# Patient Record
Sex: FEMALE | Race: WHITE | NOT HISPANIC OR LATINO | Employment: OTHER | URBAN - METROPOLITAN AREA
[De-identification: names, ages, dates, MRNs, and addresses within clinical notes are randomized per-mention and may not be internally consistent; named-entity substitution may affect disease eponyms.]

---

## 2018-03-23 ENCOUNTER — HOSPITAL ENCOUNTER (INPATIENT)
Facility: CLINIC | Age: 53
LOS: 4 days | Discharge: HOME | DRG: 897 | End: 2018-03-27
Attending: EMERGENCY MEDICINE | Admitting: PSYCHIATRY & NEUROLOGY
Payer: COMMERCIAL

## 2018-03-23 DIAGNOSIS — F12.10 CANNABIS ABUSE: ICD-10-CM

## 2018-03-23 DIAGNOSIS — F10.20 UNCOMPLICATED ALCOHOL DEPENDENCE (H): ICD-10-CM

## 2018-03-23 LAB
ALCOHOL BREATH TEST: 0.03 (ref 0–0.01)
AMPHETAMINES UR QL SCN: NEGATIVE
BARBITURATES UR QL: NEGATIVE
BENZODIAZ UR QL: NEGATIVE
CANNABINOIDS UR QL SCN: POSITIVE
COCAINE UR QL: NEGATIVE
ETHANOL UR QL SCN: NEGATIVE
OPIATES UR QL SCN: NEGATIVE

## 2018-03-23 PROCEDURE — 80320 DRUG SCREEN QUANTALCOHOLS: CPT | Performed by: EMERGENCY MEDICINE

## 2018-03-23 PROCEDURE — 25000132 ZZH RX MED GY IP 250 OP 250 PS 637: Performed by: PSYCHIATRY & NEUROLOGY

## 2018-03-23 PROCEDURE — 82075 ASSAY OF BREATH ETHANOL: CPT | Performed by: EMERGENCY MEDICINE

## 2018-03-23 PROCEDURE — 99284 EMERGENCY DEPT VISIT MOD MDM: CPT | Mod: Z6 | Performed by: EMERGENCY MEDICINE

## 2018-03-23 PROCEDURE — 99207 ZZC DOWN CODE DUE TO SUBSEQUENT EXAM: CPT | Performed by: PSYCHIATRY & NEUROLOGY

## 2018-03-23 PROCEDURE — HZ2ZZZZ DETOXIFICATION SERVICES FOR SUBSTANCE ABUSE TREATMENT: ICD-10-PCS | Performed by: PSYCHIATRY & NEUROLOGY

## 2018-03-23 PROCEDURE — 99221 1ST HOSP IP/OBS SF/LOW 40: CPT | Mod: AI | Performed by: PSYCHIATRY & NEUROLOGY

## 2018-03-23 PROCEDURE — 80307 DRUG TEST PRSMV CHEM ANLYZR: CPT | Performed by: EMERGENCY MEDICINE

## 2018-03-23 PROCEDURE — 12800012 ZZH R&B CD MH INTERMEDIATE ADULT

## 2018-03-23 PROCEDURE — 99285 EMERGENCY DEPT VISIT HI MDM: CPT | Performed by: EMERGENCY MEDICINE

## 2018-03-23 RX ORDER — MULTIPLE VITAMINS W/ MINERALS TAB 9MG-400MCG
1 TAB ORAL DAILY
Status: DISCONTINUED | OUTPATIENT
Start: 2018-03-23 | End: 2018-03-27 | Stop reason: HOSPADM

## 2018-03-23 RX ORDER — ATENOLOL 50 MG/1
50 TABLET ORAL DAILY PRN
Status: DISCONTINUED | OUTPATIENT
Start: 2018-03-23 | End: 2018-03-27 | Stop reason: HOSPADM

## 2018-03-23 RX ORDER — FOLIC ACID 1 MG/1
1 TABLET ORAL DAILY
Status: DISCONTINUED | OUTPATIENT
Start: 2018-03-23 | End: 2018-03-27 | Stop reason: HOSPADM

## 2018-03-23 RX ORDER — HYDROXYZINE HYDROCHLORIDE 25 MG/1
25 TABLET, FILM COATED ORAL EVERY 4 HOURS PRN
Status: DISCONTINUED | OUTPATIENT
Start: 2018-03-23 | End: 2018-03-27 | Stop reason: HOSPADM

## 2018-03-23 RX ORDER — LANOLIN ALCOHOL/MO/W.PET/CERES
100 CREAM (GRAM) TOPICAL DAILY
Status: COMPLETED | OUTPATIENT
Start: 2018-03-23 | End: 2018-03-25

## 2018-03-23 RX ORDER — DIAZEPAM 5 MG
5-20 TABLET ORAL EVERY 30 MIN PRN
Status: DISCONTINUED | OUTPATIENT
Start: 2018-03-23 | End: 2018-03-27 | Stop reason: HOSPADM

## 2018-03-23 RX ORDER — PAROXETINE 20 MG/1
20 TABLET, FILM COATED ORAL DAILY
Status: DISCONTINUED | OUTPATIENT
Start: 2018-03-23 | End: 2018-03-26

## 2018-03-23 RX ADMIN — FOLIC ACID 1 MG: 1 TABLET ORAL at 14:51

## 2018-03-23 RX ADMIN — NICOTINE POLACRILEX 8 MG: 4 GUM, CHEWING ORAL at 20:13

## 2018-03-23 RX ADMIN — Medication 100 MG: at 14:51

## 2018-03-23 RX ADMIN — PAROXETINE 20 MG: 20 TABLET, FILM COATED ORAL at 14:51

## 2018-03-23 RX ADMIN — NICOTINE POLACRILEX 4 MG: 4 GUM, CHEWING ORAL at 16:18

## 2018-03-23 RX ADMIN — HYDROXYZINE HYDROCHLORIDE 25 MG: 25 TABLET ORAL at 20:13

## 2018-03-23 RX ADMIN — MULTIPLE VITAMINS W/ MINERALS TAB 1 TABLET: TAB at 14:51

## 2018-03-23 RX ADMIN — DIAZEPAM 10 MG: 5 TABLET ORAL at 20:13

## 2018-03-23 RX ADMIN — DIAZEPAM 10 MG: 5 TABLET ORAL at 16:17

## 2018-03-23 ASSESSMENT — ENCOUNTER SYMPTOMS
POLYDIPSIA: 0
ADENOPATHY: 0
FEVER: 0
NECK PAIN: 0
VOMITING: 0
BACK PAIN: 0
COLOR CHANGE: 0
LIGHT-HEADEDNESS: 0
NECK STIFFNESS: 0
ABDOMINAL PAIN: 0
NAUSEA: 0
SHORTNESS OF BREATH: 0
CHILLS: 0
DIFFICULTY URINATING: 0

## 2018-03-23 ASSESSMENT — ACTIVITIES OF DAILY LIVING (ADL)
GROOMING: INDEPENDENT
GROOMING: INDEPENDENT
ORAL_HYGIENE: INDEPENDENT
ORAL_HYGIENE: INDEPENDENT
DRESS: INDEPENDENT
LAUNDRY: WITH SUPERVISION

## 2018-03-23 NOTE — H&P
Admitted:     03/23/2018      INITIAL ADMISSION       IDENTIFYING INFORMATION:  The patient is a 52-year-old  female.  She is on disability.  She is single, does not have any children.  She sees Dr. MENG      CHIEF COMPLAINT:  Alcohol.      HISTORY OF PRESENT ILLNESS:  The patient came here wanting detox from alcohol.  She has been drinking for almost a decade.  She is not able to elaborate about what prompted her to get help.  Alcohol is her drug of choice, started in high school.  By 20s, it was a problem.  Last treatment was over a decade ago.  She has complicated withdrawal, including history of DTs, and that is what prompted her to get help.  She has been drinking every day.  She is not able to tell me how much she has been drinking.  She says she is drinking up to a liter.  She has tolerance, withdrawal, history of DTs, progressive use with loss of control, tried to quit unsuccessfully, used despite negative consequences, money, relationships.  She uses marijuana daily.  She smokes 1 pack a day.  Denies using any other drugs.  She does have depression.  When she gets depressed, energy and motivation suffer.  She feels hopeless, helpless, worthless, poor energy, poor concentration, poor motivation, not able to enjoy things.  She does not have any suicidal or homicidal ideation, denies auditory or visual hallucinations.      PAST PSYCHIATRIC HISTORY:  Psychiatrically hospitalized 2 or 3 times for depression, last time was over a year ago.  She was also diagnosed with social phobia and she is an anxious person.  She was in 7 chemical dependency treatments.  No stay of commitments or court ordered.       PAST MEDICAL HISTORY:  The patient's 10-point review of systems was reviewed and is negative.  The patient's vitals are as below.        VITAL SIGNS:  Temperature of 98, heart rate of 90, respiratory rate of 16, blood pressure of 144/88.      FAMILY HISTORY:  Depression in aunts, uncles.  Great-uncle  committed suicide.  Cousin attempted suicide.      SOCIAL HISTORY:  Grew up in [place], third of 5 children.  Grew up with both parents.  She is presently unemployed.      MENTAL STATUS EXAMINATION:  The patient is a 52-year-old  female who is alert and oriented.  She is cooperative.  Mood is tired.  Affect is congruent.  Speech is spontaneous, normal rate.  Does not have any suicidal or homicidal ideation, denies any auditory or visual hallucinations.  Alert and oriented x3.  Recent and remote memory, language, fund of knowledge are all adequate.  No loose associations.      DIAGNOSES:  Axis I:     1.  Alcohol use disorder, severe.   2.  Major depressive disorder, recurrent, without psychotic features.      PLAN:  The patient will be detoxed off alcohol using MSSA protocol on Valium.  The patient will be restarted back on her medications once they are known.  The patient at this time is ambivalent what she wants to do in terms of treatment.         TAMAR SINHA MD             D: 2018   T: 2018   MT: TIM      Name:     DEVAUGHN ALLAN   MRN:      [id number]        Account:      [id number]   :      1965        Admitted:     2018                   Document: [id number]

## 2018-03-23 NOTE — PROGRESS NOTES
Pt's 52 year old female, came into ED after etOH use worsened. For past month she has been drinking about 1 liter vodka daily, last drink at 0800 today. She has a history of anxiety and depression, but stopped taking her medications about a month ago because she was afraid of how her increased drinking would affect the medication. She decided she wanted to become sober at the urging of a good friend and because she is worried about health and how drinking has taken away things she used to enjoy.  She is currently on disability for her depression.      She was unsure of how many previous inpatient programs she has attended, last one was possibly ten years ago at [facility] in [place]. She states she wants to restart her medication and start Antabuse. In the past she has found having a CD  and sponsor to be helpful. Her main support is a friend and her parents.     She has a history of passive SI, denies currently, contracts for safety.     She denies seizure history, but has a history of DTs.      Spoke with pt's OP pharmacy, pt's medications were:   Wellbutrin XL 450mg daily   Paroxetine 40mg Once daily   Abilify 15mg once a day   Propanolol  10-20mg TID prn    Seroquel 25-50 mg TID PRN

## 2018-03-23 NOTE — ED NOTES
LAST DRINK: Friday, 3/318 @ about 915 am  Daily Intake: 750 ml VODKA     No history of seizures    Patient is a smoker - 1 pack daily  And prefers a patch

## 2018-03-23 NOTE — IP AVS SNAPSHOT
MRN:[id number]                      After Visit Summary   3/23/2018    Steph Drew    MRN: [id number]           Thank you!     Thank you for choosing [facility] for your care. Our goal is always to provide you with excellent care.        Patient Information     Date Of Birth          1965        Designated Caregiver       Most Recent Value    Caregiver    Will someone help with your care after discharge? no      About your hospital stay     You were admitted on:  March 23, 2018 You last received care in the:  [facility]    You were discharged on:  March 27, 2018       Who to Call     For medical emergencies, please call 158.  For non-urgent questions about your medical care, please call your primary care provider or clinic, [phone]          Attending Provider     Provider Specialty    Steve Garcia MD Emergency Medicine    Tiff Mccauley MD Psychiatry       Primary Care Provider Office Phone # Fax #    Ariella Juarez [facility] [phone]      Follow-up Appointments     Follow Up ([facility])       [facility], within 7-14 days regarding new diagnosis and to follow up on results.  The following labs/tests are recommended: CBC, CMP, thyroid function tests.      Appointments on [facility] and/or [facility] (with [facility] or [facility] provider or service). Call [phone] if you haven't heard regarding these appointments within 7 days of discharge.                  Further instructions from your care team       Behavioral Discharge Planning and Instructions  THANK YOU FOR CHOOSING THE [address]    Summary: You were admitted to [address] [place] [address] on 3/23/18for detoxification from alcohol. You are being discharged today 3-.   You completed a chemical health assessment and you have been referred to OP treatment at [facility].      [facility] [place] [address]  Intake Department: [phone]    Recommendation: The treatment team recommends you enter an [facility]D outpatient program.      Main Diagnoses: DIAGNOSES per Dr. Mccauley psychiatrist:    Axis I:     1.  Alcohol use disorder, severe.   2.  Major depressive disorder, recurrent, without psychotic features.       Major Treatments, Procedures and Findings: Your withdrawal was treated with valium.  You were followed by Psychiatry and Medical.  You were given a copy of your lab results which were reviewed with you. Please bring your lab results with you to your primary follow up appointment. Make your recovery a priority, Steph.    Ultrasound and additional labs were ordered  You are to follow up with your primary MD for continued medical management     Symptoms to Report: If  you experience feeling more anxiety, confusion, losing more sleep, mood declining or thoughts of suicide, notify your treatment team or notify your primary physician. IF ANY OF THE SYMPTOMS YOU ARE EXPERIENCING ARE A MEDICAL EMERGENCY CALL 911 IMMEDIATELY.    Lifestyle Adjustment:   Health Action Plan:  1.Create a daily schedule  2. Eat Healthy  3. Plan Enjoyable Sober Activities  4. Use Problem Solving Skills and Deal with Issues as they Arise.   5. Be Physically Active  6. Take your medications as prescribed  7. Get enough restful sleep  8. Practice Relaxation  9. Spend time with Supportive People  10. No use of alcohol, illegal drugs or addictive medications other than what is currently prescribed.   11.AA, NA Sponsor are excellent resources for support    Keeping hands clean is one of the most important steps we can take to avoid getting sick and spreading germs to others.  Please wash your hands frequently.     Medical Provider Follow Up:   [facility], within 7-14 days regarding new diagnosis and to follow up on results.  The following labs/tests are recommended: CBC,  CMP, thyroid function tests. You report an appointment on Friday March 30th at 10:40 with Shahbaz Cresponavi .    Psychiatry Follow Up:  On Monday April 23rd at 11:00 am with Dr. Casarez at [facility].    Support Group:  AA/NA and Sponsor contact/support    Resources:  Crisis Intervention: 214.800.3455 or 013-078-0001 (TTY: 370.970.2152).  Call anytime for help.  Suicide Awareness Voices of Education (SAVE) (www.save.org): 419-190-UCYS (1245)  National Suicide Prevention Line (www.mentalhealthmn.org): 137-611-UAND (5128)  National [place] on Mental Illness (www.mn.alexander.org): 245.996.6604 or 130-336-9274.  Alcoholics Anonymous (www.alcoholics-anonymous.org): Or local information can be found 24 hours a day at:   AA Intergroup service office in [place] (http://www.aastpaul.org/) 495.791.1530  AA Intergroup service office in [facility]: 560.209.2294. (http://www.a[place].org/)  Narcotics Anonymous (www.nam[place].org)1-546.909.1111   [facility] Behavioral Intake 950-112-0963     Www.smartrecovery.org  SMART Recovery's 4-Point Program  helps people recover from all types of addictive behaviors, including: alcoholism, drug abuse, substance abuse, drug addiction, alcohol abuse, gambling addiction, cocaine addiction, and addiction to other substances and activities.  SMART Recovery (Self-Management And Recovery Training) is not a 12-step group, like Alcoholics Anonymous (AA) or Narcotics Anonymous (NA).  Teaches self-empowerment and self-reliance.  * Encourages individuals to recover and live satisfying lives.  * Teaches tools and techniques for self-directed change.  * Meetings are educational and include open discussions.  * Advocates the appropriate use of prescribed medications and psychological treatments.  * Evolves as scientific knowledge of addiction recovery evolves.  Many of the modules are available online.    [employer] is a suicide prevention resource for  "residents in [place].  We can help you with relationship issues, general mental health, and suicide.  We are free, confidential, and here for you 24/7/365.  1. Text \"Life\" to 19518  2. Wait for a trained crisis counselor to respond to your text  3. Respond and start the conversation about what you are concerned about   If you believe a person s life is in imminent danger, call 911.        [facility] ([facility])  [facility] connects people seeking recovery to resources that help foster and sustain long-term recovery.  Whether you are seeking resources for treatment, transportation, housing, job training, education, health care or other pathways to recovery, [facility] is a great place to start.  [phone]. Www.[facility].org    General Medication Instructions:   See your medication sheet(s) for instructions. Take all medicines as directed.  Make no changes unless your doctor directs them. Go to all your doctor visits. Be sure to have all your required lab tests. This way, your medicines can be refilled in timely fashion. Do not use any drugs not prescribed by your provider. AA/NA and Sponsors are excellent resources for support. Avoid alcohol.    Please Note: If you have any questions at anytime after you are discharged please call the [facility] [place] detox unit 3AW unit at [phone].  [facility], Behavioral Intake [phone]  Please take this discharge folder with you to all your follow up appointments, it contains your lab results, diagnosis, medication list and discharge recommendations.    THANK YOU FOR CHOOSING THE [facility]                               Pending Results     Date and Time Order Name Status Description    3/26/2018 1343 Hepatitis B Surface Antibody In process     3/26/2018 1343 Hepatitis B surface antigen In process     3/26/2018 1343 Hepatitis C antibody In process             Statement of Approval     Ordered " "         18 1100  I have reviewed and agree with all the recommendations and orders detailed in this document.  EFFECTIVE NOW     Approved and electronically signed by:  Tiff Mccauley MD             Admission Information     Date & Time Provider Department Dept. Phone    3/23/2018 Tiff Mccauley MD [facility] [phone]      Your Vitals Were     Blood Pressure Pulse Temperature Respirations Weight Pulse Oximetry    152/104 79 99  F (37.2  C) (Oral) 16 94 kg (207 lb 4 oz) 98%      MyChart Information     [employer] lets you send messages to your doctor, view your test results, renew your prescriptions, schedule appointments and more. To sign up, go to [email or web address] . Click on \"Log in\" on the left side of the screen, which will take you to the Welcome page. Then click on \"Sign up Now\" on the right side of the page.     You will be asked to enter the access code listed below, as well as some personal information. Please follow the directions to create your username and password.     Your access code is: [email or web address]  Expires: 2018 11:02 AM     Your access code will  in 90 days. If you need help or a new code, please call your [place] clinic or [phone].        Care EveryWhere ID     This is your Care EveryWhere ID. This could be used by other organizations to access your [place] medical records  [id number]        Equal Access to Services     PALAK ROMERO AH: Hadii raghav amayao Somichelle, waaxda luqadaha, qaybta kaalmada pardeepegyada, courtney barone . So [facility] [phone].    ATENCIÓN: Si habla español, tiene a borrero disposición servicios gratuitos de asistencia lingüística. Llame al [phone].    We comply with applicable federal civil rights laws and [place] laws. We do not discriminate on the basis of race, color, national origin, age, disability, sex, sexual orientation, or gender identity.               Review of your " medicines      START taking        Dose / Directions    multivitamin, therapeutic with minerals Tabs tablet   Used for:  Uncomplicated alcohol dependence (H)        Dose:  1 tablet   Start taking on:  3/28/2018   Take 1 tablet by mouth daily   Quantity:  30 each   Refills:  0       nicotine polacrilex 4 MG gum   Commonly known as:  NICORETTE   Used for:  Uncomplicated alcohol dependence (H)        Dose:  4-8 mg   Place 1-2 each (4-8 mg) inside cheek every hour as needed for other (nicotine withdrawal symptoms)   Quantity:  270 tablet   Refills:  0       PARoxetine 30 MG tablet   Commonly known as:  PAXIL   Used for:  Uncomplicated alcohol dependence (H)        Dose:  30 mg   Start taking on:  3/28/2018   Take 1 tablet (30 mg) by mouth daily   Quantity:  30 tablet   Refills:  0         STOP taking     QUEtiapine 25 MG tablet   Commonly known as:  SEROquel           WELLBUTRIN  MG 24 hr tablet   Generic drug:  buPROPion                Where to get your medicines      These medications were sent to [facility] [address]  [address] [address]     Phone:  [phone]     multivitamin, therapeutic with minerals Tabs tablet    nicotine polacrilex 4 MG gum    PARoxetine 30 MG tablet                Protect others around you: Learn how to safely use, store and throw away your medicines at www.disposemymeds.org.             Medication List: This is a list of all your medications and when to take them. Check marks below indicate your daily home schedule. Keep this list as a reference.      Medications           Morning Afternoon Evening Bedtime As Needed    multivitamin, therapeutic with minerals Tabs tablet   Take 1 tablet by mouth daily   Start taking on:  3/28/2018   Last time this was given:  1 tablet on 3/27/2018  8:58 AM                                   nicotine polacrilex 4 MG gum   Commonly known as:  NICORETTE   Place 1-2 each (4-8 mg) inside  cheek every hour as needed for other (nicotine withdrawal symptoms)   Last time this was given:  8 mg on 3/27/2018  9:30 AM                                   PARoxetine 30 MG tablet   Commonly known as:  PAXIL   Take 1 tablet (30 mg) by mouth daily   Start taking on:  3/28/2018   Last time this was given:  30 mg on 3/27/2018  8:58 AM                                             More Information        Depression  Depression is one of the most common mental health problems today. It is not just a state of unhappiness or sadness. It is a true disease. The cause seems to be related to a decrease in chemicals that transmit signals in the brain. Having a family history of depression, alcoholism, or suicide increases the risk. Chronic illness, chronic pain, migraine headaches and high emotional stress also increase the risk.  Depression is something we tend to recognize in others, but may have a hard time seeing in ourselves. It can show in many physical and emotional ways:    Loss of appetite    Over-eating    Not being able to sleep    Sleeping too much    Tiredness not related to physical exertion    Restlessness or irritability    Slowness of movement or speech    Feeling depressed or withdrawn    Loss of interest in things you once enjoyed    Trouble concentrating, poor memory, trouble making decisions    Thoughts of harming or killing oneself, or thoughts that life is not worth living    Low self-esteem  The treatment for depression may include both medicine and psychotherapy. Antidepressants can reduce suffering and can improve the ability to function during the depressed period. Therapy can offer emotional support and help you understand emotional factors that may be causing the depression.  Home care    On-going care and support helps people manage this disease.  Find a healthcare provider and therapist who meet your needs. Seek help when you feel like you may be getting ill.    Be kind to yourself. Make it a  point to do things that you enjoy (gardening, walking in nature, going to a movie, etc.). Reward yourself for small successes.    Take care of your physical body. Eat a balanced diet (low in saturated fat and high in fruits and vegetables). Exercise at least 3 times a week for 30 minutes. Even mild-moderate exercise (like brisk walking) can make you feel better.    Avoid alcohol, which can make depression worse.    Take medicine as prescribed.    Tell each of your healthcare providers about all of the prescription drugs, over-the-counter medicines, vitamins, and supplements you take. Certain supplements interact with medicines and can result in dangerous side effects. Ask your pharmacist when you have questions about drug interactions.    Talk with your family and trusted friends about your feelings and thoughts. Ask them to help you recognize behavior changes early so you can get help and, if needed, medicine can be adjusted.  Follow-up care  Follow up with your healthcare provider, or as advised.  Call 911  Call 911 if you:    Have suicidal thoughts, a suicide plan, and the means to carry out the plan    Have trouble breathing    Are very confused    Feel very drowsy or have trouble awakening    Faint or lose consciousness    Have new chest pain that becomes more severe, lasts longer, or spreads into your shoulder, arm, neck, jaw or back  When to seek medical advice  Call your healthcare provider right away if any of these occur:    Feeling extreme depression, fear, anxiety, or anger toward yourself or others    Feeling out of control    Feeling that you may try to harm yourself or another    Hearing voices that others do not hear    Seeing things that others do not see    Can t sleep or eat for 3 days in a row    Friends or family express concern over your behavior and ask you to seek help  Date Last Reviewed: 9/29/2015 2000-2017 [employer]. [address] [place] [address], [place] [address]. All rights  reserved. This information is not intended as a substitute for professional medical care. Always follow your healthcare professional's instructions.

## 2018-03-23 NOTE — PROGRESS NOTES
18 1319   Patient Belongings   Did you bring any home meds/supplements to the hospital?  No   Patient Belongings other (see comments)   Disposition of Belongings Other (see comment)   Belongings Search Yes   Clothing Search Yes   Second Staff khushbu holt   storage bin: jacket, belt, gloves, purse, sunglasses, blue cloth bag, pack of wipes, jacket, sweatshirt with string, pouch with toiletries including sharps, Ziploc with cigs, gum, lighter, set of keys    Locked drawer: phone, wallet    Security env # [id number]: [place], [place]bt card, 2 visa cards, $2.00 bill, 4 healthpartner's card, 2 [place] 's lic (one ), [place], [place] health [place]    A               Admission:  I am responsible for any personal items that are not sent to the safe or pharmacy.  [facility] is not responsible for loss, theft or damage of any property in my possession.    Signature:  _________________________________ Date: _______  Time: _____                                              Staff Signature:  ____________________________ Date: ________  Time: _____      2nd Staff person, if patient is unable/unwilling to sign:    Signature: ________________________________ Date: ________  Time: _____     Discharge:  [facility] has returned all of my personal belongings:    Signature: _________________________________ Date: ________  Time: _____                                          Staff Signature:  ____________________________ Date: ________  Time: _____

## 2018-03-23 NOTE — IP AVS SNAPSHOT
[facility]    [address] [facility] [address]    Phone:  [phone]                                       After Visit Summary   3/23/2018    Steph Drew    MRN: [id number]           After Visit Summary Signature Page     I have received my discharge instructions, and my questions have been answered. I have discussed any challenges I see with this plan with the nurse or doctor.    ..........................................................................................................................................  Patient/Patient Representative Signature      ..........................................................................................................................................  Patient Representative Print Name and Relationship to Patient    ..................................................               ................................................  Date                                            Time    ..........................................................................................................................................  Reviewed by Signature/Title    ...................................................              ..............................................  Date                                                            Time

## 2018-03-23 NOTE — ED PROVIDER NOTES
History     Chief Complaint   Patient presents with     Alcohol Intoxication     Detox from alcohol 1 liter vodka per day.  Last drink at 0800 today.  THC daily a lot last used today.     HPI  Steph Drew is a 52 year old female with a history of alcohol abuse and substance abuse who presents to the Emergency Department for evaluation of alcohol intoxication. Patient reports that she drinks about 1 L of vodka daily and her last drink was at 8:00AM today. She also smokes marijuana daily, but denies any other drug use. She denies suicidal or homicidal ideation.  No pain or fevers.    Past Medical History:   Diagnosis Date     Drug dependence (H)     2004, treatment at [facility]     History of other genital system and obstetric disorders     G2, P0-0-2-0     History of other infectious or parasitic disease     Childhood     Nondependent alcohol abuse, in remission     No Comments Provided     Other follow-up examination     2007,Inpatient, at [facility]       Past Surgical History:   Procedure Laterality Date     APPENDECTOMY OPEN      Age 17     TONSILLECTOMY, ADENOIDECTOMY, COMBINED      Age 8       Family History   Problem Relation Age of Onset     Family History Negative Mother      Good Health     Family History Negative Father      Good Health     Family History Negative Sister      Good Health     Family History Negative Brother      Good Health     Family History Negative Brother      Good Health     Family History Negative Brother      Good Health       Social History   Substance Use Topics     Smoking status: Current Every Day Smoker     Packs/day: 1.00     Smokeless tobacco: Never Used     Alcohol use Yes       Current Facility-Administered Medications   Medication     diazepam (VALIUM) tablet 5-20 mg     atenolol (TENORMIN) tablet 50 mg     thiamine tablet 100 mg     folic acid (FOLVITE) tablet 1 mg     multivitamin, therapeutic with minerals (THERA-VIT-M)  tablet 1 tablet     hydrOXYzine (ATARAX) tablet 25 mg     nicotine polacrilex (NICORETTE) gum 4-8 mg     PARoxetine (PAXIL) tablet 20 mg      No Known Allergies      I have reviewed the Medications, Allergies, Past Medical and Surgical History, and Social History in the Epic system.    Review of Systems   Constitutional: Negative for chills and fever.   HENT: Negative for congestion.    Eyes: Negative for visual disturbance.   Respiratory: Negative for shortness of breath.    Cardiovascular: Negative for chest pain.   Gastrointestinal: Negative for abdominal pain, nausea and vomiting.   Endocrine: Negative for polydipsia and polyuria.   Genitourinary: Negative for difficulty urinating.   Musculoskeletal: Negative for back pain, neck pain and neck stiffness.   Skin: Negative for color change.   Neurological: Negative for light-headedness.   Hematological: Negative for adenopathy.   Psychiatric/Behavioral: Negative for behavioral problems and suicidal ideas.       Physical Exam   BP: (!) 139/100  Pulse: 97  Heart Rate: 90  Temp: 98.4  F (36.9  C)  Resp: 16  Weight: 94 kg (207 lb 4 oz)  SpO2: 98 %      Physical Exam   Constitutional: She is oriented to person, place, and time. She appears well-developed and well-nourished. No distress.   HENT:   Head: Normocephalic and atraumatic.   Mouth/Throat: Oropharynx is clear and moist. No oropharyngeal exudate.   Eyes: Pupils are equal, round, and reactive to light. No scleral icterus.   Neck: Normal range of motion.   Cardiovascular: Normal rate, normal heart sounds and intact distal pulses.    Pulmonary/Chest: Effort normal and breath sounds normal. No respiratory distress. She has no wheezes.   Abdominal: Soft. Bowel sounds are normal. There is no tenderness.   Musculoskeletal: Normal range of motion. She exhibits no edema or tenderness.   Neurological: She is alert and oriented to person, place, and time. No cranial nerve deficit. She exhibits normal muscle tone.  Coordination normal.   Skin: Skin is warm. No rash noted. She is not diaphoretic.   Psychiatric: She has a normal mood and affect. Her behavior is normal. Judgment and thought content normal.   Nursing note and vitals reviewed.      ED Course     ED Course     Procedures             Critical Care time:  none             Labs Ordered and Resulted from Time of ED Arrival Up to the Time of Departure from the ED   DRUG ABUSE SCREEN 6 CHEM DEP URINE ([facility]) - Abnormal; Notable for the following:        Result Value    Cannabinoids Qual Urine Positive (*)     All other components within normal limits   ALCOHOL BREATH TEST POCT - Abnormal; Notable for the following:     Alcohol Breath Test 0.026 (*)     All other components within normal limits            Assessments & Plan (with Medical Decision Making)   Patient is a 52 year old female who drinks about 1 L of vodka per day and smokes marijuana daily. She is presenting to the ED seeking detox. Patient's breathalyzed alcohol level is 0.026. Patient has a bed on hold and is medically cleared.      I have reviewed the nursing notes.    I have reviewed the findings, diagnosis, plan and need for follow up with the patient.    Current Discharge Medication List          Final diagnoses:   Uncomplicated alcohol dependence (H)   Cannabis abuse       3/23/2018   [facility], [place], EMERGENCY DEPARTMENT     Steve Garcia MD  03/23/18 [id number]

## 2018-03-23 NOTE — ED NOTES
.  ED to Behavioral Floor Handoff    SITUATION  Steph Drew is a 52 year old female who speaks English and lives in a home alone The patient arrived in the ED by private car from home with a complaint of Alcohol Intoxication (Detox from alcohol 1 liter vodka per day.  Last drink at 0800 today.  THC daily a lot last used today.)  .The patient's current symptoms started/worsened many years ago and and during this time her alcohol intake has increased.  She is seeking detox and would like to try antabuse.  In the ED, pt was diagnosed with   Final diagnoses:   Uncomplicated alcohol dependence (H)   Cannabis abuse        Initial vitals were: BP: (!) 139/100  Pulse: 97  Temp: 98.4  F (36.9  C)  Resp: 16  Weight: 94 kg (207 lb 4 oz)  SpO2: 98 %   --------  Is the patient diabetic? No   If yes, last blood glucose? --     If yes, was this treated in the ED? --  --------  Is the patient inebriated (ETOH) Yes or Impaired on other substances? No  MSSA done? YES  Last MSSA score: 04    Were withdrawal symptoms treated? No   Does the patient have a seizure history? No. If yes, date of most recent seizure--  --------  Is the patient patient experiencing suicidal ideation? denies current or recent suicidal ideation     Homicidal ideation? denies current or recent homicidal ideation or behaviors.    Self-injurious behavior/urges? denies current or recent self injurious behavior or ideation.  ------  Was pt aggressive in the ED No  Was a code called No  Is the pt now cooperative? Yes  -------  Meds given in ED: Medications - No data to display   Family present during ED course? No  Family currently present? No    BACKGROUND  Does the patient have a cognitive impairment or developmental disability? No  Allergies: No Known Allergies.   Social demographics are   Social History     Social History     Marital status:      Spouse name: N/A     Number of children: N/A     Years of education: N/A     Social History Main Topics      Smoking status: Current Every Day Smoker     Packs/day: 1.00     Smokeless tobacco: Never Used     Alcohol use Yes     Drug use: Yes     Special: Marijuana     Sexual activity: Not Asked     Other Topics Concern     None     Social History Narrative    Currently not working.  Inpatient detoxification in fall of 2007.  Subsequent stay at [facility].  Current adult foster care situation.       Cigarettes - One pack per day, formerly three packs per day.        ASSESSMENT  Labs results   Labs Ordered and Resulted from Time of ED Arrival Up to the Time of Departure from the ED   ALCOHOL BREATH TEST POCT - Abnormal; Notable for the following:        Result Value    Alcohol Breath Test 0.026 (*)     All other components within normal limits   DRUG ABUSE SCREEN 6 CHEM DEP URINE ([facility])      Imaging Studies: No results found for this or any previous visit (from the past 24 hour(s)).   Most recent vital signs BP (!) 139/100  Pulse 97  Temp 98.4  F (36.9  C) (Oral)  Resp 16  Wt 94 kg (207 lb 4 oz)  SpO2 98%  Breastfeeding? No   Abnormal labs/tests/findings requiring intervention:---   Pain control: pt had none  Nausea control: pt had none    RECOMMENDATION  Are any infection precautions needed (MRSA, VRE, etc.)? No If yes, what infection? --  ---  Does the patient have mobility issues? independently. If yes, what device does the pt use? ---  ---  Is patient on 72 hour hold or commitment? No If on 72 hour hold, have hold and rights been given to patient? N/A  Are admitting orders written if after 10 p.m. ?N/A  Tasks needing to be completed:---     Masha Treviño   [facility]-- [id number] [phone] [place] [facility]   [phone] [facility] ED

## 2018-03-24 LAB
ALBUMIN SERPL-MCNC: 3.8 G/DL (ref 3.4–5)
ALP SERPL-CCNC: 126 U/L (ref 40–150)
ALT SERPL W P-5'-P-CCNC: 106 U/L (ref 0–50)
ANION GAP SERPL CALCULATED.3IONS-SCNC: 8 MMOL/L (ref 3–14)
AST SERPL W P-5'-P-CCNC: 128 U/L (ref 0–45)
BASOPHILS # BLD AUTO: 0 10E9/L (ref 0–0.2)
BASOPHILS NFR BLD AUTO: 0.6 %
BILIRUB SERPL-MCNC: 1.8 MG/DL (ref 0.2–1.3)
BUN SERPL-MCNC: 4 MG/DL (ref 7–30)
CALCIUM SERPL-MCNC: 8.7 MG/DL (ref 8.5–10.1)
CHLORIDE SERPL-SCNC: 102 MMOL/L (ref 94–109)
CHOLEST SERPL-MCNC: 170 MG/DL
CO2 SERPL-SCNC: 31 MMOL/L (ref 20–32)
CREAT SERPL-MCNC: 0.62 MG/DL (ref 0.52–1.04)
DIFFERENTIAL METHOD BLD: ABNORMAL
EOSINOPHIL # BLD AUTO: 0.2 10E9/L (ref 0–0.7)
EOSINOPHIL NFR BLD AUTO: 3.8 %
ERYTHROCYTE [DISTWIDTH] IN BLOOD BY AUTOMATED COUNT: 14 % (ref 10–15)
GFR SERPL CREATININE-BSD FRML MDRD: >90 ML/MIN/1.7M2
GLUCOSE SERPL-MCNC: 107 MG/DL (ref 70–99)
HCT VFR BLD AUTO: 39 % (ref 35–47)
HDLC SERPL-MCNC: 30 MG/DL
HGB BLD-MCNC: 13.3 G/DL (ref 11.7–15.7)
IMM GRANULOCYTES # BLD: 0 10E9/L (ref 0–0.4)
IMM GRANULOCYTES NFR BLD: 0.2 %
LDLC SERPL CALC-MCNC: 91 MG/DL
LYMPHOCYTES # BLD AUTO: 1.8 10E9/L (ref 0.8–5.3)
LYMPHOCYTES NFR BLD AUTO: 33.6 %
MCH RBC QN AUTO: 35.9 PG (ref 26.5–33)
MCHC RBC AUTO-ENTMCNC: 34.1 G/DL (ref 31.5–36.5)
MCV RBC AUTO: 105 FL (ref 78–100)
MONOCYTES # BLD AUTO: 0.4 10E9/L (ref 0–1.3)
MONOCYTES NFR BLD AUTO: 6.8 %
NEUTROPHILS # BLD AUTO: 2.9 10E9/L (ref 1.6–8.3)
NEUTROPHILS NFR BLD AUTO: 55 %
NONHDLC SERPL-MCNC: 140 MG/DL
NRBC # BLD AUTO: 0 10*3/UL
NRBC BLD AUTO-RTO: 0 /100
PLATELET # BLD AUTO: 184 10E9/L (ref 150–450)
POTASSIUM SERPL-SCNC: 3 MMOL/L (ref 3.4–5.3)
PROT SERPL-MCNC: 7.2 G/DL (ref 6.8–8.8)
RBC # BLD AUTO: 3.7 10E12/L (ref 3.8–5.2)
SODIUM SERPL-SCNC: 141 MMOL/L (ref 133–144)
T4 FREE SERPL-MCNC: 0.74 NG/DL (ref 0.76–1.46)
TRIGL SERPL-MCNC: 247 MG/DL
TSH SERPL DL<=0.005 MIU/L-ACNC: 4.87 MU/L (ref 0.4–4)
WBC # BLD AUTO: 5.3 10E9/L (ref 4–11)

## 2018-03-24 PROCEDURE — 80061 LIPID PANEL: CPT | Performed by: PSYCHIATRY & NEUROLOGY

## 2018-03-24 PROCEDURE — 12800012 ZZH R&B CD MH INTERMEDIATE ADULT

## 2018-03-24 PROCEDURE — 84443 ASSAY THYROID STIM HORMONE: CPT | Performed by: PSYCHIATRY & NEUROLOGY

## 2018-03-24 PROCEDURE — 99221 1ST HOSP IP/OBS SF/LOW 40: CPT | Performed by: PHYSICIAN ASSISTANT

## 2018-03-24 PROCEDURE — 25000132 ZZH RX MED GY IP 250 OP 250 PS 637: Performed by: PSYCHIATRY & NEUROLOGY

## 2018-03-24 PROCEDURE — 85025 COMPLETE CBC W/AUTO DIFF WBC: CPT | Performed by: PSYCHIATRY & NEUROLOGY

## 2018-03-24 PROCEDURE — 99207 ZZC CONSULT E&M CHANGED TO INITIAL LEVEL: CPT | Performed by: PHYSICIAN ASSISTANT

## 2018-03-24 PROCEDURE — 36415 COLL VENOUS BLD VENIPUNCTURE: CPT | Performed by: PSYCHIATRY & NEUROLOGY

## 2018-03-24 PROCEDURE — 84439 ASSAY OF FREE THYROXINE: CPT | Performed by: PSYCHIATRY & NEUROLOGY

## 2018-03-24 PROCEDURE — 80053 COMPREHEN METABOLIC PANEL: CPT | Performed by: PSYCHIATRY & NEUROLOGY

## 2018-03-24 PROCEDURE — 25000132 ZZH RX MED GY IP 250 OP 250 PS 637: Performed by: PHYSICIAN ASSISTANT

## 2018-03-24 RX ORDER — CLONIDINE HYDROCHLORIDE 0.1 MG/1
0.1 TABLET ORAL 2 TIMES DAILY PRN
Status: DISCONTINUED | OUTPATIENT
Start: 2018-03-24 | End: 2018-03-27 | Stop reason: HOSPADM

## 2018-03-24 RX ORDER — POTASSIUM CHLORIDE 1500 MG/1
60 TABLET, EXTENDED RELEASE ORAL ONCE
Status: DISCONTINUED | OUTPATIENT
Start: 2018-03-24 | End: 2018-03-24

## 2018-03-24 RX ORDER — POTASSIUM CHLORIDE 1500 MG/1
40 TABLET, EXTENDED RELEASE ORAL ONCE
Status: COMPLETED | OUTPATIENT
Start: 2018-03-24 | End: 2018-03-24

## 2018-03-24 RX ORDER — POTASSIUM CHLORIDE 1500 MG/1
20 TABLET, EXTENDED RELEASE ORAL ONCE
Status: COMPLETED | OUTPATIENT
Start: 2018-03-24 | End: 2018-03-24

## 2018-03-24 RX ADMIN — DIAZEPAM 10 MG: 5 TABLET ORAL at 00:31

## 2018-03-24 RX ADMIN — PAROXETINE 20 MG: 20 TABLET, FILM COATED ORAL at 08:28

## 2018-03-24 RX ADMIN — HYDROXYZINE HYDROCHLORIDE 25 MG: 25 TABLET ORAL at 20:22

## 2018-03-24 RX ADMIN — Medication 100 MG: at 08:28

## 2018-03-24 RX ADMIN — DIAZEPAM 10 MG: 5 TABLET ORAL at 08:28

## 2018-03-24 RX ADMIN — DIAZEPAM 10 MG: 5 TABLET ORAL at 04:02

## 2018-03-24 RX ADMIN — DIAZEPAM 10 MG: 5 TABLET ORAL at 16:27

## 2018-03-24 RX ADMIN — CLONIDINE HYDROCHLORIDE 0.1 MG: 0.1 TABLET ORAL at 12:30

## 2018-03-24 RX ADMIN — DIAZEPAM 10 MG: 5 TABLET ORAL at 12:30

## 2018-03-24 RX ADMIN — DIAZEPAM 10 MG: 5 TABLET ORAL at 20:22

## 2018-03-24 RX ADMIN — NICOTINE POLACRILEX 8 MG: 4 GUM, CHEWING ORAL at 17:52

## 2018-03-24 RX ADMIN — MULTIPLE VITAMINS W/ MINERALS TAB 1 TABLET: TAB at 08:28

## 2018-03-24 RX ADMIN — POTASSIUM CHLORIDE 40 MEQ: 20 TABLET, EXTENDED RELEASE ORAL at 12:54

## 2018-03-24 RX ADMIN — FOLIC ACID 1 MG: 1 TABLET ORAL at 08:28

## 2018-03-24 RX ADMIN — POTASSIUM CHLORIDE 20 MEQ: 20 TABLET, EXTENDED RELEASE ORAL at 16:27

## 2018-03-24 ASSESSMENT — ACTIVITIES OF DAILY LIVING (ADL)
DRESS: STREET CLOTHES
GROOMING: INDEPENDENT
GROOMING: INDEPENDENT
LAUNDRY: WITH SUPERVISION
ORAL_HYGIENE: INDEPENDENT
ORAL_HYGIENE: INDEPENDENT

## 2018-03-24 NOTE — CONSULTS
"  Internal Medicine Initial Visit    Steph Drew MRN# [id number]   Age: 52 year old YOB: 1965   Date of Admission: 3/23/2018    ADMIT DATE: 3/23/2018  DATE OF CONSULT: 3/24/2018    PCP: Ariella Bell    REQUESTING SERVICE: chem dep  REASON FOR CONSULT: alcohol abuse    CHIEF COMPLAINT: alcohol abuse    HPI: Steph Drew is a 52 year old female with a past medical history of alcohol abuse who is admitted to station 3A for detoxification from alcohol.     Per ED note, \"Steph Drew is a 52 year old female with a history of alcohol abuse and substance abuse who presents to the Emergency Department for evaluation of alcohol intoxication. Patient reports that she drinks about 1 L of vodka daily and her last drink was at 8:00AM today. She also smokes marijuana daily, but denies any other drug use. She denies suicidal or homicidal ideation.  No pain or fevers\".     The patient notes she has been using 1 L of vodka/day for many years, last use yesterday AM. Has h/o DTs, no seizures. Has been to detox 3 x. Notes a h/o high cholesterol, isn't on medications, is following with PCP. Notes anxiety, palpitations, diarrhea. She notes she has been having diarrhea, especially when she is anxious for many years. She denies any change in this. She denies fevers/chills, dyspnea, cough, abdominal pain, nausea/vomiting, dysuria, LE edema.     ROS:   10 point ROS asked and otherwise negative, with exceptions as noted above in HPI.     PMH:  Past Medical History:   Diagnosis Date     Drug dependence (H)     2004, treatment at [facility]     History of other genital system and obstetric disorders     G2, P0-0-2-0     History of other infectious or parasitic disease     Childhood     Nondependent alcohol abuse, in remission     No Comments Provided     Other follow-up examination     2007,Inpatient, [facility]       PSH:  Past Surgical History:   Procedure " Laterality Date     APPENDECTOMY OPEN      Age 17     TONSILLECTOMY, ADENOIDECTOMY, COMBINED      Age 8       MEDICATIONS    No current facility-administered medications on file prior to encounter.   Current Outpatient Prescriptions on File Prior to Encounter:  QUETIAPINE FUMARATE 25 MG PO TABS 1 TABLET 3 TIMES DAILY   WELLBUTRIN XL# 300 MG OR TB24 1 TABLET DAILY       ALLERGIES:   No Known Allergies    FAMILY HISTORY:  Family History   Problem Relation Age of Onset     Family History Negative Mother      Good Health     Family History Negative Father      Good Health     Family History Negative Sister      Good Health     Family History Negative Brother      Good Health     Family History Negative Brother      Good Health     Family History Negative Brother      Good Health       SOCIAL HISTORY:  Social History     Social History     Marital status:      Spouse name: N/A     Number of children: N/A     Years of education: N/A     Social History Main Topics     Smoking status: Current Every Day Smoker     Packs/day: 1.00     Smokeless tobacco: Never Used     Alcohol use Yes     Drug use: Yes     Special: Marijuana     Sexual activity: Not Asked     Other Topics Concern     None     Social History Narrative    Currently not working.  Inpatient detoxification in fall of 2007.  Subsequent stay at [facility].  Current adult foster care situation.       Cigarettes - One pack per day, formerly three packs per day.       PHYSICAL EXAM:  Blood pressure (!) 172/105, pulse 82, temperature 97.1  F (36.2  C), resp. rate 16, weight 94 kg (207 lb 4 oz), SpO2 98 %, not currently breastfeeding.    GENERAL: Alert and orientated x 3. Appears in no acute distress.   HEENT: Anicteric sclera. Mucous membranes moist and without lesions.   CV: RRR, S1S2, no murmurs appreciated.  RESPIRATORY: Respirations regular, even, and unlabored. Lungs CTAB with no wheezing, rales, rhonchi.   GI: Soft and non distended with  normoactive bowel sounds present in all quadrants. No tenderness, rebound, guarding.   EXTREMITIES: No peripheral edema.  NEUROLOGIC: CN II-XII grossly intact. No focal deficits.   MUSCULOSKELETAL: No joint swelling or tenderness.   SKIN: No jaundice. No rashes or lesions.     LABS:  CMP    Recent Labs  Lab 03/24/18  0757      POTASSIUM 3.0*   CHLORIDE 102   CO2 31   ANIONGAP 8   *   BUN 4*   CR 0.62   GFRESTIMATED >90   GFRESTBLACK >90   CARLA 8.7   PROTTOTAL 7.2   ALBUMIN 3.8   BILITOTAL 1.8*   ALKPHOS 126   *   *     CBC  Recent Labs  Lab 03/24/18  0757   WBC 5.3   RBC 3.70*   HGB 13.3   HCT 39.0   *   MCH 35.9*   MCHC 34.1   RDW 14.0        INRNo lab results found in last 7 days.    IMAGING: None to review from this admission    ASSESSMENT & RECOMMENDATIONS:  #Alcohol abuse, detoxification: In ED, Utox positive for ethanol, breath test 0.026. Consumes 1 L of vodka per day. Last use 03/23. h/o DTs, no h/o WD seizures. VS w/ elevated BP (184/114 this AM), normal HR, afebrile.   -Agree with MSSA w/ valium  -Agree with MVi, folate, thiamine  -Clonidine 0.1 mg PO BID PRN for BP >180/110  #Abnormal LFTs: Tbili of 1.8, ,  w/ normal alk phos, albumin. Likely due to longstanding alcohol abuse.   -Repeat 03/26   -Continue to abstain from alcohol  #Hyperlipidemia: LDL 91, TG of 247, Tchol 170. Following w/ PCP as OP, considering medications.   -Continue OP f/u with PCP  #Hypothyroidism: Mildly abnormal. TSH 4.87, free T4 of 0.74. No baseline. Unclear significance in setting of acute alcohol detoxification.   -F/U with PCP in 4 weeks for recheck & consideration of medication initiation  #Hypokalemia: K of 3. Likely due to alcohol abuse, nutritional deficiency in setting of this.   -60 meq of K now  -Repeat in AM  #Macrocytosis: w/ normal Hgb, MCV of 105.   -F/U with PCP as OP  -Encourage diet    I appreciate the opportunity to participate in the care of this patient.  Medicine will continue to follow K on 03/25, hepatic panel 03/26. Medicine will sign off, please notify on call LEE if any intercurrent medical issues arise.     Roselia Villanueva PA-C

## 2018-03-24 NOTE — PROGRESS NOTES
Mssa's 13 and 12 and given valium 10 mg x 2. Appetite not good. BP elevated. Affect blunted. States she just wants to stay in bed.

## 2018-03-24 NOTE — PROGRESS NOTES
"Writer met with pt to discuss aftercare plans. Pt reports she would like to start Antabuse. Reports she is not interested in inpatient nor outpatient treatment as \"I'm a mess without my animals, I need them and they need me.\" Pt reports she has been to treatment in the past, most recently was 10 years prior at [facility].  Pt states she has a psychiatrist at [facility] in [place], Dr. Casarez. States she is planning to get prescribed Antabuse from Dr. Casarez after d/c. Reports she does not have any follow-up appointments scheduled at this time. States she will do this on Monday 3/26 when office is open. Pt states phone number is located in her purse, writer encouraged pt to speak with psych associates who can assist with locating her belongings and getting the phone number.  CM to follow up on 3/26.  "

## 2018-03-25 LAB — POTASSIUM SERPL-SCNC: 3.6 MMOL/L (ref 3.4–5.3)

## 2018-03-25 PROCEDURE — 12800012 ZZH R&B CD MH INTERMEDIATE ADULT

## 2018-03-25 PROCEDURE — 84132 ASSAY OF SERUM POTASSIUM: CPT | Performed by: PHYSICIAN ASSISTANT

## 2018-03-25 PROCEDURE — 36415 COLL VENOUS BLD VENIPUNCTURE: CPT | Performed by: PHYSICIAN ASSISTANT

## 2018-03-25 PROCEDURE — 25000132 ZZH RX MED GY IP 250 OP 250 PS 637: Performed by: PHYSICIAN ASSISTANT

## 2018-03-25 PROCEDURE — 25000132 ZZH RX MED GY IP 250 OP 250 PS 637: Performed by: PSYCHIATRY & NEUROLOGY

## 2018-03-25 RX ADMIN — DIAZEPAM 10 MG: 5 TABLET ORAL at 20:15

## 2018-03-25 RX ADMIN — DIAZEPAM 10 MG: 5 TABLET ORAL at 16:25

## 2018-03-25 RX ADMIN — FOLIC ACID 1 MG: 1 TABLET ORAL at 08:48

## 2018-03-25 RX ADMIN — HYDROXYZINE HYDROCHLORIDE 25 MG: 25 TABLET ORAL at 20:15

## 2018-03-25 RX ADMIN — CLONIDINE HYDROCHLORIDE 0.1 MG: 0.1 TABLET ORAL at 16:25

## 2018-03-25 RX ADMIN — Medication 100 MG: at 08:48

## 2018-03-25 RX ADMIN — NICOTINE POLACRILEX 8 MG: 4 GUM, CHEWING ORAL at 08:49

## 2018-03-25 RX ADMIN — NICOTINE POLACRILEX 8 MG: 4 GUM, CHEWING ORAL at 18:05

## 2018-03-25 RX ADMIN — NICOTINE POLACRILEX 8 MG: 4 GUM, CHEWING ORAL at 12:24

## 2018-03-25 RX ADMIN — MULTIPLE VITAMINS W/ MINERALS TAB 1 TABLET: TAB at 08:48

## 2018-03-25 RX ADMIN — PAROXETINE 20 MG: 20 TABLET, FILM COATED ORAL at 08:48

## 2018-03-25 ASSESSMENT — ACTIVITIES OF DAILY LIVING (ADL)
GROOMING: INDEPENDENT
ORAL_HYGIENE: INDEPENDENT
GROOMING: INDEPENDENT
LAUNDRY: WITH SUPERVISION
ORAL_HYGIENE: INDEPENDENT
DRESS: INDEPENDENT

## 2018-03-25 NOTE — PROGRESS NOTES
Mssa 9 and 12 and given valium 10 mg x 2. Eating only small amounts but taking fluids well. In bed resting much of pm ,but did go to AA. BP borderline and affect slightly flat.

## 2018-03-25 NOTE — PLAN OF CARE
Problem: Substance Withdrawal  Goal: Substance Withdrawal  Signs and symptoms of listed problems will be absent or manageable.   Outcome: Improving  Pt denies SI/SIB/HI.  MSSA scores of 6 and 4.  Pt denies any issues.  Pt active in the milieu.  Pt has full range affect.  No other issues.  Will continue to monitor.

## 2018-03-26 LAB
ALBUMIN SERPL-MCNC: 3.5 G/DL (ref 3.4–5)
ALP SERPL-CCNC: 112 U/L (ref 40–150)
ALT SERPL W P-5'-P-CCNC: 162 U/L (ref 0–50)
AST SERPL W P-5'-P-CCNC: 216 U/L (ref 0–45)
BILIRUB DIRECT SERPL-MCNC: 0.4 MG/DL (ref 0–0.2)
BILIRUB SERPL-MCNC: 1.3 MG/DL (ref 0.2–1.3)
PROT SERPL-MCNC: 6.8 G/DL (ref 6.8–8.8)

## 2018-03-26 PROCEDURE — 87340 HEPATITIS B SURFACE AG IA: CPT | Performed by: PHYSICIAN ASSISTANT

## 2018-03-26 PROCEDURE — 12800012 ZZH R&B CD MH INTERMEDIATE ADULT

## 2018-03-26 PROCEDURE — 99232 SBSQ HOSP IP/OBS MODERATE 35: CPT | Performed by: PSYCHIATRY & NEUROLOGY

## 2018-03-26 PROCEDURE — 86706 HEP B SURFACE ANTIBODY: CPT | Performed by: PHYSICIAN ASSISTANT

## 2018-03-26 PROCEDURE — 80076 HEPATIC FUNCTION PANEL: CPT | Performed by: PHYSICIAN ASSISTANT

## 2018-03-26 PROCEDURE — 36415 COLL VENOUS BLD VENIPUNCTURE: CPT | Performed by: PHYSICIAN ASSISTANT

## 2018-03-26 PROCEDURE — 99231 SBSQ HOSP IP/OBS SF/LOW 25: CPT | Performed by: PHYSICIAN ASSISTANT

## 2018-03-26 PROCEDURE — 25000132 ZZH RX MED GY IP 250 OP 250 PS 637: Performed by: PSYCHIATRY & NEUROLOGY

## 2018-03-26 PROCEDURE — 86803 HEPATITIS C AB TEST: CPT | Performed by: PHYSICIAN ASSISTANT

## 2018-03-26 PROCEDURE — H0001 ALCOHOL AND/OR DRUG ASSESS: HCPCS

## 2018-03-26 RX ORDER — PAROXETINE 30 MG/1
30 TABLET, FILM COATED ORAL DAILY
Status: DISCONTINUED | OUTPATIENT
Start: 2018-03-26 | End: 2018-03-27 | Stop reason: HOSPADM

## 2018-03-26 RX ADMIN — MULTIPLE VITAMINS W/ MINERALS TAB 1 TABLET: TAB at 08:14

## 2018-03-26 RX ADMIN — NICOTINE POLACRILEX 8 MG: 4 GUM, CHEWING ORAL at 20:17

## 2018-03-26 RX ADMIN — PAROXETINE HYDROCHLORIDE 30 MG: 30 TABLET, FILM COATED ORAL at 08:14

## 2018-03-26 RX ADMIN — FOLIC ACID 1 MG: 1 TABLET ORAL at 08:14

## 2018-03-26 RX ADMIN — NICOTINE POLACRILEX 8 MG: 4 GUM, CHEWING ORAL at 08:14

## 2018-03-26 RX ADMIN — NICOTINE POLACRILEX 8 MG: 4 GUM, CHEWING ORAL at 14:53

## 2018-03-26 ASSESSMENT — ACTIVITIES OF DAILY LIVING (ADL)
ORAL_HYGIENE: INDEPENDENT
DRESS: INDEPENDENT
GROOMING: INDEPENDENT

## 2018-03-26 NOTE — PROGRESS NOTES
Mssa's 10 and 9 and BP elevated at 1600 and was given Catapres0.1 mg but also high after AA meeting. Patient states she is socially ill at ease in meetings. Says she had a nice visit with friend who is caring for her dog and cat. Eating improved and looks some better this pm. Recommended patient not to eat salty food or add to food as her body still looks somewhat puffy.

## 2018-03-26 NOTE — PROGRESS NOTES
"[facility], [place]   Psychiatric Progress Note    Interim history   This is a 52 year old female with 1.  Alcohol use disorder, severe.   2.  Major depressive disorder, recurrent, without psychotic features. .Pt seen in rounds. Patient's mood is low  Low self esteem issues -'dont fit in,everything is wrong with me\"  Energy Level:MODERATE  Sleep:No concerns, sleeps well through night  Appetite:poor motivation interest low   deneid any Suicidal/homicidal ideation/plan intent.  deneid any psychosis  No prior suicde attempts  No access to gun  Pt is in detox  Pt mssa/cows score are monitered  Tolerating meds and has no side effects.              Medications:     Current Facility-Administered Medications   Medication     cloNIDine (CATAPRES) tablet 0.1 mg     diazepam (VALIUM) tablet 5-20 mg     atenolol (TENORMIN) tablet 50 mg     folic acid (FOLVITE) tablet 1 mg     multivitamin, therapeutic with minerals (THERA-VIT-M) tablet 1 tablet     hydrOXYzine (ATARAX) tablet 25 mg     nicotine polacrilex (NICORETTE) gum 4-8 mg     PARoxetine (PAXIL) tablet 20 mg             Allergies:   No Known Allergies         Psychiatric Examination:   Blood pressure (!) 161/104, pulse 69, temperature 98.3  F (36.8  C), temperature source Oral, resp. rate 16, weight 94 kg (207 lb 4 oz), SpO2 98 %, not currently breastfeeding.  Weight is 207 lbs 4 oz  There is no height or weight on file to calculate BMI.    Appearance:  awake, alert and adequately groomed  Attitude:  cooperative  Eye Contact:  good  Mood:  sad   Affect:  appropriate and in normal range and mood congruent  Speech:  clear, coherent rate /rhythm are good  Psychomotor Behavior:  no evidence of tardive dyskinesia, dystonia, or tics and intact station, gait and muscle tone  Throught Process:  logical  Associations:  no loose associations  Thought Content:  no evidence of suicidal ideation or homicidal ideation, no evidence of psychotic thought, no " "auditory hallucinations present and no visual hallucinations present  Insight:  good  Judgement:  intact  Oriented to:  time, person, and place  Attention Span and Concentration:  intact  Recent and Remote Memory:  intact  Language fund of knowledge are adequate         Labs:     No results found for: NTBNPI, NTBNP  Lab Results   Component Value Date    WBC 5.3 03/24/2018    HGB 13.3 03/24/2018    HCT 39.0 03/24/2018     (H) 03/24/2018     03/24/2018     Lab Results   Component Value Date    TSH 4.87 (H) 03/24/2018       dx   1.  Alcohol use disorder, severe.   2.  Major depressive disorder, recurrent, without psychotic features.   Plan   will increase paxil to 30 mg po qd  Will continue to detox off alocholonmssa on valium     Laboratory/Imaging: reviewed with patient   Consults: internal medicine consult reviewed  Patient will be treated in therapeutic milieu with appropriate individual and group therapies as described.      Medical diagnoses to be addressed this admission:    Plan:  \"ASSESSMENT & RECOMMENDATIONS:  #Alcohol abuse, detoxification: In ED, Utox positive for ethanol, breath test 0.026. Consumes 1 L of vodka per day. Last use 03/23. h/o DTs, no h/o WD seizures. VS w/ elevated BP (184/114 this AM), normal HR, afebrile.   -Agree with MSSA w/ valium  -Agree with MVi, folate, thiamine  -Clonidine 0.1 mg PO BID PRN for BP >180/110  #Abnormal LFTs: Tbili of 1.8, ,  w/ normal alk phos, albumin. Likely due to longstanding alcohol abuse.   -Repeat 03/26   -Continue to abstain from alcohol  #Hyperlipidemia: LDL 91, TG of 247, Tchol 170. Following w/ PCP as OP, considering medications.   -Continue OP f/u with PCP  #Hypothyroidism: Mildly abnormal. TSH 4.87, free T4 of 0.74. No baseline. Unclear significance in setting of acute alcohol detoxification.   -F/U with PCP in 4 weeks for recheck & consideration of medication initiation  #Hypokalemia: K of 3. Likely due to alcohol abuse, " "nutritional deficiency in setting of this.   -60 meq of K now  -Repeat in AM  #Macrocytosis: w/ normal Hgb, MCV of 105.   -F/U with PCP as OP  -Encourage diet\"      Legal Status: voluntary    Safety Assessment:   Checks:  15 min  Precautions: withdrawal precautions  Pt has not required locked seclusion or restraints in the past 24 hours to maintain safety, please refer to RN documentation for further details.  Discussed with patient many issues of addiction,triggers, relapse, and establishing a solid recovery program.  Able to give informed consent:  YES   Discussed Risks/Benefits/Side Effects/Alternatives: YES    After discussion of the indications, risks, benefits, alternatives and consequences of no treatment, the patient elects to complete detox and be on antabuse   "

## 2018-03-26 NOTE — DISCHARGE INSTRUCTIONS
Behavioral Discharge Planning and Instructions  THANK YOU FOR CHOOSING THE [address]    Summary: You were admitted to [address] [place] [address] on 3/23/18for detoxification from alcohol. You are being discharged today 3-.   You completed a chemical health assessment and you have been referred to OP treatment at [facility].      [address] [address]  Intake Department: [phone]    Recommendation: The treatment team recommends you enter an [facility] outpatient program.      Main Diagnoses: DIAGNOSES per Dr. Mccauley psychiatrist:    Axis I:     1.  Alcohol use disorder, severe.   2.  Major depressive disorder, recurrent, without psychotic features.       Major Treatments, Procedures and Findings: Your withdrawal was treated with valium.  You were followed by Psychiatry and Medical.  You were given a copy of your lab results which were reviewed with you. Please bring your lab results with you to your primary follow up appointment. Make your recovery a priority, Steph.    Ultrasound and additional labs were ordered  You are to follow up with your primary MD for continued medical management     Symptoms to Report: If  you experience feeling more anxiety, confusion, losing more sleep, mood declining or thoughts of suicide, notify your treatment team or notify your primary physician. IF ANY OF THE SYMPTOMS YOU ARE EXPERIENCING ARE A MEDICAL EMERGENCY CALL 911 IMMEDIATELY.    Lifestyle Adjustment:   Health Action Plan:  1.Create a daily schedule  2. Eat Healthy  3. Plan Enjoyable Sober Activities  4. Use Problem Solving Skills and Deal with Issues as they Arise.   5. Be Physically Active  6. Take your medications as prescribed  7. Get enough restful sleep  8. Practice Relaxation  9. Spend time with Supportive People  10. No use of alcohol, illegal drugs or addictive medications other than what is currently prescribed.   11.AA, NA  Sponsor are excellent resources for support    Keeping hands clean is one of the most important steps we can take to avoid getting sick and spreading germs to others.  Please wash your hands frequently.     Medical Provider Follow Up:   [facility], within 7-14 days regarding new diagnosis and to follow up on results.  The following labs/tests are recommended: CBC, CMP, thyroid function tests. You report an appointment on Friday March 30th at 10:40 with Shahbaz Palomares .    Psychiatry Follow Up:  On Monday April 23rd at 11:00 am with Dr. Casarez at [facility].    Support Group:  AA/NA and Sponsor contact/support    Resources:  Crisis Intervention: 658.898.3225 or 522-032-4509 (TTY: 437.598.5391).  Call anytime for help.  Suicide Awareness Voices of Education (SAVE) (www.save.org): 699-793-LFRN (0583)  National Suicide Prevention Line (www.mentalhealthmn.org): 205-678-ICNQ (5074)  National [place] on Mental Illness (www.[place].org): 713.360.8815 or 618-650-7311.  Alcoholics Anonymous (www.alcoholics-anonymous.org): Or local information can be found 24 hours a day at:   AA Intergroup service office in [place] (http://www.aastpaul.org/) 298.738.4037  AA Intergroup service office in [facility]: [phone]. (http://www.a[place].org/)  Narcotics Anonymous (www.nam[place].org)1-276.215.1255   [facility] Behavioral Intake [phone]     Www.smartrecovery.org  [facility]'s 4-Point Program  helps people recover from all types of addictive behaviors, including: alcoholism, drug abuse, substance abuse, drug addiction, alcohol abuse, gambling addiction, cocaine addiction, and addiction to other substances and activities.  [facility] (Self-Management And Recovery Training) is not a 12-step group, like Alcoholics Anonymous (AA) or Narcotics Anonymous (NA).  Teaches self-empowerment and self-reliance.  * Encourages individuals to recover and live  "satisfying lives.  * Teaches tools and techniques for self-directed change.  * Meetings are educational and include open discussions.  * Advocates the appropriate use of prescribed medications and psychological treatments.  * Evolves as scientific knowledge of addiction recovery evolves.  Many of the modules are available online.    [employer] is a suicide prevention resource for residents in [place].  We can help you with relationship issues, general mental health, and suicide.  We are free, confidential, and here for you 24/7/365.  1. Text \"Life\" to 17759  2. Wait for a trained crisis counselor to respond to your text  3. Respond and start the conversation about what you are concerned about   If you believe a person s life is in imminent danger, call 911.        [place] [facility] ([facility])  [facility] connects people seeking recovery to resources that help foster and sustain long-term recovery.  Whether you are seeking resources for treatment, transportation, housing, job training, education, health care or other pathways to recovery, [facility] is a great place to start.  [phone]. Www.[facility].org    General Medication Instructions:   See your medication sheet(s) for instructions. Take all medicines as directed.  Make no changes unless your doctor directs them. Go to all your doctor visits. Be sure to have all your required lab tests. This way, your medicines can be refilled in timely fashion. Do not use any drugs not prescribed by your provider. AA/NA and Sponsors are excellent resources for support. Avoid alcohol.    Please Note: If you have any questions at anytime after you are discharged please call the [facility] [place] detox unit 3AW unit at [phone].  [facility] , Health, Behavioral Intake [phone]  Please take this discharge folder with you to all your follow up appointments, it contains your lab results, diagnosis, medication list and discharge " recommendations.    THANK YOU FOR CHOOSING THE [facility]

## 2018-03-26 NOTE — PROGRESS NOTES
Met with Pt.  Completed assessment.  Pt signed SAUNDRA and referral was made to [facility].  Will make follow up call tomorrow to schedule intake.

## 2018-03-26 NOTE — PROGRESS NOTES
Met with Pt to make recommendation for [facility] OP program.  Pt identified driving on busy streets in traffic as a barrier to attending treatment.  Located a program within 3 miles of Pt's home and accessible by side streets at [facility].  Pt agrees to assessment and referral to this program.  Coached Pt to complete paperwork for referral.  Pt acknowledged.

## 2018-03-26 NOTE — PROGRESS NOTES
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   Hallucinogen Use Disorder NA   Inhalant Use Disorder NA   Opioid Use Disorder NA   Sedative, Hypnotic, or Anxiolytic Use Disorder NA   Stimulant Related Disorder NA   Tobacco Use Disorder Moderate   (F17.200) (305.1)   Other (or unknown) Substance Use Disorder NA       Collateral Contact Summary   Number of contacts made: 1    Contact with referring person:  NA.    If court related records were reviewed, summarize here: NA    Information from collateral contacts supported/largely agreed with information from the client and associated risk ratings.      Rule 25 Assessment Summary and Plan   's Recommendation    Attend outpatient treatment  Follow the recommendations of your program  Support group participation with a same gender sponsor  Keep appointments with providers  Take medications as prescribed  Individual therapy      Collateral Contacts     Name:    [facility]   Relationship:       Phone Number:     Releases:              Tiff Mccauley MD Physician  Psychiatry H&P   Date of Service: 3/23/2018  2:05 PM Creation Time: 3/23/2018  3:38 PM           Admitted:     03/23/2018       INITIAL ADMISSION        IDENTIFYING INFORMATION:  The patient is a 52-year-old  female.  She is on disability.  She is single, does not have any children.   She sees Dr. MENG       CHIEF COMPLAINT:  Alcohol.       HISTORY OF PRESENT ILLNESS:  The patient came here wanting detox from alcohol.  She has been drinking for almost a decade.  She is not able to elaborate about what prompted her to get help.  Alcohol is her drug of choice, started in high school.  By 20s, it was a problem.  Last treatment was over a decade ago.  She has complicated withdrawal, including history of DTs, and that is what prompted her to get help.  She has been drinking every day.  She is not able to tell me how much she has been drinking.  She says she is drinking up to a liter.  She has tolerance, withdrawal, history of DTs, progressive use with loss of control, tried to quit unsuccessfully, used despite negative consequences, money, relationships.  She uses marijuana daily.  She smokes 1 pack a day.  Denies using any other drugs.  She does have depression.  When she gets depressed, energy and motivation suffer.  She feels hopeless, helpless, worthless, poor energy, poor concentration, poor motivation, not able to enjoy things.  She does not have any suicidal or homicidal ideation, denies auditory or visual hallucinations.       PAST PSYCHIATRIC HISTORY:  Psychiatrically hospitalized 2 or 3 times for depression, last time was over a year ago.  She was also diagnosed with social phobia and she is an anxious person.  She was in 7 chemical dependency treatments.  No stay of commitments or court ordered.        PAST MEDICAL HISTORY:  The patient's 10-point review of systems was reviewed and is negative.  The patient's vitals are as below.         VITAL SIGNS:  Temperature of 98, heart rate of 90, respiratory rate of 16, blood pressure of 144/88.       FAMILY HISTORY:  Depression in aunts, uncles.  Great-uncle committed suicide.  Cousin attempted suicide.       SOCIAL HISTORY:  Grew up in [place], third of 5 children.  Grew up with both parents.  She is presently unemployed.       MENTAL  STATUS EXAMINATION:  The patient is a 52-year-old  female who is alert and oriented.  She is cooperative.  Mood is tired.  Affect is congruent.  Speech is spontaneous, normal rate.  Does not have any suicidal or homicidal ideation, denies any auditory or visual hallucinations.  Alert and oriented x3.  Recent and remote memory, language, fund of knowledge are all adequate.  No loose associations.       DIAGNOSES:  Axis I:     1.  Alcohol use disorder, severe.   2.  Major depressive disorder, recurrent, without psychotic features.       PLAN:  The patient will be detoxed off alcohol using MSSA protocol on Valium.  The patient will be restarted back on her medications once they are known.  The patient at this time is ambivalent what she wants to do in terms of treatment.           TAMAR SINHA MD                                                                               Collateral Contacts     Name:       Relationship:       Phone Number:       Releases:             neema Contacts      A problematic pattern of alcohol/drug use leading to clinically significant impairment or distress, as manifested by at least two of the following, occurring within a 12-month period:    Alcohol/drug is often taken in larger amounts or over a longer period than was intended.  There is a persistent desire or unsuccessful efforts to cut down or control alcohol/drug use  A great deal of time is spent in activities necessary to obtain alcohol, use alcohol, or recover from its effects.  Craving, or a strong desire or urge to use alcohol/drug  Recurrent alcohol/drug use resulting in a failure to fulfill major role obligations at work, school or home.  Continued alcohol use despite having persistent or recurrent social or interpersonal problems caused or exacerbated by the effects of alcohol/drug.  Important social, occupational, or recreational activities are given up or reduced because of alcohol/drug use.  Recurrent  alcohol/drug use in situations in which it is physically hazardous.  Alcohol/drug use is continued despite knowledge of having a persistent or recurrent physical or psychological problem that is likely to have been caused or exacerbated by alcohol.  Tolerance, as defined by either of the following: A need for markedly increased amounts of alcohol/drug to achieve intoxication or desired effect. and A markedly diminished effect with continued use of the same amount of alcohol/drug.  Withdrawal, as manifested by either of the following: The characteristic withdrawal syndrome for alcohol/drug (refer to Criteria A and B of the criteria set for alcohol/drug withdrawal). and Alcohol/drug (or a closely related substance, such as a benzodiazepine) is taken to relieve or avoid withdrawal symptoms.      Specify if: In early remission:  After full criteria for alcohol/drug use disorder were previously met, none of the criteria for alcohol/drug use disorder have been met for at least 3 months but for less than 12 months (with the exception that Criterion A4,  Craving or a strong desire or urge to use alcohol/drug  may be met).     In sustained remission:   After full criteria for alcohol use disorder were previously met, non of the criteria for alcohol/drug use disorder have been met at any time during a period of 12 months or longer (with the exception that Criterion A4,  Craving or strong desire or urge to use alcohol/drug  may be met).   Specify if:   This additional specifier is used if the individual is in an environment where access to alcohol is restricted.    Mild: Presence of 2-3 symptoms    Moderate: Presence of 4-5 symptoms    Severe: Presence of 6 or more symptoms

## 2018-03-26 NOTE — PROGRESS NOTES
Brief Medicine Note    Medicine following for abnormal LFTs. ALT/AST worsening since admission. Steph reports she is feeling well and is no longer experiencing withdrawal symptoms. Denies any nausea, vomiting, abdominal pain, or jaundice.    BP (!) 141/103  Pulse 67  Temp 99  F (37.2  C)  Resp 16  Wt 94 kg (207 lb 4 oz)  SpO2 98%  Breastfeeding? No  General - Awake. Non-toxic appearing. NAD.  HEENT - NC/AT. Anicteric sclera. Mucous membranes moist.  GI - Soft and non-tender with bowel sounds present.  Skin - No rashes or jaundice on exposed areas of skin.    Assessment and Plan  Transaminitis - ALT/AST increasing since admission,  (106) and  (128). TBili and Alk Phos wnl on 3/26. Asymptomatic. Suspect due to alcohol abuse with possible component of fatty liver disease.  - Abdominal US with Dopplers on 3/27. NPO after MN.  - Add on Hepatitis serologies to today's labs  - Trend Hepatic Panel and INR on 3/27.    Medicine will follow up on lab and US results.    Christy Rowland PA-C  Hospitalist Service  [phone]

## 2018-03-26 NOTE — PLAN OF CARE
Behavioral Team Discussion: (3/26/2018)    Continued Stay Criteria/Rationale: Patient admitted for alcohol Use Disorder.  Plan: The following services will be provided to the patient; psychiatric assessment, medication management, therapeutic milieu, individual and group support, and skills groups.   Participants: 3A Provider: Dr. Tiff Mccauley MD; 3A RN's: Lanre Kaminski RN; 3A CM's: Sarah Partida .  Summary/Recommendation: Providers will assess today for treatment recommendations, discharge planning, and aftercare plans. CM will meet with pt for discharge planning. Pt requesting antabuse.  Declines case management.  Medical/Physical: Deferred (see medical notes).  Precautions:   Behavioral Orders   Procedures     Code 1 - Restrict to Unit     Fall precautions     Routine Programming     As clinically indicated     Status 15     Every 15 minutes.     Withdrawal precautions     Rationale for change in precautions or plan: N/A  Progress: Improving.

## 2018-03-27 ENCOUNTER — APPOINTMENT (OUTPATIENT)
Dept: ULTRASOUND IMAGING | Facility: CLINIC | Age: 53
DRG: 897 | End: 2018-03-27
Attending: PHYSICIAN ASSISTANT
Payer: COMMERCIAL

## 2018-03-27 VITALS
HEART RATE: 79 BPM | DIASTOLIC BLOOD PRESSURE: 104 MMHG | RESPIRATION RATE: 16 BRPM | TEMPERATURE: 99 F | OXYGEN SATURATION: 98 % | SYSTOLIC BLOOD PRESSURE: 152 MMHG | WEIGHT: 207.25 LBS

## 2018-03-27 LAB
ALBUMIN SERPL-MCNC: 4 G/DL (ref 3.4–5)
ALP SERPL-CCNC: 121 U/L (ref 40–150)
ALT SERPL W P-5'-P-CCNC: 203 U/L (ref 0–50)
AST SERPL W P-5'-P-CCNC: 238 U/L (ref 0–45)
BILIRUB DIRECT SERPL-MCNC: 0.5 MG/DL (ref 0–0.2)
BILIRUB SERPL-MCNC: 1.4 MG/DL (ref 0.2–1.3)
HBV SURFACE AB SERPL IA-ACNC: 283.63 M[IU]/ML
HBV SURFACE AG SERPL QL IA: NONREACTIVE
HCV AB SERPL QL IA: NONREACTIVE
INR PPP: 1.05 (ref 0.86–1.14)
PROT SERPL-MCNC: 7.9 G/DL (ref 6.8–8.8)

## 2018-03-27 PROCEDURE — 80076 HEPATIC FUNCTION PANEL: CPT | Performed by: PHYSICIAN ASSISTANT

## 2018-03-27 PROCEDURE — 25000132 ZZH RX MED GY IP 250 OP 250 PS 637: Performed by: PSYCHIATRY & NEUROLOGY

## 2018-03-27 PROCEDURE — 93975 VASCULAR STUDY: CPT | Mod: TC

## 2018-03-27 PROCEDURE — 85610 PROTHROMBIN TIME: CPT | Performed by: PHYSICIAN ASSISTANT

## 2018-03-27 PROCEDURE — 99239 HOSP IP/OBS DSCHRG MGMT >30: CPT | Performed by: PSYCHIATRY & NEUROLOGY

## 2018-03-27 PROCEDURE — 36415 COLL VENOUS BLD VENIPUNCTURE: CPT | Performed by: PHYSICIAN ASSISTANT

## 2018-03-27 RX ORDER — PAROXETINE 30 MG/1
30 TABLET, FILM COATED ORAL DAILY
Qty: 30 TABLET | Refills: 0 | Status: ON HOLD | OUTPATIENT
Start: 2018-03-28 | End: 2023-02-03

## 2018-03-27 RX ORDER — MULTIPLE VITAMINS W/ MINERALS TAB 9MG-400MCG
1 TAB ORAL DAILY
Qty: 30 EACH | Refills: 0 | Status: ON HOLD | OUTPATIENT
Start: 2018-03-28 | End: 2023-06-13

## 2018-03-27 RX ADMIN — FOLIC ACID 1 MG: 1 TABLET ORAL at 08:58

## 2018-03-27 RX ADMIN — MULTIPLE VITAMINS W/ MINERALS TAB 1 TABLET: TAB at 08:58

## 2018-03-27 RX ADMIN — PAROXETINE HYDROCHLORIDE 30 MG: 30 TABLET, FILM COATED ORAL at 08:58

## 2018-03-27 RX ADMIN — NICOTINE POLACRILEX 8 MG: 4 GUM, CHEWING ORAL at 09:30

## 2018-03-27 ASSESSMENT — ACTIVITIES OF DAILY LIVING (ADL)
ORAL_HYGIENE: INDEPENDENT
DRESS: INDEPENDENT
GROOMING: INDEPENDENT
LAUNDRY: WITH SUPERVISION

## 2018-03-27 NOTE — PROGRESS NOTES
Brief Medicine Note    Medicine following for abnormal LFTs. Patient planning for discharge home today.    Transaminitis - Likely secondary to alcohol abuse and JONAS. Gilbert's syndrome may be contributing to indirect hyperbilirubinemia. Most recent TBili 1.4 (1.3), Alk Phos 121 (112),  (162),  (216). INR wnl. Asymptomatic. Abdominal US (3/27) with hepatic steatosis, normal doppler evaluation, nonvisualization of CBD without intrahepatic biliary dilatation, and possible nephrolithiasis without hydronephrosis. Hepatitis B screening c/w immunity. Hepatitis C negative.   - Discussed with Hepatology team who did not feel further work-up or continued hospitalization was indicated.  - Repeat LFTs per PCP within 1-2 weeks of discharge    Medicine will sign off.    Christy Rowland PA-C  Hospitalist Service  [phone]

## 2018-03-27 NOTE — DISCHARGE SUMMARY
"Admit Date:     03/23/2018   Discharge Date:      3/27/18     More than 35 minutes spent on this summary, doing the discharge instructions, discharge medications and discharge mental status examination.      DISCHARGE DIAGNOSES:   Axis I:     1.  Alcohol use, severe.   2.  Major depressive disorder, recurrent, without psychotic features.      IDENTIFYING INFORMATION:  The patient is a 52-year-old  female admitted for detoxification.  Please review admission note by Dr. Mccauley on 03/23/2018.      HOSPITAL COURSE:  During the hospitalization, the patient was detoxed off alcohol using MSSA protocol on Valium.  She had a prolonged but uneventful detoxification.  During the hospitalization, the patient was seen by Internal Medicine consult.  Please see detailed note by Roselia Merritt PA-C on 03/24/2018.  During the hospitalization, the patient was seen by Jaylin Rowland on 03/26/2018 because ALT and AST elevation.  The liver enzymes are still elevated from 106-203.  They went from 128-238.  The patient had an ultrasound.  The ultrasound shows hepatic steatosis.  They cleared her and asked her to get primary care appointment. AS PER THEM\"Medicine following for abnormal LFTs. Patient planning for discharge home today.     Transaminitis - Likely secondary to alcohol abuse and JONAS. Gilbert's syndrome may be contributing to indirect hyperbilirubinemia. Most recent TBili 1.4 (1.3), Alk Phos 121 (112),  (162),  (216). INR wnl. Asymptomatic. Abdominal US (3/27) with hepatic steatosis, normal doppler evaluation, nonvisualization of CBD without intrahepatic biliary dilatation, and possible nephrolithiasis without hydronephrosis. Hepatitis B screening c/w immunity. Hepatitis C negative.   - Discussed with Hepatology team who did not feel further work-up or continued hospitalization was indicated.  - Repeat LFTs per PCP within 1-2 weeks \"     DISCHARGE MENTAL STATUS EXAMINATION:  The patient is " alert, oriented x 3.  Recent and remote memory, language, fund of knowledge are all adequate.  No loose associations.  The patient does not have any suicidal or homicidal ideation, plan or intent.  Her plan is to do treatment outpatient.               DISCHARGE MENTAL STATUS EXAMINATION:  The patient is alert, oriented x3.  Good fund of knowledge.  Good use of language.  Recent and remote memory, language, fund of knowledge are all adequate.  Euthymic mood congruent affect  Speech normal rate/rhythm linear tp no loose asso,The patient does not have any active suicidal or homicidal ideation.  Does not have any auditory or visual hallucination.  Fair insight/judgment At this time, the patient was stable to be discharged.         Educated about side effects/risk vs benefits /alternative including non treatment.Pt consented to be on medication.     .Total time spent on discharge summary more than 35 min  More than  20 min  planning, coordination of care, medication reconciliation and performance of physical exam on day of discharge.Care was coordinated with unit RN and unit therapist       Steph Drew   Home Medication Instructions NAVNEET:[id number]    Printed on:03/29/18 1052   Medication Information                      multivitamin, therapeutic with minerals (THERA-VIT-M) TABS tablet  Take 1 tablet by mouth daily             nicotine polacrilex (NICORETTE) 4 MG gum  Place 1-2 each (4-8 mg) inside cheek every hour as needed for other (nicotine withdrawal symptoms)             PARoxetine (PAXIL) 30 MG tablet  Take 1 tablet (30 mg) by mouth daily                Medical Provider Follow Up:   [facility], within 7-14 days regarding new diagnosis and to follow up on results.  The following labs/tests are recommended: CBC, CMP, thyroid function tests. You report an appointment on Friday March 30th at 10:40 with Shahbaz Palomares .     Psychiatry Follow Up:  On Monday April 23rd at 11:00 am with  Dr. Casarez at [facility].     TAMAR SINHA MD             D: 2018   T: 2018   MT: MERY      Name:     DEVAUGHN ALLAN   MRN:      [id number]        Account:        [id number]   :      1965           Admit Date:     2018                                  Discharge Date:       Document: [id number]

## 2018-03-27 NOTE — PLAN OF CARE
"Problem: Patient Care Overview  Goal: Discharge Needs Assessment  Outcome: Adequate for Discharge Date Met: 03/27/18  Pt being discharged to home. Pt plans to attend a outpatient CD program [facility]. Pt reports her mood as \"I feel good today.\" Denies SI. Stated \"I'm not feeling depressed.\" Rates her anxiety level a 3 on a 0-10 severe scale. Pt feels she has been eating too much. Alcohol withdrawal is complete. Denies SE of medications. See discharge instructions. Pt has gone off unit for ticket to ride for abdominal ultrasound. Pt is medically cleared for discharge.       "

## 2018-03-27 NOTE — PLAN OF CARE
"Problem: Substance Withdrawal  Goal: Substance Withdrawal  Signs and symptoms of listed problems will be absent or manageable.   Outcome: Adequate for Discharge Date Met: 03/26/18      Pt meets all criteria to be removed from MSSA monitoring. Per unit protocol, Pt now \"out of detox\".        "

## 2023-02-01 ENCOUNTER — TELEPHONE (OUTPATIENT)
Dept: BEHAVIORAL HEALTH | Facility: CLINIC | Age: 58
End: 2023-02-01

## 2023-02-01 ENCOUNTER — HOSPITAL ENCOUNTER (INPATIENT)
Facility: CLINIC | Age: 58
LOS: 2 days | Discharge: HOME | DRG: 897 | End: 2023-02-03
Attending: EMERGENCY MEDICINE | Admitting: PSYCHIATRY & NEUROLOGY
Payer: COMMERCIAL

## 2023-02-01 DIAGNOSIS — F33.2 SEVERE EPISODE OF RECURRENT MAJOR DEPRESSIVE DISORDER, WITHOUT PSYCHOTIC FEATURES (H): ICD-10-CM

## 2023-02-01 DIAGNOSIS — F10.20 UNCOMPLICATED ALCOHOL DEPENDENCE (H): ICD-10-CM

## 2023-02-01 DIAGNOSIS — F10.20 ALCOHOL DEPENDENCE, CONTINUOUS (H): Primary | ICD-10-CM

## 2023-02-01 DIAGNOSIS — Z11.52 ENCOUNTER FOR SCREENING LABORATORY TESTING FOR SEVERE ACUTE RESPIRATORY SYNDROME CORONAVIRUS 2 (SARS-COV-2): ICD-10-CM

## 2023-02-01 DIAGNOSIS — F17.203 NICOTINE DEPENDENCE WITH WITHDRAWAL, UNSPECIFIED NICOTINE PRODUCT TYPE: ICD-10-CM

## 2023-02-01 DIAGNOSIS — F10.220 ALCOHOL DEPENDENCE WITH UNCOMPLICATED INTOXICATION (H): ICD-10-CM

## 2023-02-01 LAB
ALBUMIN SERPL-MCNC: 3.9 G/DL (ref 3.4–5)
ALP SERPL-CCNC: 93 U/L (ref 40–150)
ALT SERPL W P-5'-P-CCNC: 26 U/L (ref 0–50)
AMPHETAMINES UR QL SCN: ABNORMAL
ANION GAP SERPL CALCULATED.3IONS-SCNC: 9 MMOL/L (ref 3–14)
AST SERPL W P-5'-P-CCNC: 27 U/L (ref 0–45)
BARBITURATES UR QL: ABNORMAL
BASOPHILS # BLD AUTO: 0.1 10E3/UL (ref 0–0.2)
BASOPHILS NFR BLD AUTO: 1 %
BENZODIAZ UR QL: ABNORMAL
BILIRUB SERPL-MCNC: 0.5 MG/DL (ref 0.2–1.3)
BUN SERPL-MCNC: 10 MG/DL (ref 7–30)
CALCIUM SERPL-MCNC: 9.2 MG/DL (ref 8.5–10.1)
CANNABINOIDS UR QL SCN: ABNORMAL
CHLORIDE BLD-SCNC: 106 MMOL/L (ref 94–109)
CO2 SERPL-SCNC: 23 MMOL/L (ref 20–32)
COCAINE UR QL: ABNORMAL
CREAT SERPL-MCNC: 0.65 MG/DL (ref 0.52–1.04)
EOSINOPHIL # BLD AUTO: 0.1 10E3/UL (ref 0–0.7)
EOSINOPHIL NFR BLD AUTO: 1 %
ERYTHROCYTE [DISTWIDTH] IN BLOOD BY AUTOMATED COUNT: 12.7 % (ref 10–15)
GFR SERPL CREATININE-BSD FRML MDRD: >90 ML/MIN/1.73M2
GLUCOSE BLD-MCNC: 118 MG/DL (ref 70–99)
HCT VFR BLD AUTO: 40.6 % (ref 35–47)
HGB BLD-MCNC: 14.2 G/DL (ref 11.7–15.7)
IMM GRANULOCYTES # BLD: 0 10E3/UL
IMM GRANULOCYTES NFR BLD: 0 %
LYMPHOCYTES # BLD AUTO: 2.3 10E3/UL (ref 0.8–5.3)
LYMPHOCYTES NFR BLD AUTO: 33 %
MCH RBC QN AUTO: 30.8 PG (ref 26.5–33)
MCHC RBC AUTO-ENTMCNC: 35 G/DL (ref 31.5–36.5)
MCV RBC AUTO: 88 FL (ref 78–100)
MONOCYTES # BLD AUTO: 0.5 10E3/UL (ref 0–1.3)
MONOCYTES NFR BLD AUTO: 8 %
NEUTROPHILS # BLD AUTO: 4 10E3/UL (ref 1.6–8.3)
NEUTROPHILS NFR BLD AUTO: 57 %
NRBC # BLD AUTO: 0 10E3/UL
NRBC BLD AUTO-RTO: 0 /100
OPIATES UR QL SCN: ABNORMAL
PLATELET # BLD AUTO: 284 10E3/UL (ref 150–450)
POTASSIUM BLD-SCNC: 3.1 MMOL/L (ref 3.4–5.3)
PROT SERPL-MCNC: 7.9 G/DL (ref 6.8–8.8)
RBC # BLD AUTO: 4.61 10E6/UL (ref 3.8–5.2)
SARS-COV-2 RNA RESP QL NAA+PROBE: NEGATIVE
SODIUM SERPL-SCNC: 138 MMOL/L (ref 133–144)
WBC # BLD AUTO: 7 10E3/UL (ref 4–11)

## 2023-02-01 PROCEDURE — 80053 COMPREHEN METABOLIC PANEL: CPT | Performed by: EMERGENCY MEDICINE

## 2023-02-01 PROCEDURE — 250N000011 HC RX IP 250 OP 636: Performed by: PSYCHIATRY & NEUROLOGY

## 2023-02-01 PROCEDURE — 250N000013 HC RX MED GY IP 250 OP 250 PS 637: Performed by: EMERGENCY MEDICINE

## 2023-02-01 PROCEDURE — 99285 EMERGENCY DEPT VISIT HI MDM: CPT | Performed by: EMERGENCY MEDICINE

## 2023-02-01 PROCEDURE — 85025 COMPLETE CBC W/AUTO DIFF WBC: CPT | Performed by: EMERGENCY MEDICINE

## 2023-02-01 PROCEDURE — C9803 HOPD COVID-19 SPEC COLLECT: HCPCS | Performed by: EMERGENCY MEDICINE

## 2023-02-01 PROCEDURE — U0005 INFEC AGEN DETEC AMPLI PROBE: HCPCS | Performed by: EMERGENCY MEDICINE

## 2023-02-01 PROCEDURE — 80307 DRUG TEST PRSMV CHEM ANLYZR: CPT | Performed by: EMERGENCY MEDICINE

## 2023-02-01 PROCEDURE — 99222 1ST HOSP IP/OBS MODERATE 55: CPT | Performed by: PHYSICIAN ASSISTANT

## 2023-02-01 PROCEDURE — 128N000004 HC R&B CD ADULT

## 2023-02-01 PROCEDURE — 250N000013 HC RX MED GY IP 250 OP 250 PS 637: Performed by: PHYSICIAN ASSISTANT

## 2023-02-01 PROCEDURE — 99284 EMERGENCY DEPT VISIT MOD MDM: CPT | Performed by: EMERGENCY MEDICINE

## 2023-02-01 PROCEDURE — 250N000013 HC RX MED GY IP 250 OP 250 PS 637: Performed by: FAMILY MEDICINE

## 2023-02-01 PROCEDURE — H2032 ACTIVITY THERAPY, PER 15 MIN: HCPCS

## 2023-02-01 PROCEDURE — 99223 1ST HOSP IP/OBS HIGH 75: CPT | Mod: AI | Performed by: PSYCHIATRY & NEUROLOGY

## 2023-02-01 PROCEDURE — 36415 COLL VENOUS BLD VENIPUNCTURE: CPT | Performed by: EMERGENCY MEDICINE

## 2023-02-01 PROCEDURE — HZ2ZZZZ DETOXIFICATION SERVICES FOR SUBSTANCE ABUSE TREATMENT: ICD-10-PCS | Performed by: PSYCHIATRY & NEUROLOGY

## 2023-02-01 PROCEDURE — 250N000013 HC RX MED GY IP 250 OP 250 PS 637: Performed by: PSYCHIATRY & NEUROLOGY

## 2023-02-01 RX ORDER — MIRTAZAPINE 15 MG/1
15 TABLET, FILM COATED ORAL AT BEDTIME
COMMUNITY
End: 2023-02-01

## 2023-02-01 RX ORDER — DIAZEPAM 5 MG
5-20 TABLET ORAL EVERY 30 MIN PRN
Status: DISCONTINUED | OUTPATIENT
Start: 2023-02-01 | End: 2023-02-01

## 2023-02-01 RX ORDER — POLYETHYLENE GLYCOL 3350 17 G
4 POWDER IN PACKET (EA) ORAL
Status: DISCONTINUED | OUTPATIENT
Start: 2023-02-01 | End: 2023-02-01

## 2023-02-01 RX ORDER — CLONIDINE HYDROCHLORIDE 0.1 MG/1
0.2 TABLET ORAL ONCE
Status: COMPLETED | OUTPATIENT
Start: 2023-02-01 | End: 2023-02-01

## 2023-02-01 RX ORDER — TRAZODONE HYDROCHLORIDE 50 MG/1
50 TABLET, FILM COATED ORAL
Status: DISCONTINUED | OUTPATIENT
Start: 2023-02-01 | End: 2023-02-03 | Stop reason: HOSPADM

## 2023-02-01 RX ORDER — ACETAMINOPHEN 325 MG/1
650 TABLET ORAL EVERY 4 HOURS PRN
Status: DISCONTINUED | OUTPATIENT
Start: 2023-02-01 | End: 2023-02-03 | Stop reason: HOSPADM

## 2023-02-01 RX ORDER — DIAZEPAM 5 MG
5-20 TABLET ORAL EVERY 30 MIN PRN
Status: DISCONTINUED | OUTPATIENT
Start: 2023-02-01 | End: 2023-02-03

## 2023-02-01 RX ORDER — NICOTINE 21 MG/24HR
1 PATCH, TRANSDERMAL 24 HOURS TRANSDERMAL DAILY
Status: DISCONTINUED | OUTPATIENT
Start: 2023-02-01 | End: 2023-02-03 | Stop reason: HOSPADM

## 2023-02-01 RX ORDER — GABAPENTIN 100 MG/1
100 CAPSULE ORAL 3 TIMES DAILY
Status: ON HOLD | COMMUNITY
End: 2023-02-03

## 2023-02-01 RX ORDER — HYDRALAZINE HYDROCHLORIDE 25 MG/1
25 TABLET, FILM COATED ORAL 4 TIMES DAILY PRN
Status: DISCONTINUED | OUTPATIENT
Start: 2023-02-01 | End: 2023-02-03 | Stop reason: HOSPADM

## 2023-02-01 RX ORDER — POTASSIUM CHLORIDE 1.5 G/1.58G
20 POWDER, FOR SOLUTION ORAL ONCE
Status: COMPLETED | OUTPATIENT
Start: 2023-02-01 | End: 2023-02-01

## 2023-02-01 RX ORDER — MULTIPLE VITAMINS W/ MINERALS TAB 9MG-400MCG
1 TAB ORAL DAILY
Status: DISCONTINUED | OUTPATIENT
Start: 2023-02-02 | End: 2023-02-03 | Stop reason: HOSPADM

## 2023-02-01 RX ORDER — AMOXICILLIN 250 MG
1 CAPSULE ORAL 2 TIMES DAILY PRN
Status: DISCONTINUED | OUTPATIENT
Start: 2023-02-01 | End: 2023-02-03 | Stop reason: HOSPADM

## 2023-02-01 RX ORDER — LOPERAMIDE HCL 2 MG
2 CAPSULE ORAL 4 TIMES DAILY PRN
Status: DISCONTINUED | OUTPATIENT
Start: 2023-02-01 | End: 2023-02-03 | Stop reason: HOSPADM

## 2023-02-01 RX ORDER — POTASSIUM CHLORIDE 1500 MG/1
40 TABLET, EXTENDED RELEASE ORAL ONCE
Status: COMPLETED | OUTPATIENT
Start: 2023-02-01 | End: 2023-02-01

## 2023-02-01 RX ORDER — MIRTAZAPINE 15 MG/1
15 TABLET, FILM COATED ORAL AT BEDTIME
Status: DISCONTINUED | OUTPATIENT
Start: 2023-02-01 | End: 2023-02-03 | Stop reason: HOSPADM

## 2023-02-01 RX ORDER — HYDROXYZINE HYDROCHLORIDE 25 MG/1
25 TABLET, FILM COATED ORAL ONCE
Status: COMPLETED | OUTPATIENT
Start: 2023-02-01 | End: 2023-02-01

## 2023-02-01 RX ORDER — FOLIC ACID 1 MG/1
1 TABLET ORAL DAILY
Status: DISCONTINUED | OUTPATIENT
Start: 2023-02-01 | End: 2023-02-01

## 2023-02-01 RX ORDER — NICOTINE 21 MG/24HR
1 PATCH, TRANSDERMAL 24 HOURS TRANSDERMAL DAILY
Status: DISCONTINUED | OUTPATIENT
Start: 2023-02-01 | End: 2023-02-01

## 2023-02-01 RX ORDER — MAGNESIUM HYDROXIDE/ALUMINUM HYDROXICE/SIMETHICONE 120; 1200; 1200 MG/30ML; MG/30ML; MG/30ML
30 SUSPENSION ORAL EVERY 4 HOURS PRN
Status: DISCONTINUED | OUTPATIENT
Start: 2023-02-01 | End: 2023-02-03 | Stop reason: HOSPADM

## 2023-02-01 RX ORDER — FOLIC ACID 1 MG/1
1 TABLET ORAL DAILY
Status: DISCONTINUED | OUTPATIENT
Start: 2023-02-02 | End: 2023-02-03 | Stop reason: HOSPADM

## 2023-02-01 RX ORDER — ATENOLOL 50 MG/1
50 TABLET ORAL DAILY PRN
Status: DISCONTINUED | OUTPATIENT
Start: 2023-02-01 | End: 2023-02-03 | Stop reason: HOSPADM

## 2023-02-01 RX ORDER — HYDROXYZINE HYDROCHLORIDE 25 MG/1
25 TABLET, FILM COATED ORAL EVERY 4 HOURS PRN
Status: DISCONTINUED | OUTPATIENT
Start: 2023-02-01 | End: 2023-02-03 | Stop reason: HOSPADM

## 2023-02-01 RX ORDER — ONDANSETRON 4 MG/1
4 TABLET, ORALLY DISINTEGRATING ORAL EVERY 6 HOURS PRN
Status: DISCONTINUED | OUTPATIENT
Start: 2023-02-01 | End: 2023-02-03 | Stop reason: HOSPADM

## 2023-02-01 RX ORDER — CLONIDINE HYDROCHLORIDE 0.1 MG/1
0.1 TABLET ORAL ONCE
Status: COMPLETED | OUTPATIENT
Start: 2023-02-01 | End: 2023-02-01

## 2023-02-01 RX ORDER — MIRTAZAPINE 30 MG/1
30 TABLET, FILM COATED ORAL AT BEDTIME
Status: ON HOLD | COMMUNITY
End: 2023-02-03

## 2023-02-01 RX ORDER — GABAPENTIN 300 MG/1
300 CAPSULE ORAL 3 TIMES DAILY
Status: DISCONTINUED | OUTPATIENT
Start: 2023-02-01 | End: 2023-02-03 | Stop reason: HOSPADM

## 2023-02-01 RX ORDER — HYDROXYZINE HYDROCHLORIDE 25 MG/1
25-50 TABLET, FILM COATED ORAL EVERY 4 HOURS PRN
Status: ON HOLD | COMMUNITY
End: 2023-06-13

## 2023-02-01 RX ADMIN — DIAZEPAM 5 MG: 5 TABLET ORAL at 09:24

## 2023-02-01 RX ADMIN — FOLIC ACID 1 MG: 1 TABLET ORAL at 07:38

## 2023-02-01 RX ADMIN — POTASSIUM CHLORIDE 40 MEQ: 1500 TABLET, EXTENDED RELEASE ORAL at 13:45

## 2023-02-01 RX ADMIN — HYDROXYZINE HYDROCHLORIDE 25 MG: 25 TABLET, FILM COATED ORAL at 06:30

## 2023-02-01 RX ADMIN — CLONIDINE HYDROCHLORIDE 0.2 MG: 0.1 TABLET ORAL at 09:09

## 2023-02-01 RX ADMIN — MIRTAZAPINE 15 MG: 15 TABLET, FILM COATED ORAL at 21:47

## 2023-02-01 RX ADMIN — ONDANSETRON 4 MG: 4 TABLET, ORALLY DISINTEGRATING ORAL at 12:23

## 2023-02-01 RX ADMIN — TRAZODONE HYDROCHLORIDE 50 MG: 50 TABLET ORAL at 21:47

## 2023-02-01 RX ADMIN — DIAZEPAM 5 MG: 5 TABLET ORAL at 16:58

## 2023-02-01 RX ADMIN — GABAPENTIN 300 MG: 300 CAPSULE ORAL at 15:17

## 2023-02-01 RX ADMIN — GABAPENTIN 300 MG: 300 CAPSULE ORAL at 20:14

## 2023-02-01 RX ADMIN — CLONIDINE HYDROCHLORIDE 0.1 MG: 0.1 TABLET ORAL at 07:38

## 2023-02-01 RX ADMIN — DIAZEPAM 5 MG: 5 TABLET ORAL at 14:10

## 2023-02-01 RX ADMIN — NICOTINE POLACRILEX 4 MG: 4 GUM, CHEWING BUCCAL at 16:58

## 2023-02-01 RX ADMIN — POTASSIUM CHLORIDE 20 MEQ: 1.5 POWDER, FOR SOLUTION ORAL at 07:38

## 2023-02-01 RX ADMIN — DIAZEPAM 10 MG: 5 TABLET ORAL at 12:23

## 2023-02-01 RX ADMIN — NICOTINE POLACRILEX 4 MG: 4 LOZENGE ORAL at 20:14

## 2023-02-01 RX ADMIN — PAROXETINE 30 MG: 10 TABLET, FILM COATED ORAL at 15:17

## 2023-02-01 RX ADMIN — THIAMINE HCL TAB 100 MG 100 MG: 100 TAB at 07:38

## 2023-02-01 RX ADMIN — DIAZEPAM 10 MG: 5 TABLET ORAL at 06:29

## 2023-02-01 ASSESSMENT — ACTIVITIES OF DAILY LIVING (ADL)
WALKING_OR_CLIMBING_STAIRS_DIFFICULTY: NO
ADLS_ACUITY_SCORE: 35
HEARING_DIFFICULTY_OR_DEAF: NO
ADLS_ACUITY_SCORE: 30
WEAR_GLASSES_OR_BLIND: YES
TOILETING_ISSUES: NO
DIFFICULTY_EATING/SWALLOWING: NO
ADLS_ACUITY_SCORE: 30
ADLS_ACUITY_SCORE: 30
FALL_HISTORY_WITHIN_LAST_SIX_MONTHS: NO
DRESSING/BATHING_DIFFICULTY: NO
ADLS_ACUITY_SCORE: 30
DIFFICULTY_COMMUNICATING: NO
CHANGE_IN_FUNCTIONAL_STATUS_SINCE_ONSET_OF_CURRENT_ILLNESS/INJURY: NO
CONCENTRATING,_REMEMBERING_OR_MAKING_DECISIONS_DIFFICULTY: NO
ADLS_ACUITY_SCORE: 30
DOING_ERRANDS_INDEPENDENTLY_DIFFICULTY: NO
ADLS_ACUITY_SCORE: 35
ADLS_ACUITY_SCORE: 35
ADLS_ACUITY_SCORE: 30

## 2023-02-01 ASSESSMENT — LIFESTYLE VARIABLES
AUDIT-C TOTAL SCORE: 12
SKIP TO QUESTIONS 9-10: 0

## 2023-02-01 NOTE — TELEPHONE ENCOUNTER
S: [facility] , Provider Dr. August calling at 0612 with clinical on a 57 year old/Female presenting for alcohol detox.     B: Pt presents for ETOH detox.   Currently reports drinking 1 L daily for the past week.    Patient reports last use was around midnight.  Pt ABT: 0.095 at 0609  Pt  denies hx of DT  Pt  endorses hx of seizures. Last seizure: Denied recent seizures  Pt endorsing the following symptoms of withdrawal: None  MSSA Score: 9 at 0622.  MSSA protocol with Valium ordered,    Pt denies acute mental health or medical concerns.   Pt denies other drug use: Cannabis Amount/frequency: Within the past week    Does Pt have a detox care plan in [facility]? No  Does pt present with specific needs, assistive devices, or exclusionary criteria? None  Is the patient ambulating, eating and drinking in the ED? Yes    A: Pt meets criteria to be presented for IP detox admission. Patient is voluntary  COVID: In process  Utox: Ordered, not yet collected  CMP: In process  CBC: WNL  HCG: N/A     R: Patient cleared and ready for behavioral bed placement: Yes    Presenting for admission to 3A/CD     0700 COVID and CMP in process.  Awaiting results.  Case has been passed to days to track.

## 2023-02-01 NOTE — PROGRESS NOTES
02/01/23 9095   Patient Belongings   Did you bring any home meds/supplements to the hospital?  No   Patient Belongings other (see comments)   Patient Belongings Remaining with Patient other (see comments)   Patient Belongings Put in Hospital Secure Location (Security or Locker, etc.) other (see comments)   Belongings Search Yes   Clothing Search Yes   Second Staff Radha YBARRA and Deann ELMORE RN   Comment Items placed in storage per unit process     Storage Bin: Coat, purse w/ miscellaneous items  Med Room: Cell phone, wallet, keys, lighter  SECURITY #[id number]: 1 MN ID, 3 Credit/debit cards    A               Admission:  I am responsible for any personal items that are not sent to the safe or pharmacy.  [facility] is not responsible for loss, theft or damage of any property in my possession.    Signature:  _________________________________ Date: _______  Time: _____                                              Staff Signature:  ____________________________ Date: ________  Time: _____      2nd Staff person, if patient is unable/unwilling to sign:    Signature: ________________________________ Date: ________  Time: _____     Discharge:  [facility] has returned all of my personal belongings:    Signature: _________________________________ Date: ________  Time: _____                                          Staff Signature:  ____________________________ Date: ________  Time: _____
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
## 2023-02-01 NOTE — ED PROVIDER NOTES
ED Provider Note  [facility]      History     Chief Complaint   Patient presents with     Alcohol Intoxication     Wants detox last drink midnight     HPI  Steph Drew is a 57 year old female who has a PMHx of etoh detox presenting with request for detox. She has drank 1L of vodka per day for the past week. Also uses marijuana.  She denies any thoughts of hurting her self or others.  No hallucinations or voices.  The patient denies abdominal pain, nausea, vomiting or recent illnesses.  Denies any medical problems.  Last drink was at about midnight overnight today.    Past Medical History  Past Medical History:   Diagnosis Date     Drug dependence (H)     2004, treatment at [facility]     History of other genital system and obstetric disorders     G2, P0-0-2-0     History of other infectious or parasitic disease     Childhood     Nondependent alcohol abuse, in remission     No Comments Provided     Past Surgical History:   Procedure Laterality Date     APPENDECTOMY OPEN      Age 17     TONSILLECTOMY, ADENOIDECTOMY, COMBINED      Age 8     multivitamin, therapeutic with minerals (THERA-VIT-M) TABS tablet  nicotine polacrilex (NICORETTE) 4 MG gum  PARoxetine (PAXIL) 30 MG tablet      No Known Allergies  Family History  Family History   Problem Relation Age of Onset     Family History Negative Mother         Good Health     Family History Negative Father         Good Health     Family History Negative Sister         Good Health     Family History Negative Brother         Good Health     Family History Negative Brother         Good Health     Family History Negative Brother         Good Health     Social History   Social History     Tobacco Use     Smoking status: Every Day     Packs/day: 1.00     Types: Cigarettes     Smokeless tobacco: Never   Substance Use Topics     Alcohol use: Yes     Drug use: Yes     Types: Marijuana      Past medical history, past surgical history, medications,  allergies, family history, and social history were reviewed with the patient. No additional pertinent items.      A medically appropriate review of systems was performed with pertinent positives and negatives noted in the HPI, and all other systems negative.    Physical Exam      Physical Exam  Physical Exam   Constitutional: oriented to person, place, and time. appears well-developed and well-nourished.   HENT:   Head: Normocephalic and atraumatic.   Neck: Normal range of motion.   Pulmonary/Chest: Effort normal. No respiratory distress.   Cardiac: No murmurs, rubs, gallops. RRR.  Abdominal: Abdomen soft, nontender, nondistended. No rebound tenderness.  MSK: Long bones without deformity or evidence of trauma  Neurological: alert and oriented to person, place, and time. Stable gait.  No tremor.  No tongue fasciculations.  Pupils 3 mm and reactive bilaterally.  No nystagmus.  Skin: Skin is warm and dry.   Psychiatric:  normal mood and affect.  behavior is normal. Thought content normal.       ED Course, Procedures, & Data      Procedures            No results found for any visits on 02/01/23.  Medications - No data to display  Labs Ordered and Resulted from Time of ED Arrival to Time of ED Departure - No data to display  No orders to display          Medical Decision Making  The patient's presentation is strongly suggestive of a chronic illness severe exacerbation, progression, or side effect of treatment.    The patient's evaluation involved:  an assessment requiring an independent historian (EMS)  review of external note(s) from 1 sources (prior hospitalization)  ordering and/or review of 3+ test(s) in this encounter (see separate area of note for details)    The patient's management involved limitations due to social determinants of health (substance dependence) and a decision regarding hospitalization.      Assessment & Plan    MDM  Patient presenting with request for detox.  There is a bed available that was  reserved.  We will get labs and anticipate admission to detox.  She will be placed on the MSSA protocol and signed out to oncoming provider.    I have reviewed the nursing notes. I have reviewed the findings, diagnosis, plan and need for follow up with the patient.    New Prescriptions    No medications on file       Final diagnoses:   Alcohol dependence with uncomplicated intoxication (H)       Juan Luis August  [facility] EMERGENCY DEPARTMENT  2/1/2023     Juan Luis August MD  02/01/23 0616

## 2023-02-01 NOTE — PLAN OF CARE
"Patient admitted to . 3A from [facility] ED for alcohol detox. Has been drinking Vodka one Liter \"on and off\" and \"almost daily\" since September. Last use today (2/1) around 00:00 (midnight).     UDS positive for marijuana. Denies other substance use. Uses daily. Last use 01/31/23.    States she has been to detox 10+ times. Last time \"was here, about 5 years ago.\"     Blood pressures high in ED and treated with Clonidine. Continues to be elevated on admission 173/101, HR 72. Denies to writer history of hypertension, attributes to \"anxiety.\" Denies physical symptoms such as headache, blurred vision, pain. Attending MD made aware and awaiting directives. On-call medicine provider also paged and awaiting directives. Staff nurse caring for patient made aware.     Denies history of delerium tremons.    Endorses history of seizure. \"I'm not really sure what they are of if I had one though.\" Monitor on seizure precautions.    ALT 26  AST 27    Monitor on MSSA with Vallium. Received a total of Valium 15 mg in the ED prior to arrival to unit. Monitor on withdrawal precautions. MSSA on admission 10. Will provide Valium 10 mg.      Patient is calm, pleasant and cooperative on admission. Endorses history of passive suicidal thoughts, \"to not wake up\" with no plan or intent. Denies current suicidal thoughts. Denies psychotic symptoms. Denies self-injury. Denies thoughts to harm others. States she is feeling \"anxious\" and \"tired.\"     Provider paged to order nicotine patch for patient.     Provided verbal report to unit staff who will be caring for the patient.     Pronounces name Raúl.   "

## 2023-02-01 NOTE — ED TRIAGE NOTES
Triage Assessment     Row Name 02/01/23 0616       Triage Assessment (Adult)    Airway WDL WDL       Respiratory WDL    Respiratory WDL WDL       Skin Circulation/Temperature WDL    Skin Circulation/Temperature WDL WDL       Cardiac WDL    Cardiac WDL WDL       Peripheral/Neurovascular WDL    Peripheral Neurovascular WDL WDL       Cognitive/Neuro/Behavioral WDL    Cognitive/Neuro/Behavioral WDL WDL

## 2023-02-01 NOTE — TELEPHONE ENCOUNTER
R:  Patient cleared and ready for behavioral bed placement: Yes     COVID:  Negative  Utox: Ordered, not yet collected  CMP: Potassium was 3.1 - replaced  CBC: WNL  HCG: N/A       Provider Sonam paged @ 8:12a to present for 3A/Sonam    CD Admit  Dr Masters called back @ 8:32am and accepted pt for - 3A/CD Admit  - Sonam  Pt placed in queue and charge called with disposition @ 8:35am  [facility]  ED updated with Placement @ 8:38am  Brendon and Amaya completed.

## 2023-02-01 NOTE — CONSULTS
[facility]  Internal Medicine Consult     Steph Drew MRN# [id number]   Age: 57 year old YOB: 1965     Date of Admission: 2/1/2023  Date of Consult: 2/1/2023    Primary Care Provider: Ariella Bell    Requesting Service: Behavioral Health - Ras Masters MD  Reason for Consult: General Medical Evaluation      SUBJECTIVE   CC:   Elevated BP   Assessment and Plan/Recommendations:   Steph Drew is a 57 year old female with history of depression and etoh abuse who was admitted to station 3A with etoh withdrawal    Alcohol dependency with acute withdrawal, depression: Drinking 1L vodka daily x1 week  - Management per psych     Elevated BP: BP 130s-190s/80s-110 in the setting of acute withdrawal. Treated with clonidine in the ED. No PTA meds or HTN diagnosis  - Hydralazine prn  - Medicine will continue to follow BP    Hypokalemia: K 3.1 in the ED. Treated with 20 mEq Kdur. Likely due to poor oral intake PTA.  - Give another 40 mEq Kdur  - Contact medicine if patient unable to tolerate PO intake in the next 24 hours    Medicine will peripherally follow BP. Please contact if future questions or concerns arise. Thank you for the opportunity to be a part of this patient's care.      Sonia Childress PA-C  Internal Medicine LEE Hospitalist  Page job code [phone] or [phone] ([facility] and )  Text paging via [employer] is appreciated  February 1, 2023         HPI:   Steph Drew is a 57 year old female with history of depression and etoh abuse who was admitted to station 3A with etoh withdrawal    Patient currently reports feeling tired, restless, and anxious. Appetite is low, having some nausea. Tired but sleeping between interruptions. Was very shaky but that got better with valium. Denies visual changes or confusion. No hallucinations. Denies recent illness, fever, chest pain, dyspnea, cough, urinary symptoms, change in bowels, peripheral  "edema, new onset joint pain, or rash. Denies current medical concerns        Past Medical History:     Past Medical History:   Diagnosis Date     Drug dependence (H)     2004, treatment at [facility]     History of other genital system and obstetric disorders     G2, P0-0-2-0     History of other infectious or parasitic disease     Childhood     Nondependent alcohol abuse, in remission     No Comments Provided        Reviewed and updated in [facility].     Past Surgical History:      Past Surgical History:   Procedure Laterality Date     APPENDECTOMY OPEN      Age 17     TONSILLECTOMY, ADENOIDECTOMY, COMBINED      Age 8         Social History:     Social History     Tobacco Use     Smoking status: Every Day     Packs/day: 1.00     Types: Cigarettes     Smokeless tobacco: Never   Substance Use Topics     Alcohol use: Yes     Drug use: Yes     Types: Marijuana        Family History:     Family History   Problem Relation Age of Onset     Family History Negative Mother         Good Health     Family History Negative Father         Good Health     Family History Negative Sister         Good Health     Family History Negative Brother         Good Health     Family History Negative Brother         Good Health     Family History Negative Brother         Good Health         Allergies:   No Known Allergies      Medications:   Reviewed. Please see MAR     Review of Systems:   10 point ROS of systems including Constitutional, Eyes, Respiratory, Cardiovascular, Gastroenterology, Genitourinary, Integumentary, Muscularskeletal, Psychiatric were all negative except for pertinent positives noted in my HPI.    OBJECTIVE   Physical Exam:   Vitals were reviewed  Blood pressure (!) 173/101, pulse 72, temperature 98.1  F (36.7  C), temperature source Oral, resp. rate 16, height 1.676 m (5' 6\"), weight 76.8 kg (169 lb 5 oz), SpO2 98 %, not currently breastfeeding.  General: Alert and oriented x3, pleasant. Conversational but resting in " bed  HEENT: Anicteric sclera, MMM  Cardiovascular: RRR, S1S2. No murmur noted  Lungs: CTAB without wheezing or crackles   GI: Abdomen soft, non-tender with bowel sounds present. No guarding or rebound   Vascular: No peripheral edema, distal pulses palpable  Neurologic: No focal deficits, CN II-XII grossly intact  Skin: No jaundice, rashes, or lesions        Data:        Lab Results   Component Value Date     02/01/2023     03/24/2018    Lab Results   Component Value Date    CHLORIDE 106 02/01/2023    CHLORIDE 102 03/24/2018    Lab Results   Component Value Date    BUN 10 02/01/2023    BUN 4 03/24/2018      Lab Results   Component Value Date    POTASSIUM 3.1 02/01/2023    POTASSIUM 3.6 03/25/2018    Lab Results   Component Value Date    CO2 23 02/01/2023    CO2 31 03/24/2018    Lab Results   Component Value Date    CR 0.65 02/01/2023    CR 0.62 03/24/2018        Lab Results   Component Value Date    WBC 7.0 02/01/2023    HGB 14.2 02/01/2023    HCT 40.6 02/01/2023    MCV 88 02/01/2023     02/01/2023     Lab Results   Component Value Date    WBC 7.0 02/01/2023

## 2023-02-01 NOTE — ED PROVIDER NOTES
Emergency Department Patient Sign-out       Brief HPI:  This is a 57 year old female signed out to me by Dr. August .  See initial ED Provider note for details of the presentation.            Significant Events prior to my assuming care: 57-year-old female with a history of alcoholism who presented seeking detox from alcohol.  Reported drinking up to a liter of vodka per day for at least the past week and also using marijuana.  No other concerns or complaints.  She was placed in the queue for detox admission and labs were ordered the results of which were not available at the time of signout.  Also noted to be hypertensive.  No intervention on blood pressure prior to my arrival.  Patient does not report a history of hypertension however review of her chart from multiple previous visits reveals numerous blood pressure readings as high as 160s over above 100 diastolic.      Exam:   Patient Vitals for the past 24 hrs:   BP Temp Temp src Pulse Resp SpO2   02/01/23 0615 (!) 183/110 98  F (36.7  C) Oral 70 18 97 %     General: Patient is in no acute distress and is resting comfortably.  HEENT: Normocephalic atraumatic  Neck: Supple  Cardiovascular: Heart rate normal  Pulmonary: Patient is in no respiratory distress  Extremities: No signs of any significant or life-threatening trauma.  Neurologic: No new focal neurologic deficits.      ED RESULTS:   Results for orders placed or performed during the hospital encounter of 02/01/23 (from the past 24 hour(s))   Asymptomatic COVID-19 Virus (Coronavirus) by PCR Nasopharyngeal     Status: Normal    Collection Time: 02/01/23  6:33 AM    Specimen: Nasopharyngeal; Swab   Result Value Ref Range    SARS CoV2 PCR Negative Negative    Narrative    Testing was performed using the Xpert Xpress SARS-CoV-2 Assay on the Cepheid Gene-Xpert Instrument Systems. Additional information about this Emergency Use Authorization (EUA) assay can be found via the Lab Guide. This test should be  ordered for the detection of SARS-CoV-2 in individuals who meet SARS-CoV-2 clinical and/or epidemiological criteria as well as from individuals without symptoms or other reasons to suspect COVID-19. Test performance for asymptomatic patients has only been established in anterior nasal swab specimens. This test is for in vitro diagnostic use under the FDA EUA for laboratories certified under CLIA to perform high complexity testing. This test has not been FDA cleared or approved. A negative result does not rule out the presence of PCR inhibitors in the specimen or target RNA concentration below the limit of detection for the assay. The possibility of a false negative should be considered if the patient's recent exposure or clinical presentation suggests COVID-19. This test was validated by the [facility]. This laboratory is certified under the Clinical Laboratory Improvement Amendments (CLIA) as qualified to perform high complexity laboratory testing.     CBC with platelets differential     Status: None    Collection Time: 02/01/23  6:46 AM    Narrative    The following orders were created for panel order CBC with platelets differential.  Procedure                               Abnormality         Status                     ---------                               -----------         ------                     CBC with platelets and d...[[id number]]                      Final result                 Please view results for these tests on the individual orders.   Comprehensive metabolic panel     Status: Abnormal    Collection Time: 02/01/23  6:46 AM   Result Value Ref Range    Sodium 138 133 - 144 mmol/L    Potassium 3.1 (L) 3.4 - 5.3 mmol/L    Chloride 106 94 - 109 mmol/L    Carbon Dioxide (CO2) 23 20 - 32 mmol/L    Anion Gap 9 3 - 14 mmol/L    Urea Nitrogen 10 7 - 30 mg/dL    Creatinine 0.65 0.52 - 1.04 mg/dL    Calcium 9.2 8.5 - 10.1 mg/dL    Glucose 118 (H) 70 - 99 mg/dL     Alkaline Phosphatase 93 40 - 150 U/L    AST 27 0 - 45 U/L    ALT 26 0 - 50 U/L    Protein Total 7.9 6.8 - 8.8 g/dL    Albumin 3.9 3.4 - 5.0 g/dL    Bilirubin Total 0.5 0.2 - 1.3 mg/dL    GFR Estimate >90 >60 mL/min/1.73m2   CBC with platelets and differential     Status: None    Collection Time: 02/01/23  6:46 AM   Result Value Ref Range    WBC Count 7.0 4.0 - 11.0 10e3/uL    RBC Count 4.61 3.80 - 5.20 10e6/uL    Hemoglobin 14.2 11.7 - 15.7 g/dL    Hematocrit 40.6 35.0 - 47.0 %    MCV 88 78 - 100 fL    MCH 30.8 26.5 - 33.0 pg    MCHC 35.0 31.5 - 36.5 g/dL    RDW 12.7 10.0 - 15.0 %    Platelet Count 284 150 - 450 10e3/uL    % Neutrophils 57 %    % Lymphocytes 33 %    % Monocytes 8 %    % Eosinophils 1 %    % Basophils 1 %    % Immature Granulocytes 0 %    NRBCs per 100 WBC 0 <1 /100    Absolute Neutrophils 4.0 1.6 - 8.3 10e3/uL    Absolute Lymphocytes 2.3 0.8 - 5.3 10e3/uL    Absolute Monocytes 0.5 0.0 - 1.3 10e3/uL    Absolute Eosinophils 0.1 0.0 - 0.7 10e3/uL    Absolute Basophils 0.1 0.0 - 0.2 10e3/uL    Absolute Immature Granulocytes 0.0 <=0.4 10e3/uL    Absolute NRBCs 0.0 10e3/uL       ED MEDICATIONS:   Medications   diazepam (VALIUM) tablet 5-20 mg (10 mg Oral Given 2/1/23 0629)   thiamine (B-1) tablet 100 mg (has no administration in time range)   folic acid (FOLVITE) tablet 1 mg (has no administration in time range)   potassium chloride (KLOR-CON) Packet 20 mEq (has no administration in time range)   cloNIDine (CATAPRES) tablet 0.1 mg (has no administration in time range)   hydrOXYzine (ATARAX) tablet 25 mg (25 mg Oral Given 2/1/23 0630)         Impression:    ICD-10-CM    1. Alcohol dependence with uncomplicated intoxication (H)  F10.220           Plan:    Pending studies include labs low potassium 3.1.  Potassium was replaced.  LFTs within normal limits.  COVID-negative.  Other labs unremarkable.  Continue to monitor with MSSA protocol, was given a dose of clonidine.  Appears appropriate for detox  admission.      MD Que Plascencia David, MD  02/01/23 0730

## 2023-02-01 NOTE — ED NOTES
Bed: ED07  Expected date:   Expected time:   Means of arrival:   Comments:  Reilly [phone] y/o F ETOH   supervision

## 2023-02-01 NOTE — PHARMACY-ADMISSION MEDICATION HISTORY
Admission Medication History Completed by Pharmacy    See [facility] Admission Navigator for allergy information, preferred outpatient pharmacy, prior to admission medications and immunization status.     Medication History Sources:     Pharmacy fill data, Patient interview    Changes made to PTA medication list (reason):    Added: Gabapentin, hydroxyzine, and mirtazapine    Deleted: None    Changed: None    Additional Information:    Patient has an active order for mirtazapine, but does not take the medication while she is drinking.    Prior to Admission medications    Medication Sig Last Dose Taking? Auth Provider Long Term End Date   gabapentin (NEURONTIN) 100 MG capsule Take 100 mg by mouth 3 times daily Past Week Yes Unknown, Entered By History Yes    hydrOXYzine (ATARAX) 25 MG tablet Take 25-50 mg by mouth every 4 hours as needed for anxiety Past Month Yes Unknown, Entered By History     mirtazapine (REMERON) 30 MG tablet Take 30 mg by mouth At Bedtime More than a month Yes Unknown, Entered By History No    multivitamin, therapeutic with minerals (THERA-VIT-M) TABS tablet Take 1 tablet by mouth daily Past Week Yes Tiff Mccauley MD     PARoxetine (PAXIL) 30 MG tablet Take 1 tablet (30 mg) by mouth daily Past Week Yes Tiff Mccauley MD Yes    nicotine polacrilex (NICORETTE) 4 MG gum Place 1-2 each (4-8 mg) inside cheek every hour as needed for other (nicotine withdrawal symptoms)   Tiff Mccauley MD         Date completed: 02/01/23    Medication history completed by: Yemi Burton [facility]

## 2023-02-01 NOTE — PLAN OF CARE
"Goal Outcome Evaluation:    Patient had a short nap, woke up with pleasant affect. Vital signs rechecked 161/94, 68, MSSA 8, 5mg valium given. She denies si/sib/hallucinations and pain. No nausea and was offered snack, though she stated that she was not hungry. Medical team reviewed her, she's on hydralazine with parameter, potassium 60MEQ was given for K+3.1.   Patient reported anxiety 7/10, \"ashamed of what I did to myself\" and denied si/hi/hallucinations and pain. She will be monitored for safety.                 "

## 2023-02-02 LAB
CHOLEST SERPL-MCNC: 230 MG/DL
FOLATE SERPL-MCNC: 13.7 NG/ML (ref 4.6–34.8)
GGT SERPL-CCNC: 30 U/L (ref 0–40)
HBA1C MFR BLD: 4.8 % (ref 0–5.6)
HDLC SERPL-MCNC: 71 MG/DL
LDLC SERPL CALC-MCNC: 114 MG/DL
NONHDLC SERPL-MCNC: 159 MG/DL
POTASSIUM BLD-SCNC: 3.8 MMOL/L (ref 3.4–5.3)
TRIGL SERPL-MCNC: 224 MG/DL
TSH SERPL DL<=0.005 MIU/L-ACNC: 3.62 MU/L (ref 0.4–4)
VIT B12 SERPL-MCNC: 1065 PG/ML (ref 232–1245)

## 2023-02-02 PROCEDURE — 82607 VITAMIN B-12: CPT | Performed by: PSYCHIATRY & NEUROLOGY

## 2023-02-02 PROCEDURE — 99232 SBSQ HOSP IP/OBS MODERATE 35: CPT | Performed by: PSYCHIATRY & NEUROLOGY

## 2023-02-02 PROCEDURE — 84132 ASSAY OF SERUM POTASSIUM: CPT | Performed by: PSYCHIATRY & NEUROLOGY

## 2023-02-02 PROCEDURE — 36415 COLL VENOUS BLD VENIPUNCTURE: CPT | Performed by: PSYCHIATRY & NEUROLOGY

## 2023-02-02 PROCEDURE — 80061 LIPID PANEL: CPT | Performed by: PSYCHIATRY & NEUROLOGY

## 2023-02-02 PROCEDURE — 82977 ASSAY OF GGT: CPT | Performed by: PSYCHIATRY & NEUROLOGY

## 2023-02-02 PROCEDURE — 250N000013 HC RX MED GY IP 250 OP 250 PS 637: Performed by: PSYCHIATRY & NEUROLOGY

## 2023-02-02 PROCEDURE — 83036 HEMOGLOBIN GLYCOSYLATED A1C: CPT | Performed by: PSYCHIATRY & NEUROLOGY

## 2023-02-02 PROCEDURE — 128N000004 HC R&B CD ADULT

## 2023-02-02 PROCEDURE — 84443 ASSAY THYROID STIM HORMONE: CPT | Performed by: PSYCHIATRY & NEUROLOGY

## 2023-02-02 PROCEDURE — 82746 ASSAY OF FOLIC ACID SERUM: CPT | Performed by: PSYCHIATRY & NEUROLOGY

## 2023-02-02 RX ADMIN — NICOTINE 1 PATCH: 21 PATCH, EXTENDED RELEASE TRANSDERMAL at 08:01

## 2023-02-02 RX ADMIN — GABAPENTIN 300 MG: 300 CAPSULE ORAL at 08:01

## 2023-02-02 RX ADMIN — GABAPENTIN 300 MG: 300 CAPSULE ORAL at 13:28

## 2023-02-02 RX ADMIN — MIRTAZAPINE 15 MG: 15 TABLET, FILM COATED ORAL at 22:30

## 2023-02-02 RX ADMIN — MULTIPLE VITAMINS W/ MINERALS TAB 1 TABLET: TAB at 08:01

## 2023-02-02 RX ADMIN — DIAZEPAM 10 MG: 5 TABLET ORAL at 08:00

## 2023-02-02 RX ADMIN — FOLIC ACID 1 MG: 1 TABLET ORAL at 08:01

## 2023-02-02 RX ADMIN — GABAPENTIN 300 MG: 300 CAPSULE ORAL at 20:19

## 2023-02-02 RX ADMIN — PAROXETINE 30 MG: 10 TABLET, FILM COATED ORAL at 08:01

## 2023-02-02 RX ADMIN — TRAZODONE HYDROCHLORIDE 50 MG: 50 TABLET ORAL at 22:30

## 2023-02-02 RX ADMIN — THIAMINE HCL TAB 100 MG 100 MG: 100 TAB at 08:01

## 2023-02-02 ASSESSMENT — ACTIVITIES OF DAILY LIVING (ADL)
ADLS_ACUITY_SCORE: 30

## 2023-02-02 NOTE — PLAN OF CARE
Problem: Alcohol Withdrawal  Goal: Alcohol Withdrawal Symptom Control  Outcome: Progressing   Goal Outcome Evaluation:           Pt is in alcohol detox on [facility] protocol.  Pt scored 1 and 2. No valium given. Last valium 2/1 at 1658.  slept well.

## 2023-02-02 NOTE — PROGRESS NOTES
Pt BP elevated. Vital signs:  Temp: 97.7  F (36.5  C) Temp src: Temporal BP: (!) 176/106 Pulse: 73   Resp: 16 SpO2: 97 %    Pt MSSA 8; Pt indicated she wanted to take the valium and see if that helped before she tried hydralazine. Pt indicated she would check her blood pressure in an hour and if it was still high would consider hydralazine.

## 2023-02-02 NOTE — PROGRESS NOTES
Upon recheck of pt BP after administration of withdrawal medication, improvement noted. Vitals: Blood pressure 132/86, pulse 82, temperature 97.7  F (36.5  C). No further interventions needed at this time, will continue to monitor.

## 2023-02-02 NOTE — PLAN OF CARE
"Goal Outcome Evaluation:    Plan of Care Reviewed With: patient    Overall Patient Progress: improving    Patient continues in alcohol detox. No acute events this shift.    She has been visible in the milieu off and on throughout the evening.   She participated in group and attended the virtual AA meeting. She napped in between activities.    She ate 50% of her dinner. Appetite is improving, no GI complaints. She reports drinking plenty of fluids.    She denied SI/SIB/HI.    She denied pain.    1600 vitals: BP (!) 169/90 (BP Location: Left arm)   Pulse 68   Temp 97.5  F (36.4  C) (Temporal)   Resp 16   Ht 1.676 m (5' 6\")   Wt 76.8 kg (169 lb 5 oz)   SpO2 97%   BMI 27.33 kg/m      2000 vitals: BP (!) 157/92 (BP Location: Left arm)   Pulse 74   Temp 97.3  F (36.3  C) (Temporal)   Resp 16   Ht 1.676 m (5' 6\")   Wt 76.8 kg (169 lb 5 oz)   SpO2 97%   BMI 27.33 kg/m      MSSA: 8 & 4    PRNs administered this shift: valium 5 mg, trazodone 50 mg    "

## 2023-02-02 NOTE — PROGRESS NOTES
02/01/23 1800   Group Therapy Session   Group Attendance attended group session   Time Session Began 1645   Time Session Ended 1740   Total Time (minutes) 50   Total # Attendees 10   Group Type recreation   Group Topic Covered coping skills/lifestyle management   Group Session Detail healthy coping skills   Patient Response/Contribution cooperative with task   Patient Participation Detail Pt participated in Therapeutic Recreation group with focus on leisure education and acquisition of knowledge and skills. Pt was fully engaged and cooperative in group recreational intervention; leisure inventory. Pt was focused on the written portion for the first part of group and then contributed to the group discussion following. Pt discussed several recreational interests and positive coping skills that are healthy outlets. Pt mood was calm and was appropriate with interactions.

## 2023-02-02 NOTE — PLAN OF CARE
Behavioral Team Discussion: (2/2/2023)    Continued Stay Criteria/Rationale: Patient admitted for Alcohol  Use Disorder.  Plan: The following services will be provided to the patient; psychiatric assessment, medication management, therapeutic milieu, individual and group support, and skills groups.   Participants: 3A Provider: Dr Roselia Lopes MD; 3A RN: Barbara Gallardo RN; 3A CM's: Khloe Kraft.  Summary/Recommendation: Providers will assess today for treatment recommendations, discharge planning, and aftercare plans. CM will meet with pt for discharge planning.   Medical/Physical: Patient denies any medical or physical concerns at this time.   Precautions:   Behavioral Orders   Procedures     Code 1 - Restrict to Unit     Routine Programming     As clinically indicated     Seizure precautions     Status 15     Every 15 minutes.     Withdrawal precautions     Rationale for change in precautions or plan: N/A  Progress: Initial.    ASAM Dimension Scale Ratings:  Dimension 1: 3 Client tolerates and sb with withdrawal discomfort poorly. Client has severe intoxication, such that the client endangers self or others, or intoxication has not abated with less intensive levels of services. Client displays severe signs and symptoms; or risk of severe, but manageable withdrawal; or withdrawal worsening despite detox at less intensive level.  Dimension 2: 1 Client tolerates and sb with physical discomfort and is able to get the services that the client needs.  Dimension 3: 2 Client has difficulty with impulse control and lacks coping skills. Client has thoughts of suicide or harm to others without means; however, the thoughts may interfere with participation in some treatment activities. Client has difficulty functioning in significant life areas. Client has moderate symptoms of emotional, behavioral, or cognitive problems. Client is able to participate in most treatment activities.  Dimension 4: 3 Client displays  inconsistent compliance, minimal awareness of either the client's addiction or mental disorder, and is minimally cooperative.  Dimension 5: 3 Client has poor recognition and understanding of relapse and recidivism issues and displays moderately high vulnerability for further substance use or mental health problems. Client has few coping skills and rarely applies coping skills.  Dimension 6: 3 Client is not engaged in structured, meaningful activity and the client's peers, family, significant other, and living environment are unsupportive, or there is significant criminal justice system involvement.

## 2023-02-02 NOTE — PROGRESS NOTES
"[facility], [place]   Psychiatric Progress Note  Hospital Day: 1        Interim History:   The patient's care was discussed with the treatment team during the daily team meeting and/or staff's chart notes were reviewed.  Staff report patient has been present in the milieu. Pleasant and cooperative. Denied SI/HI/SIB. Has been given a total of 45 mg of Valium since arrival in ED. Patient did not report any acute medical concerns or side effects. No behavioral issues overnight, including violent or aggressive behaviors. Patient did not require seclusion or restraints. Patient is not exhibiting signs/sx of psychosis or graciela. Patient is medication adherent. Patient is attending groups. Patient is sleeping well. Patient is eating adequately. Patient is attending to ADLs. BP elevated this morning but improved following administration of Valium.    Upon interview, the patient reports that she is \"much better.\" She added that she is no longer having the \"jerky twitching and sweating. I don't feel so anxious or agitated.\" Denies changes in vision and headaches. Denies heart palpitations. Appetite has improved. She slept well with use of Trazodone. Feels that higher dose of Gabapentin has been helpful. Interested in retrialing naltrexone prior to discharge. Also interested in engaging in OP MICD programming.     Suicidal ideation: Denies suicidal thinking today. Last SI thought was \"when I couldn't get anyone to help me get here to detox.\"    Homicidal ideation: denies current or recent homicidal ideation or behaviors.    Psychotic symptoms: Patient denies AH, VH, paranoia, delusions.     Medication side effects reported: No significant side effects.    Acute medical concerns: none    Other issues reported by patient: Patient had no further questions or concerns.           Medications:       folic acid  1 mg Oral Daily     gabapentin  300 mg Oral TID     mirtazapine  15 mg Oral At Bedtime     " "multivitamin w/minerals  1 tablet Oral Daily     nicotine  1 patch Transdermal Daily     nicotine   Transdermal Q8H     PARoxetine  30 mg Oral Daily     thiamine  100 mg Oral Daily          Allergies:   No Known Allergies       Labs:     Recent Results (from the past 24 hour(s))   Drug abuse screen 1 urine (ED)    Collection Time: 02/01/23  8:54 AM   Result Value Ref Range    Amphetamines Urine Screen Negative Screen Negative    Barbiturates Urine Screen Negative Screen Negative    Benzodiazepines Urine Screen Negative Screen Negative    Cannabinoids Urine Screen Positive (A) Screen Negative    Cocaine Urine Screen Negative Screen Negative    Opiates Urine Screen Negative Screen Negative          Psychiatric Examination:     BP (!) 176/106   Pulse 73   Temp 97.7  F (36.5  C) (Temporal)   Resp 16   Ht 1.676 m (5' 6\")   Wt 76.8 kg (169 lb 5 oz)   SpO2 97%   BMI 27.33 kg/m    Weight is 169 lbs 5.01 oz  Body mass index is 27.33 kg/m .    Weight over time:  Vitals:    02/01/23 1141   Weight: 76.8 kg (169 lb 5 oz)       Orthostatic Vitals     None            Cardiometabolic risk assessment. 02/02/23      Reviewed patient profile for cardiometabolic risk factors    Date taken /Value  REFERENCE RANGE   Abdominal Obesity  (Waist Circumference)   See nursing flowsheet Women ?35 in (88 cm)   Men ?40 in (102 cm)      Triglycerides  Triglycerides   Date Value Ref Range Status   03/24/2018 247 (H) <150 mg/dL Final     Comment:     Borderline high:  150-199 mg/dl  High:             200-499 mg/dl  Very high:       >499 mg/dl         ?150 mg/dL (1.7 mmol/L) or current treatment for elevated triglycerides   HDL cholesterol  HDL Cholesterol   Date Value Ref Range Status   03/24/2018 30 (L) >49 mg/dL Final   ]   Women <50 mg/dL (1.3 mmol/L) in women or current treatment for low HDL cholesterol  Men <40 mg/dL (1 mmol/L) in men or current treatment for low HDL cholesterol     Fasting plasma glucose (FPG) Lab Results   Component " Value Date     02/01/2023     03/24/2018      FPG ?100 mg/dL (5.6 mmol/L) or treatment for elevated blood glucose   Blood pressure  BP Readings from Last 3 Encounters:   02/02/23 (!) 176/106   03/27/18 (!) 152/104    Blood pressure ?130/85 mmHg or treatment for elevated blood pressure   Family History  See family history     Appearance: awake, alert, adequately groomed, dressed in hospital scrubs, appeared as age stated and well groomed  Attitude:  cooperative  Eye Contact:  good  Mood:  better  Affect:  appropriate and in normal range  Speech:  clear, coherent  Language: fluent and intact in English  Psychomotor, Gait, Musculoskeletal:  no evidence of tardive dyskinesia, dystonia, or tics  Throught Process:  logical, linear and goal oriented  Associations:  no loose associations  Thought Content:  no evidence of suicidal ideation or homicidal ideation and no evidence of psychotic thought  Insight:  good  Judgement:  intact  Oriented to:  time, person, and place  Attention Span and Concentration:  intact  Recent and Remote Memory:  intact  Fund of Knowledge:  appropriate    Clinical Global Impressions  First:  Considering your total clinical experience with this particular patient population, how severe are the patient's symptoms at this time?: 7 (02/01/23 1929)  Compared to the patient's condition at the START of treatment, this patient's condition is: 4 (02/01/23 1929)  Most recent:  Considering your total clinical experience with this particular patient population, how severe are the patient's symptoms at this time?: 7 (02/01/23 1929)  Compared to the patient's condition at the START of treatment, this patient's condition is: 4 (02/01/23 1929)           Precautions:     Behavioral Orders   Procedures     Code 1 - Restrict to Unit     Routine Programming     As clinically indicated     Seizure precautions     Status 15     Every 15 minutes.     Withdrawal precautions          Diagnoses:     Alcohol  Use Disorder, severe, in withdrawal, complicated by history of DTs and possible seizure  Nicotine Use Disorder, moderate  Cannabis Use Disorder, severe  MDD, recurrent, severe without psychotic features  MASOOD  Panic Disorder with agorophobia  R/O Binge eating disorder  Historical diagnosis of avoidant personality disorder  Dyslipidemia  HTN         Assessment & Plan:     Assessment and hospital summary:  This patient is a 57 year old  female with history of Alcohol Use Disorder, MDD, Panic Disorder with agoraphobia, SAD, and avoidant personality disorder who presented to ED seeking detox from alcohol. Family history significant for depression on paternal side. Psychosocial stressors include: perceived lack of support from family and family conflict. Patient was admitted on a voluntary basis to [facility] detox. Patient was started on MSSA protocol using Valium. Pt  does have a history of DTs several years ago, and possibly had an unwitnessed seizure while at home a couple of weeks ago. Thiamine, folic acid, and multivitamin were ordered on admission. IM consult placed for co-management of care. Symptoms and presentation at this time is most consistent with Alcohol Use Disorder, severe, in withdrawal, complicated by history of DTs and hypokalemia. I have discussed the risks, benefits, and alternatives of PTA medication regimen, which will be restarted. Patient will likely benefit from increased MICD supports upon discharge. She is not interested in formal CD programming, but would like to have case management services. CTC will be meeting with patient to discuss dispo plan. Will consider anti-craving medications prior to discharge. Inpatient psychiatric hospitalization is warranted at this time for safety, stabilization, and possible adjustment in medications.    Psychiatric treatment/inteventions:  Alcohol Withdrawal:  - Continue MSSA protocol using Valium for management of alcohol withdrawal  - Continue  thiamine, folate, and multivitamin daily  - Consider anti-craving medications prior to discharge. Pt willing to review additional information about anti-craving medications prior to discharge.  - Continue prn Zofran as needed for nausea   - Withdrawal and seizure precautions in place      reviewed     Depression/Anxiety:  - Continue Paxil 30 mg daily. Pt has been taking intermittently PTA.  - Continue Remeron, but at lower dose of 15 mg at bedtime (instead of PTA dose of 30 mg at bedtime) as patient has been medication non-adherent for the past two weeks  - Resume Gabapentin but at higher dose of 300 mg TID (instead of PTA dose of 100 mg TID) to target acute anxiety and withdrawal symptoms  - Continue hydroxyzine 25 mg q4h prn for acute anxiety  - Continue Trazodone 50 mg at bedtime prn for sleep disturbances      Patient will be treated in therapeutic milieu with appropriate individual and group therapies as described.     Nicotine Use Disorder: Replacement available     Medical treatment/interventions:  Medical concerns: Alcohol withdrawal  Labs: Reviewed. Added GGT, HbA1c, lipid profile, Vitamin B12, folate, and TSH for AM blood draw and these were reviewed as well.   Consults: Routine IM consult placed. Appreciate assistance.  Per IM Note dated 2/1/23:  Steph Drew is a 57 year old female with history of depression and etoh abuse who was admitted to station 3A with etoh withdrawal     Alcohol dependency with acute withdrawal, depression: Drinking 1L vodka daily x1 week  - Management per psych      Elevated BP: BP 130s-190s/80s-110 in the setting of acute withdrawal. Treated with clonidine in the ED. No PTA meds or HTN diagnosis  - Hydralazine prn  - Medicine will continue to follow BP     Hypokalemia: K 3.1 in the ED. Treated with 20 mEq Kdur. Likely due to poor oral intake PTA.  - Give another 40 mEq Kdur  - Contact medicine if patient unable to tolerate PO intake in the next 24 hours     Medicine will  peripherally follow BP. Please contact if future questions or concerns arise. Thank you for the opportunity to be a part of this patient's care.     Sonia Childress PA-C     Legal Status: Voluntary     Safety Assessment:   Checks: Status 15  Pt has not required locked seclusion or restraints in the past 24 hours to maintain safety, please refer to RN documentation for further details.    The risks, benefits, alternatives and side effects have been discussed and are understood by the patient.    Disposition Plan   Reason for ongoing admission: requires detoxification from substance that poses a risk of bodily harm during withdrawal period  Discharge location: home with self-care. Although recommended, patient is currently declining CD programming  Discharge Medications: not ordered  Follow-up Appointments: not scheduled    Entered by: Roselia Lopes MD on 2/2/2023 at 8:49 AM

## 2023-02-02 NOTE — PLAN OF CARE
Problem: Alcohol Withdrawal  Goal: Alcohol Withdrawal Symptom Control  Outcome: Progressing     Behavioral  Pt guarded, pleasant and cooperative upon approach;  eating 75 % of meals and hydrating adequately;  Pt. Attending to ADL's appropriately; denied SI, SIB, HI, and hallucinations; no behavioral escalation or concerns noted this shift. Pt isolative to self. Pt present in the milieu for meals and medications; affect flat and blunted. Mood depressed.     Medical     No complaints of physical pain/discomfort this shift.     Pt hypertensive upon AM vital check 176/106. Pt indicated she would like to see if the valium worked to reduce BP before taking hydralazine. Upon recheck, after Valium administration, /86 & 105/73.    Pt continues in alcohol withdrawal; MSSA 8 and 5; pt has required 45 mg of valium since admission; pt last  required valium 2/2 @ 0800.     PRNS: Valium 10 mg for withdrawal.       Plan  Discharge plan is to go to [facility] at [facility].

## 2023-02-02 NOTE — DISCHARGE INSTRUCTIONS
Behavioral Discharge Planning and Instructions  THANK YOU FOR CHOOSING [facility]  3A  [phone]    Summary: You were admitted to [facility] 3A on 02/01 for detoxification from alcohol.  A medical exam was performed that included lab work. You have met with a  and opted to attending IOP at either [facility] [phone] or [facility] [phone].  Please take care and make your recovery a daily priority, Steph!  It was a pleasure working with you and the entire treatment team here wishes you the very best in your recovery!     Recommendation:  Attend IOP at either [facility] or [facility].     Main Diagnoses:  Per Dr. Lopes  Alcohol Use Disorder, severe, in withdrawal, complicated by history of DTs and possible seizure  Nicotine Use Disorder, moderate  Cannabis Use Disorder, severe    Major Treatments, Procedures and Findings:  You were treated for alcohol withdrawal detoxification using MSS protocol with valium. You completed a chemical dependency assessment. You had labs drawn and those results were reviewed with you. Please take a copy of your lab work with you to your next primary care provider appointment.    Symptoms to Report:  If you experience more anxiety, confusion, sleeplessness, deep sadness or thoughts of suicide, notify your treatment team or notify your primary care provider. IF ANY OF THE SYMPTOMS YOU ARE EXPERIENCING ARE A MEDICAL EMERGENCY CALL 911 IMMEDIATELY.     Lifestyle Adjustment: Adjust your lifestyle to get enough sleep, relaxation, exercise and good nutrition. Continue to develop healthy coping skills to decrease stress and promote a sober living environment. Do not use mood altering substances including alcohol, illegal drugs or addictive medications other than what is currently prescribed.     Disposition: Return Home    Facts about COVID19 at www.cdc.gov/COVID19 and www.[place].gov/covid19    Keeping hands clean is one of the most important steps we can take  to avoid getting sick and spreading germs to others.  Please wash your hands frequently and lather with soap for at least 20 seconds!    Follow-up Appointment:   PCP and Center    Primary Care Provider  Phone Evangeline     Ariella Bell [phone] [address] [address]      Please make a follow-up appointment with Primary Care Provider within one week of discharge.     Recovery apps for your phone to locate current in person and zoom recovery meetings  [employer] - meeting jhonny  AA  - meeting jhonny  Meeting guide - meeting jhonny  Quick NA meeting - meeting jhonny  [employer]- has various apps    Resources:  *due to covid-19 most AA/NA meetings are being held online however some are in-person or a hybrid combination please check online to verify*  Need Support Now? If you or someone you know is struggling or in crisis, help is available. Call or text [employer] or chat Expandly  AA meetings search for them at: https://aa-intergroup.org (worldwide meeting listings)  AA meetings for [place] area can be found online at: https://a[place].org (click local online meetings listings)  NA meetings for [place] area can be found online at: https://www.nam[place].org  (click find a meeting)  AA and NA Sponsors are excellent resources for support and you can find one at any support group meeting.   Alcoholics Anonymous (https://aa.org/): for information 24 hours/day  AA Intergroup service office in [place] (http://www.aastpaul.org/) 149.860.3709  AA Intergroup service office in [facility]: [phone]. (http://www.a[place].org/)  Narcotics Anonymous (www.nam[place].org) (319) 241-7425  https://aafairviewriverside.org/meetings  SMART Recovery - self management for addiction recovery:  www.[employer]elsrecovery.org  Pathways ~ A Health Crisis Resource & Support Center:  242.585.6829.  https://prescribetoprevent.org/patient-education/videos/  http://www.harmreduction.org  [facility] 741-426-7851  Support Group:  AA/NA and Sponsor/support.  National [place] on Mental Illness (www.[place].org): 287.376.5390 or 447-125-7401.  Alcoholics Anonymous (www.alcoholics-anonymous.org): Check your phone book for your local chapter.  Suicide Awareness Voices of Education (SAVE) (www.save.org): 403-150-JJMF (7965)  National Suicide Prevention Line (www.mentalHubskip[place].org): 346-023-HKTM (2709)  Mental Health Consumer/Survivor Network of [place] (www.mhcsn.net): 368.859.4531 or 495-890-5078  Mental Health Association of [place] (www.mentalhealth.org): 232.558.8311 or 432-488-2031   Substance Abuse and Mental Health Services (www.samhsa.gov)  [place] Opioid Prevention Coalition: www.opioidcoalition.org    [place] Recovery Connection (MRC)  [facility] connects people seeking recovery to resources that help foster and sustain long-term recovery.  Whether you are seeking resources for treatment, transportation, housing, job training, education, health care or other pathways to recovery, [facility] is a great place to start.  946.804.8613.  www.[place]fatutory.org    Great Pod casts for nutrition and wellness  Listen on Apple Podcasts  [employer]ishing [employer] Nutrition   [employer]   Nutrition       Understand the connection between what you eat and how you feel. Hosted by licensed nutritionists and dietitians from [employer] we share practical, real-life solutions for healthier living through nutrition.     General Medication Instructions:   See your medication sheet(s) for instructions.   Take all medications as prescribed.  Make no changes unless your primary care provider suggests them.   Go to all your primary care provider visits.  Be sure to have all your required lab tests. This way, your medicines can be refilled on time.  Do not use any forms of alcohol.    Please Note:  If you have any questions at anytime after you are discharged please call [facility] detox unit  3AW at [phone].  [facility], Behavioral Intake [phone]  Medical Records call [phone]  Outpatient Behavioral Intake call [phone]  LP+ Wait List/Bed Availability call [phone]    Please remember to take all of your behavioral discharge planning and lab paperwork to any follow up appointments, it contains your lab results, diagnosis, medication list and discharge recommendations.      THANK YOU FOR CHOOSING [facility]

## 2023-02-02 NOTE — PROGRESS NOTES
Writer met with patient to discuss after care plans and discharge planning. Patient stated that her psychiatrist recommended her to attend inpatient treatment however, patient refused. Patient stated that she would like to do some sort of IOP due to having to take care of her pets. Patient reported to writer she had attended the [facility] for MICD IOP treatment located close to her house. Patient is working on her assessment at this time.

## 2023-02-03 VITALS
BODY MASS INDEX: 27.21 KG/M2 | TEMPERATURE: 97.5 F | HEIGHT: 66 IN | RESPIRATION RATE: 18 BRPM | WEIGHT: 169.31 LBS | OXYGEN SATURATION: 99 % | SYSTOLIC BLOOD PRESSURE: 149 MMHG | HEART RATE: 68 BPM | DIASTOLIC BLOOD PRESSURE: 91 MMHG

## 2023-02-03 PROCEDURE — 99238 HOSP IP/OBS DSCHRG MGMT 30/<: CPT | Performed by: PSYCHIATRY & NEUROLOGY

## 2023-02-03 PROCEDURE — 250N000013 HC RX MED GY IP 250 OP 250 PS 637: Performed by: PSYCHIATRY & NEUROLOGY

## 2023-02-03 RX ORDER — NALTREXONE HYDROCHLORIDE 50 MG/1
50 TABLET, FILM COATED ORAL DAILY
Status: DISCONTINUED | OUTPATIENT
Start: 2023-02-03 | End: 2023-02-03 | Stop reason: HOSPADM

## 2023-02-03 RX ORDER — NALTREXONE HYDROCHLORIDE 50 MG/1
50 TABLET, FILM COATED ORAL DAILY
Qty: 30 TABLET | Refills: 0 | Status: ON HOLD | OUTPATIENT
Start: 2023-02-03 | End: 2023-06-13

## 2023-02-03 RX ORDER — PAROXETINE 30 MG/1
30 TABLET, FILM COATED ORAL DAILY
Qty: 30 TABLET | Refills: 0 | Status: ON HOLD | OUTPATIENT
Start: 2023-02-03 | End: 2023-06-13

## 2023-02-03 RX ORDER — FOLIC ACID 1 MG/1
1 TABLET ORAL DAILY
Qty: 30 TABLET | Refills: 0 | Status: ON HOLD | OUTPATIENT
Start: 2023-02-03 | End: 2023-06-13

## 2023-02-03 RX ORDER — GABAPENTIN 100 MG/1
100-300 CAPSULE ORAL 3 TIMES DAILY
Qty: 270 CAPSULE | Refills: 0 | Status: ON HOLD | OUTPATIENT
Start: 2023-02-03 | End: 2023-06-13

## 2023-02-03 RX ORDER — LANOLIN ALCOHOL/MO/W.PET/CERES
100 CREAM (GRAM) TOPICAL DAILY
Qty: 30 TABLET | Refills: 0 | Status: ON HOLD | OUTPATIENT
Start: 2023-02-03 | End: 2023-06-13

## 2023-02-03 RX ORDER — MIRTAZAPINE 15 MG/1
15 TABLET, FILM COATED ORAL AT BEDTIME
Qty: 30 TABLET | Refills: 0 | Status: ON HOLD
Start: 2023-02-03 | End: 2023-06-13

## 2023-02-03 RX ORDER — NICOTINE 21 MG/24HR
1 PATCH, TRANSDERMAL 24 HOURS TRANSDERMAL DAILY
Qty: 30 PATCH | Refills: 3 | Status: ON HOLD | OUTPATIENT
Start: 2023-02-03 | End: 2023-06-13

## 2023-02-03 RX ORDER — TRAZODONE HYDROCHLORIDE 50 MG/1
50 TABLET, FILM COATED ORAL
Qty: 10 TABLET | Refills: 0 | Status: ON HOLD | OUTPATIENT
Start: 2023-02-03 | End: 2023-06-13

## 2023-02-03 RX ADMIN — NICOTINE 1 PATCH: 21 PATCH, EXTENDED RELEASE TRANSDERMAL at 08:52

## 2023-02-03 RX ADMIN — MULTIPLE VITAMINS W/ MINERALS TAB 1 TABLET: TAB at 08:53

## 2023-02-03 RX ADMIN — NALTREXONE HYDROCHLORIDE 50 MG: 50 TABLET, FILM COATED ORAL at 08:53

## 2023-02-03 RX ADMIN — THIAMINE HCL TAB 100 MG 100 MG: 100 TAB at 08:54

## 2023-02-03 RX ADMIN — FOLIC ACID 1 MG: 1 TABLET ORAL at 08:53

## 2023-02-03 RX ADMIN — GABAPENTIN 300 MG: 300 CAPSULE ORAL at 08:53

## 2023-02-03 RX ADMIN — PAROXETINE 30 MG: 10 TABLET, FILM COATED ORAL at 08:53

## 2023-02-03 ASSESSMENT — ACTIVITIES OF DAILY LIVING (ADL)
ADLS_ACUITY_SCORE: 30
LAUNDRY: WITH SUPERVISION
ADLS_ACUITY_SCORE: 30
DRESS: INDEPENDENT
HYGIENE/GROOMING: INDEPENDENT
ORAL_HYGIENE: INDEPENDENT
ADLS_ACUITY_SCORE: 30

## 2023-02-03 ASSESSMENT — ANXIETY QUESTIONNAIRES
7. FEELING AFRAID AS IF SOMETHING AWFUL MIGHT HAPPEN: NEARLY EVERY DAY
3. WORRYING TOO MUCH ABOUT DIFFERENT THINGS: NEARLY EVERY DAY
GAD7 TOTAL SCORE: 17
GAD7 TOTAL SCORE: 17
IF YOU CHECKED OFF ANY PROBLEMS ON THIS QUESTIONNAIRE, HOW DIFFICULT HAVE THESE PROBLEMS MADE IT FOR YOU TO DO YOUR WORK, TAKE CARE OF THINGS AT HOME, OR GET ALONG WITH OTHER PEOPLE: EXTREMELY DIFFICULT
1. FEELING NERVOUS, ANXIOUS, OR ON EDGE: NEARLY EVERY DAY
4. TROUBLE RELAXING: NEARLY EVERY DAY
6. BECOMING EASILY ANNOYED OR IRRITABLE: SEVERAL DAYS
2. NOT BEING ABLE TO STOP OR CONTROL WORRYING: NEARLY EVERY DAY
5. BEING SO RESTLESS THAT IT IS HARD TO SIT STILL: SEVERAL DAYS

## 2023-02-03 ASSESSMENT — PATIENT HEALTH QUESTIONNAIRE - PHQ9: SUM OF ALL RESPONSES TO PHQ QUESTIONS 1-9: 16

## 2023-02-03 NOTE — PROGRESS NOTES
Patient discharged at 1335 to home. Patient reports she will be picked up by her Mother. All patient belongings from room, locker, and security sent with patient. Patient escorted off the unit by Psych Associate, Luis Fernando.     This RN went over discharge instructions, teachings, patient's labs, and unit recommendations with patient. This RN went over patient's prescribed medications being sent home with patient from the discharge pharmacy. Patient verbalizes and demonstrates understanding of all teachings. Patient verbalizes understanding of medication instructions and indications. Patient denies thoughts of harm towards self or others. Patient denies suicidal ideation, plan or intent. Patient denies access to guns. Patient denies auditory/visual hallucinations. Pt denies any current medication side effects or medical issues at this time.     Patient denies any further questions and is now discharged at this time.

## 2023-02-03 NOTE — DISCHARGE SUMMARY
"Psychiatric Discharge Summary    Steph Drew MRN# [id number]   Age: 57 year old YOB: 1965     Date of Admission:  2/1/2023  Date of Discharge:  2/3/2023  Admitting Physician:  Roselia Lopes MD  Discharge Physician:  Roselia Lopes MD (Contact: [phone])         Event Leading to Hospitalization:   Per H&P:    Per ED Provider Note dated 2/1/23:     Steph Drew is a 57 year old female who has a PMHx of etoh detox presenting with request for detox. She has drank 1L of vodka per day for the past week. Also uses marijuana.  She denies any thoughts of hurting her self or others.  No hallucinations or voices.  The patient denies abdominal pain, nausea, vomiting or recent illnesses.  Denies any medical problems.  Last drink was at about midnight overnight today.     MDM  Patient presenting with request for detox.  There is a bed available that was reserved.  We will get labs and anticipate admission to detox.  She will be placed on the [facility] protocol and signed out to oncoming provider.     I have reviewed the nursing notes. I have reviewed the findings, diagnosis, plan and need for follow up with the patient.     Per my interview with patient:     Onset of alcohol use in highschool. Became problematic in college years.  Alcohol quantity: 1 L daily of vodka  Alcohol use duration: several months  Longest period of sobriety was: 4.5 years up until September, 2022.  Relapse: Patient relapsed shortly after she had conflict with her parents. She said \"I had to set boundaries with my dad, and I couldn't handle my dad falling out of my life. Everything went to shit. My friend moved back to [place] too. I got angry and lashed out at people and then I say mean things. I had no one to help me and my psychologist dropped me so I was just lost, and very lonely.\"     Estimated number of detoxes: 10   History of CD treatment: >10  BAL in ED:not obtained  Urine drug screen: Positive for cannabinoids, " "otherwise negative  Anti-craving medications: Has never tried Antabuse or Campral. Hasn't started naltrexone but her psychiatrist has prescribed it.      Patient has tolerance, withdrawal, progressive use, loss of control, spending more time and more amount than intended. Patient has made attempts to quit, is experiencing cravings, and reports negative consequences.  Patient does not turner.     Patient does possibly have a history of seizures per her report. She said that a couple of weeks ago, she woke up she couldn't move or think and then gradually went on the ground. Denies loss of bowel or bladder. No bite marks on tongue. Denies extended period of confusion or head injuries.      Patient does have a history of delirium tremens several years ago.     Patient does not have a history of overdose.  Patient does not have a history of IV use.  Patient does not have a history of HIV, Hep B or C.     For MH: Patient reports feeling depressed since September when she relapsed on alcohol. Since that time, she has experienced passive, intermittent SI where she has thoughts that she would be better off dead. She added that she has trouble  self hatred from the suicidal thinking. She feels hopeless. Denies SI intent or plan. Future oriented, expressing desire to \"get my physical health back.\" Stated that she does currently have a desire to be alive, and to get well. Denies access to firearms. Has been on several various antidepressants, but notes that she was stable on current medication regimen when sober. Has only been taking Paxil intermittently in the past month, and has not taken Remeron in about two weeks. She noted that she cannot take Remeron while consuming alcohol due to sedation.        See Admission note by Roselia Lopes MD on 2/1/23 for additional details.          Diagnoses:     Alcohol Use Disorder, severe, withdrawal resolved, complicated by history of DTs and possible seizure  Nicotine " Use Disorder, moderate  Cannabis Use Disorder, severe  MDD, recurrent, severe without psychotic features  MASOOD  Panic Disorder with agorophobia  R/O Binge eating disorder  Historical diagnosis of avoidant personality disorder  Dyslipidemia  HTN         Labs:     Recent Results (from the past 168 hour(s))   Asymptomatic COVID-19 Virus (Coronavirus) by PCR Nasopharyngeal    Collection Time: 02/01/23  6:33 AM    Specimen: Nasopharyngeal; Swab   Result Value Ref Range    SARS CoV2 PCR Negative Negative   Comprehensive metabolic panel    Collection Time: 02/01/23  6:46 AM   Result Value Ref Range    Sodium 138 133 - 144 mmol/L    Potassium 3.1 (L) 3.4 - 5.3 mmol/L    Chloride 106 94 - 109 mmol/L    Carbon Dioxide (CO2) 23 20 - 32 mmol/L    Anion Gap 9 3 - 14 mmol/L    Urea Nitrogen 10 7 - 30 mg/dL    Creatinine 0.65 0.52 - 1.04 mg/dL    Calcium 9.2 8.5 - 10.1 mg/dL    Glucose 118 (H) 70 - 99 mg/dL    Alkaline Phosphatase 93 40 - 150 U/L    AST 27 0 - 45 U/L    ALT 26 0 - 50 U/L    Protein Total 7.9 6.8 - 8.8 g/dL    Albumin 3.9 3.4 - 5.0 g/dL    Bilirubin Total 0.5 0.2 - 1.3 mg/dL    GFR Estimate >90 >60 mL/min/1.73m2   CBC with platelets and differential    Collection Time: 02/01/23  6:46 AM   Result Value Ref Range    WBC Count 7.0 4.0 - 11.0 10e3/uL    RBC Count 4.61 3.80 - 5.20 10e6/uL    Hemoglobin 14.2 11.7 - 15.7 g/dL    Hematocrit 40.6 35.0 - 47.0 %    MCV 88 78 - 100 fL    MCH 30.8 26.5 - 33.0 pg    MCHC 35.0 31.5 - 36.5 g/dL    RDW 12.7 10.0 - 15.0 %    Platelet Count 284 150 - 450 10e3/uL    % Neutrophils 57 %    % Lymphocytes 33 %    % Monocytes 8 %    % Eosinophils 1 %    % Basophils 1 %    % Immature Granulocytes 0 %    NRBCs per 100 WBC 0 <1 /100    Absolute Neutrophils 4.0 1.6 - 8.3 10e3/uL    Absolute Lymphocytes 2.3 0.8 - 5.3 10e3/uL    Absolute Monocytes 0.5 0.0 - 1.3 10e3/uL    Absolute Eosinophils 0.1 0.0 - 0.7 10e3/uL    Absolute Basophils 0.1 0.0 - 0.2 10e3/uL    Absolute Immature Granulocytes 0.0  <=0.4 10e3/uL    Absolute NRBCs 0.0 10e3/uL   Drug abuse screen 1 urine (ED)    Collection Time: 02/01/23  8:54 AM   Result Value Ref Range    Amphetamines Urine Screen Negative Screen Negative    Barbiturates Urine Screen Negative Screen Negative    Benzodiazepines Urine Screen Negative Screen Negative    Cannabinoids Urine Screen Positive (A) Screen Negative    Cocaine Urine Screen Negative Screen Negative    Opiates Urine Screen Negative Screen Negative   GGT    Collection Time: 02/02/23  7:45 AM   Result Value Ref Range    GGT 30 0 - 40 U/L   Hemoglobin A1c    Collection Time: 02/02/23  7:45 AM   Result Value Ref Range    Hemoglobin A1C 4.8 0.0 - 5.6 %   Lipid panel    Collection Time: 02/02/23  7:45 AM   Result Value Ref Range    Cholesterol 230 (H) <200 mg/dL    Triglycerides 224 (H) <150 mg/dL    Direct Measure HDL 71 >=50 mg/dL    LDL Cholesterol Calculated 114 (H) <=100 mg/dL    Non HDL Cholesterol 159 (H) <130 mg/dL   TSH with free T4 reflex and/or T3 as indicated    Collection Time: 02/02/23  7:45 AM   Result Value Ref Range    TSH 3.62 0.40 - 4.00 mU/L   Vitamin B12    Collection Time: 02/02/23  7:45 AM   Result Value Ref Range    Vitamin B12 1,065 232 - 1,245 pg/mL   Folate    Collection Time: 02/02/23  7:45 AM   Result Value Ref Range    Folic Acid 13.7 4.6 - 34.8 ng/mL   Potassium    Collection Time: 02/02/23  7:45 AM   Result Value Ref Range    Potassium 3.8 3.4 - 5.3 mmol/L          Consults:   Consultation during this admission received from internal medicine on 2/1/23:    Steph Drew is a 57 year old female with history of depression and etoh abuse who was admitted to station 3A with etoh withdrawal     Alcohol dependency with acute withdrawal, depression: Drinking 1L vodka daily x1 week  - Management per psych      Elevated BP: BP 130s-190s/80s-110 in the setting of acute withdrawal. Treated with clonidine in the ED. No PTA meds or HTN diagnosis  - Hydralazine prn  - Medicine will continue to  follow BP     Hypokalemia: K 3.1 in the ED. Treated with 20 mEq Kdur. Likely due to poor oral intake PTA.  - Give another 40 mEq Kdur  - Contact medicine if patient unable to tolerate PO intake in the next 24 hours     Medicine will peripherally follow BP. Please contact if future questions or concerns arise. Thank you for the opportunity to be a part of this patient's care.        Sonia Childress PA-C    -------------------------------------------------    NOTE: K repeated on 2/2 and normalized. Lipid profile was abnormal. Reviewed with patient on 2/3. Encouraged healthy eating habits and regular exercise. Also requested that she follow up with PCP in one week to discuss pharmacologic interventions and to ensure BP is improving.          Hospital Course:   Patient was admitted to Station 3[facility] with attending Roselia Lopes MD as a voluntary patient. The patient was placed under status 15 (15 minute checks) to ensure patient safety.     MSSA protocol was initiated due to the patient's history of alcohol abuse and concern for withdrawal symptoms.  Over the course of hospitalization, patient was treated with Valium to manage withdrawal. Last dose of Valium was given on 2/2/23. She completed detox on 2/3/23. She required a TOTAL of 45mg of Valium since arrival in ED. She has not experienced any significant symptoms of withdrawal since last dose of Valium given. She experienced no complications associated with withdrawal. Hydroxyzine and Trazodone were utilized prn for management of anxiety and sleep, respectively. Patient was treated with multivitamin, thiamine, and folic acid daily. She was evaluated by IM (see above). She was medically cleared at time of discharge. Anti-craving medications were discussed, and naltresone was initiated after R/B/A were reviewed with patient. She tolerated medications well without reported side effects.     To target depressive and anxiety symptoms, PTA medications were  restarted. Pt was only taking Paxil intermittently and stopped taking Remeron because of adverse side effects (sedation) when taking while consuming alcohol. Gabapentin was increased from 100 mg TID to 300 mg TID with noted improvement in anxiety. However, she did report mild day-time sedation which may have been a side effect from Valium vs Gabapentin. She was instructed to continue current dose for now, but if sedation persists over the next few days, she was asked to reduce the dose.     Per CTC note dated 2/2/23 and 2/3/23, treatment plan includes:     Writer met with patient to discuss after care plans and discharge planning. Patient stated that her psychiatrist recommended her to attend inpatient treatment however, patient refused. Patient stated that she would like to do some sort of IOP due to having to take care of her pets. Patient reported to writer she had attended the [facility] for MICD IOP treatment located close to her house. Patient is working on her assessment at this time.    Writer completed assessment with patient and [facility]     Patient denied alcohol cravings at time of discharge. She understands risks associated with relapse. Appears to be motivated to maintain sobriety upon discharge.      She did participate in groups and was visible in the milieu.     The patient's symptoms of withdrawal fully resolved.     Patient was released to home with referrals for OP MICD programming in place. She was offered door to door residential treatment, though declined. At the time of discharge, patient was determined to not be a danger to himself or others. She consistently denied SI/HI, and remained future oriented. Denied access to firearms.     Because this patient meets criteria for an Alcohol Use Disorder, I performed the following brief intervention on the date of this note:              1) Expressed concern that the patient is  drinking at unhealthy levels known to increase their risk of alcohol related problems              2) Gave feedback linking alcohol use and health, including personalized feedback explaining how alcohol use can interact with their medical and/or psychiatric problems, and with prescribed medications.              3) Advised patient to abstain.         Discharge Medications:     Current Discharge Medication List      START taking these medications    Details   folic acid (FOLVITE) 1 MG tablet Take 1 tablet (1 mg) by mouth daily  Qty: 30 tablet, Refills: 0    Associated Diagnoses: Alcohol dependence, continuous (H)      naltrexone (DEPADE/REVIA) 50 MG tablet Take 1 tablet (50 mg) by mouth daily  Qty: 30 tablet, Refills: 0    Associated Diagnoses: Severe episode of recurrent major depressive disorder, without psychotic features (H)      nicotine (NICODERM CQ) 21 MG/24HR 24 hr patch Place 1 patch onto the skin daily  Qty: 30 patch, Refills: 3    Associated Diagnoses: Nicotine dependence with withdrawal, unspecified nicotine product type      thiamine (B-1) 100 MG tablet Take 1 tablet (100 mg) by mouth daily  Qty: 30 tablet, Refills: 0    Associated Diagnoses: Alcohol dependence, continuous (H)      traZODone (DESYREL) 50 MG tablet Take 1 tablet (50 mg) by mouth nightly as needed for sleep  Qty: 10 tablet, Refills: 0    Associated Diagnoses: Severe episode of recurrent major depressive disorder, without psychotic features (H)         CONTINUE these medications which have CHANGED    Details   gabapentin (NEURONTIN) 100 MG capsule Take 1-3 capsules (100-300 mg) by mouth 3 times daily  Qty: 270 capsule, Refills: 0    Associated Diagnoses: Alcohol dependence, continuous (H)      mirtazapine (REMERON) 15 MG tablet Take 1 tablet (15 mg) by mouth At Bedtime  Qty: 30 tablet, Refills: 0    Associated Diagnoses: Severe episode of recurrent major depressive disorder, without psychotic features (H)      nicotine polacrilex  (NICORETTE) 4 MG gum Place 1-2 each (4-8 mg) inside cheek every hour as needed for other (nicotine withdrawal symptoms)  Qty: 270 each, Refills: 3    Associated Diagnoses: Uncomplicated alcohol dependence (H)      PARoxetine (PAXIL) 30 MG tablet Take 1 tablet (30 mg) by mouth daily  Qty: 30 tablet, Refills: 0    Associated Diagnoses: Uncomplicated alcohol dependence (H)         CONTINUE these medications which have NOT CHANGED    Details   hydrOXYzine (ATARAX) 25 MG tablet Take 25-50 mg by mouth every 4 hours as needed for anxiety      multivitamin, therapeutic with minerals (THERA-VIT-M) TABS tablet Take 1 tablet by mouth daily  Qty: 30 each, Refills: 0    Associated Diagnoses: Uncomplicated alcohol dependence (H)                  Psychiatric Examination:   Appearance:  awake, alert and adequately groomed  Attitude:  cooperative  Eye Contact:  good  Mood:  better  Affect:  appropriate and in normal range  Speech:  clear, coherent  Psychomotor Behavior:  no evidence of tardive dyskinesia, dystonia, or tics  Thought Process:  logical, linear and goal oriented  Associations:  no loose associations  Thought Content:  no evidence of suicidal ideation or homicidal ideation and no evidence of psychotic thought  Insight:  good  Judgment:  intact  Oriented to:  time, person, and place  Attention Span and Concentration:  intact  Recent and Remote Memory:  intact  Language: Able to name objects, Able to repeat phrases and Able to read and write  Fund of Knowledge: appropriate  Muscle Strength and Tone: normal  Gait and Station: Normal         Discharge Plan:   Summary: You were admitted to Station 3A on 02/01 for detoxification from alcohol.  A medical exam was performed that included lab work. You have met with a  and opted to attending IOP at either [facility] [phone] or [facility] [phone].  Please take care and make your recovery a daily priority, Steph!  It was a pleasure working with you and  the entire treatment team here wishes you the very best in your recovery!      Recommendation:  Attend IOP at either [facility] or [facility].      Main Diagnoses:  Per Dr. Lopes  Alcohol Use Disorder, severe, in withdrawal, complicated by history of DTs and possible seizure  Nicotine Use Disorder, moderate  Cannabis Use Disorder, severe     Major Treatments, Procedures and Findings:  You were treated for alcohol withdrawal detoxification using MSSA protocol with valium. You completed a chemical dependency assessment. You had labs drawn and those results were reviewed with you. Please take a copy of your lab work with you to your next primary care provider appointment.     Symptoms to Report:  If you experience more anxiety, confusion, sleeplessness, deep sadness or thoughts of suicide, notify your treatment team or notify your primary care provider. IF ANY OF THE SYMPTOMS YOU ARE EXPERIENCING ARE A MEDICAL EMERGENCY CALL 911 IMMEDIATELY.      Lifestyle Adjustment: Adjust your lifestyle to get enough sleep, relaxation, exercise and good nutrition. Continue to develop healthy coping skills to decrease stress and promote a sober living environment. Do not use mood altering substances including alcohol, illegal drugs or addictive medications other than what is currently prescribed.      Disposition: Return Home     Facts about COVID19 at www.cdc.gov/COVID19 and www.[place].gov/covid19     Keeping hands clean is one of the most important steps we can take to avoid getting sick and spreading germs to others.  Please wash your hands frequently and lather with soap for at least 20 seconds!     Follow-up Appointment:   PCP and Center    Primary Care Provider  Phone [place]     Ariella Bell [phone] [address] [facility] [address]      Please make a follow-up appointment with Primary Care Provider within one week of discharge.      Recovery apps for your phone to locate current in person  and zoom recovery meetings  [place] - meeting jhonny  AA  - meeting jhonny  Meeting guide - meeting jhonny  Quick NA meeting - meeting jhonny  [place]- has various apps     Resources:  *due to covid-19 most AA/NA meetings are being held online however some are in-person or a hybrid combination please check online to verify*  Need Support Now? If you or someone you know is struggling or in crisis, help is available. Call or text 635 or chat [employer]  AA meetings search for them at: https://aa-intergroup.org (worldwide meeting listings)  AA meetings for [place] area can be found online at: https://a[place].org (click local online meetings listings)  NA meetings for [place] area can be found online at: https://www.nam[place].org  (click find a meeting)  AA and NA Sponsors are excellent resources for support and you can find one at any support group meeting.   Alcoholics Anonymous (https://aa.org/): for information 24 hours/day  AA Intergroup service office in [place] (http://www.aastpaul.org/) 762.241.1616  AA Intergroup service office in [facility]: [phone]. (http://www.aaminneaRVE.[employer].org/)  Narcotics Anonymous (www.nam[place].org) (122) 620-4855  https://aafairviewriverside.org/meetings  SMART Recovery - self management for addiction recovery:  www.smartrecovery.org  Pathways ~ A Health Crisis Resource & Support Center:  202.132.4737.  https://prescribetoprevent.org/patient-education/videos/  http://www.harmreduction.org  [facility] 800-759-4200  Support Group:  AA/NA and Sponsor/support.  National [place] on Mental Illness (www.[place].org): 340.898.3384 or 770-545-7943.  Alcoholics Anonymous (www.alcoholics-anonymous.org): Check your phone book for your local chapter.  Suicide Awareness Voices of Education (SAVE) (www.save.org): 350-831-KHPI (1108)  National Suicide Prevention Line (www.mentalhealthmn.org): 078-492-TZYF (2597)  Mental Health Consumer/Survivor Network of [place]  (www.[facility].net): 393-240-5520 or 003-367-9684  Mental Health Association of [place] (www.mentalhealth.org): 253.744.3117 or 269-348-6693   Substance Abuse and Mental Health Services (www.samhsa.gov)  [place] Opioid Prevention Coalition: www.opioidcoalition.org     [place] Recovery Connection (MRC)  [facility] connects people seeking recovery to resources that help foster and sustain long-term recovery.  Whether you are seeking resources for treatment, transportation, housing, job training, education, health care or other pathways to recovery, [facility] is a great place to start.  200.659.8874.  www.[place].org     Great Pod casts for nutrition and wellness  Listen on Apple Podcasts  Dishing Up Nutrition   [employer]   Nutrition       Understand the connection between what you eat and how you feel. Hosted by licensed nutritionists and dietitians from [employer] we share practical, real-life solutions for healthier living through nutrition.      General Medication Instructions:   See your medication sheet(s) for instructions.   Take all medications as prescribed.  Make no changes unless your primary care provider suggests them.   Go to all your primary care provider visits.  Be sure to have all your required lab tests. This way, your medicines can be refilled on time.  Do not use any forms of alcohol.     Please Note:  If you have any questions at anytime after you are discharged please call [facility] detox unit 3AW at [phone].  [facility], Behavioral Intake [phone]  Medical Records call [phone]  Outpatient Behavioral Intake call [phone]  LP+ Wait List/Bed Availability call [phone]    Please remember to take all of your behavioral discharge planning and lab paperwork to any follow up appointments, it contains your lab results, diagnosis, medication list and discharge recommendations.       THANK YOU FOR CHOOSING [facility]          Attestation:  The patient has been seen and evaluated by me,  Roselia Lopes MD

## 2023-02-03 NOTE — PROGRESS NOTES
S: Patient scored less than 8 for greater than 24 hours. Pt has required no medication for withdrawal for > 24 hours,.  B: Patient admitted for alcohol withdrawal and detoxification.  A: Patient stable in the alcohol withdrawal process AEB MSSA scores < 8 for > 24 hours.  R: Patient removed from detox status per unit Protocol.    Patient anticipated to discharge today.

## 2023-02-03 NOTE — PROGRESS NOTES
"  Unit 3A    UNIVERSAL ADULT DIAGNOSTIC ASSESSMENT - Substance Use Disorder    Provider Name and Credentials: Khloe Kraft MS, [facility]    PATIENT'S NAME: Steph Drew  PREFERRED NAME: Steph  PRONOUNS:       MRN: [id number]  : 1965   Last 4 SSN: [id number]  ACCT. NUMBER:  [id number]  DATE OF SERVICE: 2/3/2023   START TIME: 8:00am  END TIME: 9:00am  PREFERRED PHONE: [phone]   EMAIL: [email or web address]  May we leave a program related message: Yes  SERVICE MODALITY:  In-person      Identifying Information:  Patient is a 57 year old,  female who was referred for an assessment by self. The pronoun use throughout this assessment reflects the patient's chosen pronoun. Patient attended the session alone.     Chief Complaint:   The reason for seeking services at this time is: \"alcohol withdrawal\"  The problem(s) began at the age of 25-30 years old. Patient has attempted to resolve these concerns in the past through came to detox at the age of 20 which is 25 years ago. Also attended [facility].  Patient does not appear to be in severe withdrawal, an imminent safety risk to self or others, or requiring immediate medical attention and may proceed with the assessment interview.    Social/Family History:  Patient reported she grew up in [place]. Patient was raised by mother and father. Patient reported that her childhood was safe, parents had no time for patient.  Patient describes current relationships with family of origin as strained.      The patient describes her cultural background as white, Japanese.  Cultural influences and impact on patient's life structure, values, norms, and healthcare: Patient denies.  Contextual influences on patient's health include: Contextual Factors: Individual Factors M/CD issues .  Patient identified her preferred language to be English. Patient reported she does not need the assistance of an  or other support involved in therapy.     Patient reported had " "no significant delays in developmental tasks.  Patient's highest education level was college graduate. Patient identified the following learning problems: none reported.  Patient reports she is able to understand written materials.    Patient's current relationship status is single for 30 years.   Patient identified her sexual orientation as straight.  Patient reported having zero child(perla).     Patient's current living/housing situation involves staying in own home/apartment.  Patient lives with alone and she reports that housing is stable. Patient identified mother and siblings as part of her support system.  Patient identified the quality of these relationships as stable and meaningful.      Patient reports she is not involved in [employer] activities. Patients reports spirituality impacts her recovery in the following ways:  Patient reports \"so important\".     Patient reports engaging in the following recreational/leisure activities: walking the garden and greeting neighbors. Patient reports engaging in the following recreation/leisure activities while using: isolating and sometimes driving. Patient reports the following people are supportive of recovery: parents and brother.  Patient is currently unemployed.  Patient reports her income is obtained through disability.  Patient does identify finances as a current stressor. Cultural and socioeconomic factors do not affect the patient's access to services.    Patient denies substance related arrests or legal issues.  Patient denies being on probation / parole / under the jurisdiction of the court.    Patient's Strengths and Limitations:  Patient identified the following strengths or resources that will help her succeed in treatment: spirituality and other people. Things that may interfere with the patient's success in treatment include: to much stress and anxiety. .     Assessments:  The following assessments were completed by patient for this " visit:  PHQ9:   PHQ-9 SCORE 2/3/2023   PHQ-9 Total Score 16     GAD7:   MASOOD-7 SCORE 2/3/2023   Total Score 17     PROMIS 10-Global Health (all questions and answers displayed):   PROMIS 10 2/3/2023   In general, would you say your health is: 2   In general, would you say your quality of life is: 3   In general, how would you rate your physical health? 3   In general, how would you rate your mental health, including your mood and your ability to think? 1   In general, how would you rate your satisfaction with your social activities and relationships? 1   In general, please rate how well you carry out your usual social activities and roles. (This includes activities at home, at work and in your community, and responsibilities as a parent, child, spouse, employee, friend, etc.) 3   To what extent are you able to carry out your everyday physical activities such as walking, climbing stairs, carrying groceries, or moving a chair? 5   In the past 7 days, how often have you been bothered by emotional problems such as feeling anxious, depressed, or irritable? 2   In the past 7 days, how would you rate your fatigue on average? 3   In the past 7 days, how would you rate your pain on average, where 0 means no pain, and 10 means worst imaginable pain? 0   Global Mental Health Score 9   Global Physical Health Score 16   PROMIS TOTAL - SUBSCORES 25   Some recent data might be hidden     GAIN-sliding scale:  When was the last time that you had significant problems... 2/3/2023   with feeling very trapped, lonely, sad, blue, depressed or hopeless about the future? Past month   with sleep trouble, such as bad dreams, sleeping restlessly, or falling asleep during the day? Past Month   with feeling very anxious, nervous, tense, scared, panicked or like something bad was going to happen? Past month   with becoming very distressed & upset when something reminded you of the past? Past month      When was the last time that you did the  following things 2 or more times? 2/3/2023   Lied or conned to get things you wanted or to avoid having to do something? Past month   Had a hard time paying attention at school, work or home? Past month   Had a hard time listening to instructions at school, work or home? Past month   Were a bully or threatened other people? Never   Started physical fights with other people? Never       Personal and Family Medical History:   Patient did report a family history of mental health concerns.  Patient reports the following family history: father depression.  Family History   Problem Relation Age of Onset     Family History Negative Mother         Good Health     Family History Negative Father         Good Health     Family History Negative Sister         Good Health     Family History Negative Brother         Good Health     Family History Negative Brother         Good Health     Family History Negative Brother         Good Health        Patient reported the following previous mental health diagnoses: anxiety, depression, panic disorder.  Patient reports her primary mental health symptoms include: isolation, being afraid to leave the house, afraid to do anything  and these do impact her ability to function.   Patient has received mental health services in the past: therapy, psychiatrist.  Psychiatric Hospitalizations: Patient reports once in [facility] and 2 other times in [place] could not remember the exact dates. .  Patient denies a history of civil commitment.  Current mental health services/providers include:  [facility] .    Patient has not had a physical exam to rule out medical causes for current symptoms.  Date of last physical exam was within the past year. Client was encouraged to follow up with PCP if symptoms were to develop. The patient has a non-[facility] Primary Care Provider. [facility] Dr. Alcantar .. Patient reports the following current medical  concerns: damage to liver. .  Patient denies any issues with pain.  Patient denies pregnancy. There are significant appetite / nutritional concerns / weight changes. Patient does not report a history of an eating disorder. Patient does not report a history of head injury / trauma / cognitive impairment.     Patient reports current meds as:   Outpatient Medications Marked as Taking for the 2/1/23 encounter (Hospital Encounter)   Medication Sig     gabapentin (NEURONTIN) 100 MG capsule Take 100 mg by mouth 3 times daily     hydrOXYzine (ATARAX) 25 MG tablet Take 25-50 mg by mouth every 4 hours as needed for anxiety     mirtazapine (REMERON) 30 MG tablet Take 30 mg by mouth At Bedtime     multivitamin, therapeutic with minerals (THERA-VIT-M) TABS tablet Take 1 tablet by mouth daily     PARoxetine (PAXIL) 30 MG tablet Take 1 tablet (30 mg) by mouth daily       Medication Adherence:  Patient reports taking prescribed medications as prescribed.    Patient Allergies:  No Known Allergies    Medical History:    Past Medical History:   Diagnosis Date     Drug dependence (H)     2004, treatment at [facility]     History of other genital system and obstetric disorders     G2, P0-0-2-0     History of other infectious or parasitic disease     Childhood     Nondependent alcohol abuse, in remission     No Comments Provided       Substance Use:  Patient reported no family history of chemical health issues. Patient has received substance use disorder and/or gambling treatment in the past.  Patient reports the following dates and locations of treatment services:  Patient reports to many to remember and list.  . Patient has been to detox. Patient is not currently receiving any chemical dependency treatment. Patient reports no history of support group attendance.        Substance Age of first use Pattern and duration of use (include amounts and frequency) Date of last use     Withdrawal potential Route of administration   Has used  Alcohol 18 1 liter daily vodka  1/31/23 No oral   Has used Marijuana   18 1-2 grams daily  11/31/23 No smoked     Has not used Amphetamines          Has not used Cocaine/ crack           Has not used Hallucinogens        Has not used Inhalants        Has not used Heroin        Has notused Other Opiates        Has not used Benzodiazepine          Has not used Barbiturates        Has not used Over the counter meds.        Has not used Caffeine        Has used Nicotine  20 Daily 3 quarters of a pack  1/31/23 No smoked   Has not used other substances not listed above:  Identify:              Patient reported the following problems as a result of their substance use: family problems, financial problems and relationship problems.  Patient is concerned about substance use.     Patient reports experiencing the following withdrawal symptoms within the past 12 months: none and the following within the past 30 days: sweating, shaky/jittery/tremors, fatigue, sad/depressed feeling, irritability, high blood pressure, nausea/vomiting, diarrhea, diminished appetite, unable to eat and anxiety/worry.   Patients reports urges to use Alcohol.  Patient reports she has used more Alcohol than intended and over a longer period of time than intended. Patient reports she has had unsuccessful attempts to cut down or control use of Alcohol.  Patient reports longest period of abstinence was 4 years and return to use was due to emotional abandonment remembering childhood pain. Patient reports she has needed to use more Alcohol to achieve the same effect.  Patient does  report diminished effect with use of same amount of Alcohol.     Patient does  report a great deal of time is spent in activities necessary to obtain, use, or recover from Alcohol effects.  Patient does  report important social, occupational, or recreational activities are given up or reduced because of Alcohol use.  Alcohol use is continued despite knowledge of having a  "persistent or recurrent physical or psychological problem that is likely to have caused or exacerbated by use.  Patient reports the following problem behaviors while under the influence of substances driving under the influence driving with others under the influence and mixing alcohol with valium.     Patient reports her recovery goals are to \"be stable, take better care of my pets , family, and friends and self\". \"To feel happier with a sense of peace, and to function like a normal person\"     Patient reports substance use has not impacted her ability to function in a school setting. Patient reports substance use has impacted her ability to function in a work setting.  Patients demographics and history impact her recovery in the following ways:  Patient denies.     Patient does not have a history of gambling concerns and/or treatment. Patient does  have other addictive behaviors she is concerned about shopping, and codependency.         Significant Losses / Trauma / Abuse / Neglect Issues:   Patient did not serve in the .  There are indications or report of significant loss, trauma, abuse or neglect issues related to: are no indications and client denies any losses, trauma, abuse, or neglect concerns.  Concerns for possible neglect are not present.     Safety Assessment:   Current Safety Concerns:  [place] Suicide Severity Rating Scale (Short Version)  [place] Suicide Severity Rating (Short Version) 2/1/2023   Over the past 2 weeks have you felt down, depressed, or hopeless? yes   Over the past 2 weeks have you had thoughts of killing yourself? no   Have you ever attempted to kill yourself? no   Q1 Wished to be Dead (Past Month) no   Q2 Suicidal Thoughts (Past Month) no   Q3 Suicidal Thought Method no   Q4 Suicidal Intent without Specific Plan no   Q5 Suicide Intent with Specific Plan no   Q6 Suicide Behavior (Lifetime) no   Level of Risk per Screen low risk     Patient denies current homicidal ideation " and behaviors.  Patient denies current self-injurious ideation and behaviors.    Patient denied risk behaviors associated with substance use.  Patient denies any high risk behaviors associated with mental health symptoms.  Patient reports the following current concerns for their personal safety: None.  Patient reports there are not firearms in the house. There are no firearms in the home..     History of Safety Concerns:  Patient denied a history of homicidal ideation.     Patient denied a history of personal safety concerns.    Patient denied a history of assaultive behaviors.    Patient denied a history of sexual assault behaviors.     Patient denied a history of risk behaviors associated with substance use.  Patient denies any history of high risk behaviors associated with mental health symptoms.  Patient reports the following protective factors: spirituality, positive relationships positive social network, safe and stable environment, regular sleep, sense of belonging to self, purpose to self, sense of meaning and positive social skills    Risk Plan:  See Recommendations for Safety and Risk Management Plan    Review of Symptoms per patient report:  Substance Use:  passing out, vomiting, hangovers, other substance related medical issue No current concerns, daily use, family relationship problems due to substance use, social problems related to substance use, driving under the influence and cravings/urges to use     Collateral Contact Summary:   Collateral contacts contributing to this assessment:  None     If court related records were reviewed, summarize here: None    Information from collateral contacts supported/largely agreed with information from the client and associated risk ratings.    Information in this assessment was obtained from the medical record and provided by patient who is a good historian.    Patient will have open access to their mental health medical record.    Diagnostic Criteria: 1.)  Alcohol/drug is often taken in larger amounts or over a longer period than was intended.  Met for Alcohol.  2.) There is a persistent desire or unsuccessful efforts to cut down or control alcohol/drug use.  Met for Alcohol.  3.) A great deal of time is spent in activities necessary to obtain alcohol, use alcohol, or recover from its effects.  Met for Alcohol.  4.) Craving, or a strong desire or urge to use alcohol/drug.  Met for Alcohol.  6.) Continued alcohol use despite having persistent or recurrent social or interpersonal problems caused or exacerbated by the effects of alcohol/drug.  Met for Alcohol.  7.) Important social, occupational, or recreational activities are given up or reduced because of alcohol/drug use.  Met for Alcohol.  8.) Recurrent alcohol/drug use in situations in which it is physically hazardous.  Met for Alcohol.  10.) Tolerance, as defined by either of the following: A need for markedly increased amounts of alcohol/drug to achieve intoxication or desired effect..  Met for Alcohol.  11.) Withdrawal, as manifested by either of the following: The characteristic withdrawal syndrome for alcohol/drug (refer to Criteria A and B of the criteria set for alcohol/drug withdrawal).. Met for Alcohol.     As evidenced by self report and criteria, client meets the following DSM5 Diagnoses:   (Sustained by DSM5 Criteria Listed Above)  Alcohol Use Disorder   303.90 (F10.20) Severe In a controlled environment.    Recommendations:     1. Plan for Safety and Risk Management:  Recommended that patient call 911 or go to the local ED should there be a change in any of these risk factors..      Report to child / adult protection services was NA.     2. STEPHANIE Referrals:   Recommendations:  Patient should attend [facility] programming such as [facility].  Patient reports they are willing to follow these recommendations.     Patient would like the following family or other support people involved in their treatment:  Patient denies. Patient does not have a history of opiate use.    3. Mental Health Referrals:  Patient should seek or continue professional mental health services such as a therapist and psychiatrist to learn how her substance use overall effects her mental health.       4. Patient identified no cultural concerns that need to be addressed in treatment.    5. Recommendations for treatment focus:   Alcohol / Substance Use - tolerance, withdrawal, progressive use, loss of control, cravings. Patient may lack insight into triggers/cues. Patient may lack insight coping skills. Patient is at high risk for relapse at this time. .     Clinical Substantiation:  Summary: Discussed with pt their desired outcome; reviewed living situation and community supports; reviewed type of use and risk factors for continued use. Risk ratings/justification below:   Dimension 1 -  Acute Intoxication/Withdrawal: 0 - No Problem Patient is medically stable and cleared for discharge.   Dimension 2 - Biomedical: 1 - Minor Problem Patient has a primary care provider. Patient has not had a physical in the last year. Patient is encouraged to seek medical care from her primary care provider.   Dimension 3 - Emotional/Behavioral/Cognitive Conditions: 2 - Moderate Problem Patient should seek or continue professional mental health services such as a therapist and psychiatrist to learn how her substance use overall effects her mental health.     Dimension 4 - Readiness to Change:  1 - Minor Problem Patient endorses external and internal motivators for change at this time. Patient may lack insight into changes needed to sustain sobriety. Patient appeard to be in the preparations stage of change at this time.   Dimension 5 - Relapse/Continued Use/ Continued Problem Potential: 3 - Severe Problem  tolerance, withdrawal, progressive use, loss of control, cravings. Patient may lack insight into triggers/cues. Patient may lack insight coping skills. Patient is  at high risk for relapse at this time. .   Dimension 6 - Recovery Environment:  3 - Severe Problem Patient is unemployed at this time. Patient reports her house is stable. Patient may lack social support. Patient may lack social sober support at this time.     Placement/Program/Barriers Identified: None     Referral:   [facility] IOP Programming.     [facility]     Provider Name/ Credentials:  Khloe Kraft MS, [facility]  February 3, 2023

## 2023-02-03 NOTE — PROVIDER NOTIFICATION
02/03/23 1118   Vital Signs   Temp 97.5  F (36.4  C)   Temp src Temporal   Resp 18   Pulse 68   Pulse Rate Source Monitor   BP (!) 149/91   BP Location Left arm   Patient Position Supine   Cuff Size Adult Regular     Patient was encouraged to make a follow-up appointment with Primary Care Provider within 1-2 weeks of discharge. Patient declined to make an appointment on the unit but did state she would make one at home when she was discharged.

## 2023-02-03 NOTE — PLAN OF CARE
"Goal Outcome Evaluation:    Plan of Care Reviewed With: patient    Overall Patient Progress: improving    Patient continues in alcohol detox.  She was isolative in her room for the first half of the shift, but stayed visible in the milieu after 8pm vitals.   Pt reports feeling very tired, low energy, and low motivation. Describes mood as \"ok\". She denied SI/SIB/HI.  She reports an improved appetite today, states she finally feels hungry.  She ate 100% of her dinner, requested additional food, and is hydrating adequately.    She denied pain.    1600 vitals: /79  Pulse 78  Temp 97.6  F (36.4  C) (Temporal)  Resp 16  SpO2 97%    2000 vitals: /70  Pulse 68  Temp 98.6  F (37  C) (Oral)  Resp 16  SpO2 95%  BMI 27.33 kg/m      MSSA: 4 & 4    PRNs administered this shift: trazodone 50 mg    Discharge plans: IOP at [facility]  "

## 2023-02-03 NOTE — PLAN OF CARE
Problem: Alcohol Withdrawal  Goal: Alcohol Withdrawal Symptom Control  Outcome: Progressing     Problem: Sleep Disturbance  Goal: Adequate Sleep/Rest  Outcome: Progressing   Goal Outcome Evaluation:    The Pt slept for 6.75 hours, and his MSSA score was 6 ; hence, she received no Valium once during the night. The fifteen minutes safety checks are in progress with no related incidence, and she is on seizure precaution with no seizure activity.

## 2023-02-03 NOTE — PROGRESS NOTES
Writer completed assessment with patient and sent referrals over to [facility] and [facility].

## 2023-05-29 ENCOUNTER — NURSE TRIAGE (OUTPATIENT)
Dept: NURSING | Facility: CLINIC | Age: 58
End: 2023-05-29
Payer: COMMERCIAL

## 2023-05-29 ENCOUNTER — HOSPITAL ENCOUNTER (EMERGENCY)
Facility: CLINIC | Age: 58
Discharge: HOME | End: 2023-05-30
Attending: EMERGENCY MEDICINE | Admitting: EMERGENCY MEDICINE
Payer: COMMERCIAL

## 2023-05-29 DIAGNOSIS — F10.90 ALCOHOL USE DISORDER: ICD-10-CM

## 2023-05-29 LAB
ALBUMIN SERPL BCG-MCNC: 4.3 G/DL (ref 3.5–5.2)
ALCOHOL BREATH TEST: 0.01 (ref 0–0.01)
ALP SERPL-CCNC: 89 U/L (ref 35–104)
ALT SERPL W P-5'-P-CCNC: 20 U/L (ref 10–35)
ANION GAP SERPL CALCULATED.3IONS-SCNC: 14 MMOL/L (ref 7–15)
AST SERPL W P-5'-P-CCNC: 25 U/L (ref 10–35)
BASOPHILS # BLD AUTO: 0.1 10E3/UL (ref 0–0.2)
BASOPHILS NFR BLD AUTO: 1 %
BILIRUB SERPL-MCNC: 0.2 MG/DL
BUN SERPL-MCNC: 16.6 MG/DL (ref 6–20)
CALCIUM SERPL-MCNC: 9 MG/DL (ref 8.6–10)
CHLORIDE SERPL-SCNC: 99 MMOL/L (ref 98–107)
CREAT SERPL-MCNC: 0.98 MG/DL (ref 0.51–0.95)
DEPRECATED HCO3 PLAS-SCNC: 21 MMOL/L (ref 22–29)
EOSINOPHIL # BLD AUTO: 0.2 10E3/UL (ref 0–0.7)
EOSINOPHIL NFR BLD AUTO: 3 %
ERYTHROCYTE [DISTWIDTH] IN BLOOD BY AUTOMATED COUNT: 12 % (ref 10–15)
GFR SERPL CREATININE-BSD FRML MDRD: 67 ML/MIN/1.73M2
GLUCOSE SERPL-MCNC: 103 MG/DL (ref 70–99)
HCT VFR BLD AUTO: 36.1 % (ref 35–47)
HGB BLD-MCNC: 12.6 G/DL (ref 11.7–15.7)
IMM GRANULOCYTES # BLD: 0 10E3/UL
IMM GRANULOCYTES NFR BLD: 0 %
LYMPHOCYTES # BLD AUTO: 2.8 10E3/UL (ref 0.8–5.3)
LYMPHOCYTES NFR BLD AUTO: 35 %
MCH RBC QN AUTO: 30.4 PG (ref 26.5–33)
MCHC RBC AUTO-ENTMCNC: 34.9 G/DL (ref 31.5–36.5)
MCV RBC AUTO: 87 FL (ref 78–100)
MONOCYTES # BLD AUTO: 0.7 10E3/UL (ref 0–1.3)
MONOCYTES NFR BLD AUTO: 9 %
NEUTROPHILS # BLD AUTO: 4.2 10E3/UL (ref 1.6–8.3)
NEUTROPHILS NFR BLD AUTO: 52 %
NRBC # BLD AUTO: 0 10E3/UL
NRBC BLD AUTO-RTO: 0 /100
PLATELET # BLD AUTO: 209 10E3/UL (ref 150–450)
POTASSIUM SERPL-SCNC: 3.8 MMOL/L (ref 3.4–5.3)
PROT SERPL-MCNC: 6.7 G/DL (ref 6.4–8.3)
RBC # BLD AUTO: 4.14 10E6/UL (ref 3.8–5.2)
SARS-COV-2 RNA RESP QL NAA+PROBE: NEGATIVE
SODIUM SERPL-SCNC: 134 MMOL/L (ref 136–145)
WBC # BLD AUTO: 8 10E3/UL (ref 4–11)

## 2023-05-29 PROCEDURE — 85025 COMPLETE CBC W/AUTO DIFF WBC: CPT | Performed by: EMERGENCY MEDICINE

## 2023-05-29 PROCEDURE — 82075 ASSAY OF BREATH ETHANOL: CPT | Performed by: EMERGENCY MEDICINE

## 2023-05-29 PROCEDURE — 87635 SARS-COV-2 COVID-19 AMP PRB: CPT | Performed by: EMERGENCY MEDICINE

## 2023-05-29 PROCEDURE — C9803 HOPD COVID-19 SPEC COLLECT: HCPCS | Performed by: EMERGENCY MEDICINE

## 2023-05-29 PROCEDURE — 36415 COLL VENOUS BLD VENIPUNCTURE: CPT | Performed by: EMERGENCY MEDICINE

## 2023-05-29 PROCEDURE — 99283 EMERGENCY DEPT VISIT LOW MDM: CPT | Performed by: EMERGENCY MEDICINE

## 2023-05-29 PROCEDURE — 80053 COMPREHEN METABOLIC PANEL: CPT | Performed by: EMERGENCY MEDICINE

## 2023-05-29 PROCEDURE — 99285 EMERGENCY DEPT VISIT HI MDM: CPT | Performed by: EMERGENCY MEDICINE

## 2023-05-29 RX ORDER — METOPROLOL TARTRATE 25 MG/1
25 TABLET, FILM COATED ORAL 2 TIMES DAILY
Status: ON HOLD | COMMUNITY
End: 2023-06-13

## 2023-05-29 RX ORDER — DIAZEPAM 5 MG
5-20 TABLET ORAL EVERY 30 MIN PRN
Status: DISCONTINUED | OUTPATIENT
Start: 2023-05-29 | End: 2023-05-30 | Stop reason: HOSPADM

## 2023-05-29 RX ORDER — MULTIPLE VITAMINS W/ MINERALS TAB 9MG-400MCG
1 TAB ORAL DAILY
Status: DISCONTINUED | OUTPATIENT
Start: 2023-05-30 | End: 2023-05-30 | Stop reason: HOSPADM

## 2023-05-29 RX ORDER — FOLIC ACID 1 MG/1
1 TABLET ORAL DAILY
Status: DISCONTINUED | OUTPATIENT
Start: 2023-05-30 | End: 2023-05-30 | Stop reason: HOSPADM

## 2023-05-29 ASSESSMENT — ACTIVITIES OF DAILY LIVING (ADL): ADLS_ACUITY_SCORE: 35

## 2023-05-30 ENCOUNTER — TELEPHONE (OUTPATIENT)
Dept: BEHAVIORAL HEALTH | Facility: CLINIC | Age: 58
End: 2023-05-30

## 2023-05-30 VITALS
HEART RATE: 80 BPM | SYSTOLIC BLOOD PRESSURE: 180 MMHG | OXYGEN SATURATION: 100 % | RESPIRATION RATE: 18 BRPM | TEMPERATURE: 98.1 F | DIASTOLIC BLOOD PRESSURE: 95 MMHG

## 2023-05-30 LAB
AMPHETAMINES UR QL SCN: ABNORMAL
BARBITURATES UR QL SCN: ABNORMAL
BENZODIAZ UR QL SCN: ABNORMAL
BZE UR QL SCN: ABNORMAL
CANNABINOIDS UR QL SCN: ABNORMAL
OPIATES UR QL SCN: ABNORMAL

## 2023-05-30 PROCEDURE — 80307 DRUG TEST PRSMV CHEM ANLYZR: CPT | Performed by: EMERGENCY MEDICINE

## 2023-05-30 RX ORDER — METOPROLOL TARTRATE 25 MG/1
25 TABLET, FILM COATED ORAL 2 TIMES DAILY
Status: CANCELLED | OUTPATIENT
Start: 2023-05-30

## 2023-05-30 RX ORDER — PAROXETINE 30 MG/1
30 TABLET, FILM COATED ORAL DAILY
Status: CANCELLED | OUTPATIENT
Start: 2023-05-30

## 2023-05-30 RX ORDER — OLANZAPINE 10 MG/2ML
10 INJECTION, POWDER, FOR SOLUTION INTRAMUSCULAR 3 TIMES DAILY PRN
Status: CANCELLED | OUTPATIENT
Start: 2023-05-30

## 2023-05-30 RX ORDER — DIAZEPAM 5 MG
5-20 TABLET ORAL EVERY 30 MIN PRN
Status: CANCELLED | OUTPATIENT
Start: 2023-05-30

## 2023-05-30 RX ORDER — FOLIC ACID 1 MG/1
1 TABLET ORAL DAILY
Status: CANCELLED | OUTPATIENT
Start: 2023-05-30

## 2023-05-30 RX ORDER — OLANZAPINE 10 MG/1
10 TABLET ORAL 3 TIMES DAILY PRN
Status: CANCELLED | OUTPATIENT
Start: 2023-05-30

## 2023-05-30 RX ORDER — MAGNESIUM HYDROXIDE/ALUMINUM HYDROXICE/SIMETHICONE 120; 1200; 1200 MG/30ML; MG/30ML; MG/30ML
30 SUSPENSION ORAL EVERY 4 HOURS PRN
Status: CANCELLED | OUTPATIENT
Start: 2023-05-30

## 2023-05-30 RX ORDER — GABAPENTIN 100 MG/1
100 CAPSULE ORAL 3 TIMES DAILY
Status: CANCELLED | OUTPATIENT
Start: 2023-05-30

## 2023-05-30 RX ORDER — MULTIPLE VITAMINS W/ MINERALS TAB 9MG-400MCG
1 TAB ORAL DAILY
Status: CANCELLED | OUTPATIENT
Start: 2023-05-30

## 2023-05-30 RX ORDER — TRAZODONE HYDROCHLORIDE 50 MG/1
50 TABLET, FILM COATED ORAL
Status: CANCELLED | OUTPATIENT
Start: 2023-05-30

## 2023-05-30 RX ORDER — HYDROXYZINE HYDROCHLORIDE 25 MG/1
25 TABLET, FILM COATED ORAL EVERY 4 HOURS PRN
Status: CANCELLED | OUTPATIENT
Start: 2023-05-30

## 2023-05-30 RX ORDER — ACETAMINOPHEN 325 MG/1
650 TABLET ORAL EVERY 4 HOURS PRN
Status: CANCELLED | OUTPATIENT
Start: 2023-05-30

## 2023-05-30 RX ORDER — POLYETHYLENE GLYCOL 3350 17 G/17G
17 POWDER, FOR SOLUTION ORAL DAILY PRN
Status: CANCELLED | OUTPATIENT
Start: 2023-05-30

## 2023-05-30 RX ORDER — MIRTAZAPINE 15 MG/1
15 TABLET, FILM COATED ORAL AT BEDTIME
Status: CANCELLED | OUTPATIENT
Start: 2023-05-30

## 2023-05-30 ASSESSMENT — ACTIVITIES OF DAILY LIVING (ADL)
ADLS_ACUITY_SCORE: 35

## 2023-05-30 NOTE — CONSULTS
Name:  Steph Drew  : 1965  Date: 2023  Time: 12:39am  Location of patient: [facility]  Location of doctor: Remote  Spoke with: Meseret  HPI: The patient is a 57-year-old female with a past psychiatric history of alcohol use disorder who presents for alcohol detoxification. She noted drinking   L of liquor since her last detox from [facility] in 2023. Has last drink was 6 hours prior to presentation and her alcohol level was 0.011. She noted cannabis use and is awaiting the results of her UDS. She denies any SI, HI, or AVH, noting only anxiety as her current symptoms. She denies any history of DTs or withdrawal seizures. She denies any medical history.   Plan: Voluntary admission to behavioral health (detox)

## 2023-05-30 NOTE — TELEPHONE ENCOUNTER
S: [facility] , Provider Wan calling at 00:23 with clinical on a 57 year old/Female presenting for alcohol detox.     B: Pt presents for ETOH detox.   Currently reports drinking half liter since Feb after discharging from detox unit  Patient reports last use was 6PM.  Pt HA: 0.011  Pt  denies hx of DT  Pt  denies hx of seizures. Last seizure: N/A  Pt endorsing the following symptoms of withdrawal: Anxious  MSSA Score:     Pt denies acute mental health or medical concerns.   Pt endorses other drug use: Marijuana Amount/frequency: at least the past week    Does Pt have a detox care plan in [facility]? No  Does pt present with specific needs, assistive devices, or exclusionary criteria? None  Is the patient ambulating, eating and drinking in the ED? yes    A: Pt meets criteria to be presented for IP detox admission. Patient is voluntary  COVID: Negative  Utox: Ordered, not yet collected  CMP: Abnormalities: Sodium 134 / CO2 21 / Creatinine 0.98 / Gluc 103  CBC: WNL  HCG: N/A     Patient cleared and ready for behavioral bed placement: Yes  Presenting for admission to 3A/CD     00:30 - Left message for [facility] provider. Awaiting callback  00:38 - Dr. Martínez accepts for detox. Placed pt in queue    R: 3A / [place] / NELL    00:42 - Notified unit and ED    UPDATE:  05:47 - Received call from Dr. Zhang who reports that pt no longer wants detox and will be discharging home  05:28 - Updated unit 3A on POC. Pt removed from unit queue    Pt removed from detox wait list and intake is no longer following

## 2023-05-30 NOTE — ED PROVIDER NOTES
"    [facility] EMERGENCY DEPARTMENT ([facility])  5/29/23  ED 04    History     Chief Complaint   Patient presents with     Alcohol Problem     Detox from alcohol, last drink at 6 pm, \"When I withdrawal from alcohol my blood pressure spikes and I get scared.\"     The history is provided by the patient and medical records.   Alcohol Problem      Steph Drew is a 57 year old female with a past medical history significant for alcohol use disorder with hx of DT, cannabis use disorder, MDD, MASOOD and HTN who presents to the Emergency Department seeking detox. Patient states she has been experiencing withdrawal symptoms recently as her blood pressure has been very high. She states her BP was 213/179 earlier today. She has not had any DT recently. She has been drinking a pint of vodka a day. Patients last drink was 6 pm today. She has been using some marijuana. She states she has been having some nausea and vomiting today due to panic. She denies having any other medical problems.     Per Chart Review: Patient was admitted to Station 3A for alcohol withdrawal 2/1-2/3. patient was treated with Valium to manage withdrawal. She required a TOTAL of 45mg of Valium after arrival in ED. Hydroxyzine and Trazodone were utilized prn for management of anxiety and sleep, respectively. Patient was treated with multivitamin, thiamine, and folic acid daily. Her Gabapentin increased form 100 mg to 300mg TID with improvements to anxiety.     Past Medical History  Past Medical History:   Diagnosis Date     Drug dependence (H)     2004, treatment at [facility]     History of other genital system and obstetric disorders     G2, P0-0-2-0     History of other infectious or parasitic disease     Childhood     Nondependent alcohol abuse, in remission     No Comments Provided     Past Surgical History:   Procedure Laterality Date     APPENDECTOMY OPEN      Age 17     TONSILLECTOMY, ADENOIDECTOMY, COMBINED      Age 8     gabapentin " (NEURONTIN) 100 MG capsule  hydrOXYzine (ATARAX) 25 MG tablet  metoprolol tartrate (LOPRESSOR) 25 MG tablet  PARoxetine (PAXIL) 30 MG tablet  traZODone (DESYREL) 50 MG tablet  folic acid (FOLVITE) 1 MG tablet  mirtazapine (REMERON) 15 MG tablet  multivitamin, therapeutic with minerals (THERA-VIT-M) TABS tablet  naltrexone (DEPADE/REVIA) 50 MG tablet  nicotine (NICODERM CQ) 21 MG/24HR 24 hr patch  nicotine polacrilex (NICORETTE) 4 MG gum  thiamine (B-1) 100 MG tablet      No Known Allergies  Family History  Family History   Problem Relation Age of Onset     Family History Negative Mother         Good Health     Family History Negative Father         Good Health     Family History Negative Sister         Good Health     Family History Negative Brother         Good Health     Family History Negative Brother         Good Health     Family History Negative Brother         Good Health     Social History   Social History     Tobacco Use     Smoking status: Every Day     Packs/day: 1.00     Types: Cigarettes     Smokeless tobacco: Never   Substance Use Topics     Alcohol use: Yes     Drug use: Yes     Types: Marijuana      Past medical history, past surgical history, medications, allergies, family history, and social history were reviewed with the patient. No additional pertinent items.      A medically appropriate review of systems was performed with pertinent positives and negatives noted in the HPI, and all other systems negative.    Physical Exam   BP: (!) 165/102  Pulse: 84  Temp: 97.8  F (36.6  C)  Resp: 16  SpO2: 98 %  Physical Exam  Vitals and nursing note reviewed.   Constitutional:       General: She is not in acute distress.     Appearance: She is well-developed. She is not ill-appearing.   HENT:      Head: Normocephalic and atraumatic.      Right Ear: External ear normal.      Left Ear: External ear normal.      Nose: Nose normal.   Eyes:      Extraocular Movements: Extraocular movements intact.       Conjunctiva/sclera: Conjunctivae normal.   Pulmonary:      Effort: Pulmonary effort is normal. No respiratory distress.   Abdominal:      General: There is no distension.   Musculoskeletal:         General: No swelling or deformity.      Cervical back: Normal range of motion and neck supple.   Skin:     General: Skin is warm and dry.   Neurological:      Mental Status: Mental status is at baseline.      Comments: Alert, oriented   Psychiatric:         Mood and Affect: Mood normal.         Behavior: Behavior normal.         ED Course, Procedures, & Data      Procedures       Results for orders placed or performed during the hospital encounter of 05/29/23   Comprehensive metabolic panel     Status: Abnormal   Result Value Ref Range    Sodium 134 (L) 136 - 145 mmol/L    Potassium 3.8 3.4 - 5.3 mmol/L    Chloride 99 98 - 107 mmol/L    Carbon Dioxide (CO2) 21 (L) 22 - 29 mmol/L    Anion Gap 14 7 - 15 mmol/L    Urea Nitrogen 16.6 6.0 - 20.0 mg/dL    Creatinine 0.98 (H) 0.51 - 0.95 mg/dL    Calcium 9.0 8.6 - 10.0 mg/dL    Glucose 103 (H) 70 - 99 mg/dL    Alkaline Phosphatase 89 35 - 104 U/L    AST 25 10 - 35 U/L    ALT 20 10 - 35 U/L    Protein Total 6.7 6.4 - 8.3 g/dL    Albumin 4.3 3.5 - 5.2 g/dL    Bilirubin Total 0.2 <=1.2 mg/dL    GFR Estimate 67 >60 mL/min/1.73m2   Asymptomatic COVID-19 Virus (Coronavirus) by PCR Nasopharyngeal     Status: Normal    Specimen: Nasopharyngeal; Swab   Result Value Ref Range    SARS CoV2 PCR Negative Negative    Narrative    Testing was performed using the Xpert Xpress SARS-CoV-2 Assay on the Cepheid Gene-Xpert Instrument Systems. Additional information about this Emergency Use Authorization (EUA) assay can be found via the Lab Guide. This test should be ordered for the detection of SARS-CoV-2 in individuals who meet SARS-CoV-2 clinical and/or epidemiological criteria as well as from individuals without symptoms or other reasons to suspect COVID-19. Test performance for asymptomatic  patients has only been established in anterior nasal swab specimens. This test is for in vitro diagnostic use under the FDA EUA for laboratories certified under CLIA to perform high complexity testing. This test has not been FDA cleared or approved. A negative result does not rule out the presence of PCR inhibitors in the specimen or target RNA concentration below the limit of detection for the assay. The possibility of a false negative should be considered if the patient's recent exposure or clinical presentation suggests COVID-19. This test was validated by the [facility]. This laboratory is certified under the Clinical Laboratory Improvement Amendments (CLIA) as qualified to perform high complexity laboratory testing.     CBC with platelets and differential     Status: None   Result Value Ref Range    WBC Count 8.0 4.0 - 11.0 10e3/uL    RBC Count 4.14 3.80 - 5.20 10e6/uL    Hemoglobin 12.6 11.7 - 15.7 g/dL    Hematocrit 36.1 35.0 - 47.0 %    MCV 87 78 - 100 fL    MCH 30.4 26.5 - 33.0 pg    MCHC 34.9 31.5 - 36.5 g/dL    RDW 12.0 10.0 - 15.0 %    Platelet Count 209 150 - 450 10e3/uL    % Neutrophils 52 %    % Lymphocytes 35 %    % Monocytes 9 %    % Eosinophils 3 %    % Basophils 1 %    % Immature Granulocytes 0 %    NRBCs per 100 WBC 0 <1 /100    Absolute Neutrophils 4.2 1.6 - 8.3 10e3/uL    Absolute Lymphocytes 2.8 0.8 - 5.3 10e3/uL    Absolute Monocytes 0.7 0.0 - 1.3 10e3/uL    Absolute Eosinophils 0.2 0.0 - 0.7 10e3/uL    Absolute Basophils 0.1 0.0 - 0.2 10e3/uL    Absolute Immature Granulocytes 0.0 <=0.4 10e3/uL    Absolute NRBCs 0.0 10e3/uL   Alcohol breath test POCT     Status: Abnormal   Result Value Ref Range    Alcohol Breath Test 0.011 (A) 0.00 - 0.01   CBC with platelets differential     Status: None    Narrative    The following orders were created for panel order CBC with platelets differential.  Procedure                               Abnormality         Status                      ---------                               -----------         ------                     CBC with platelets and d...[[id number]]                      Final result                 Please view results for these tests on the individual orders.              Results for orders placed or performed during the hospital encounter of 05/29/23   Comprehensive metabolic panel     Status: Abnormal   Result Value Ref Range    Sodium 134 (L) 136 - 145 mmol/L    Potassium 3.8 3.4 - 5.3 mmol/L    Chloride 99 98 - 107 mmol/L    Carbon Dioxide (CO2) 21 (L) 22 - 29 mmol/L    Anion Gap 14 7 - 15 mmol/L    Urea Nitrogen 16.6 6.0 - 20.0 mg/dL    Creatinine 0.98 (H) 0.51 - 0.95 mg/dL    Calcium 9.0 8.6 - 10.0 mg/dL    Glucose 103 (H) 70 - 99 mg/dL    Alkaline Phosphatase 89 35 - 104 U/L    AST 25 10 - 35 U/L    ALT 20 10 - 35 U/L    Protein Total 6.7 6.4 - 8.3 g/dL    Albumin 4.3 3.5 - 5.2 g/dL    Bilirubin Total 0.2 <=1.2 mg/dL    GFR Estimate 67 >60 mL/min/1.73m2   Asymptomatic COVID-19 Virus (Coronavirus) by PCR Nasopharyngeal     Status: Normal    Specimen: Nasopharyngeal; Swab   Result Value Ref Range    SARS CoV2 PCR Negative Negative    Narrative    Testing was performed using the Xpert Xpress SARS-CoV-2 Assay on the Cepheid Gene-Xpert Instrument Systems. Additional information about this Emergency Use Authorization (EUA) assay can be found via the Lab Guide. This test should be ordered for the detection of SARS-CoV-2 in individuals who meet SARS-CoV-2 clinical and/or epidemiological criteria as well as from individuals without symptoms or other reasons to suspect COVID-19. Test performance for asymptomatic patients has only been established in anterior nasal swab specimens. This test is for in vitro diagnostic use under the FDA EUA for laboratories certified under CLIA to perform high complexity testing. This test has not been FDA cleared or approved. A negative result does not rule out the presence of PCR inhibitors  in the specimen or target RNA concentration below the limit of detection for the assay. The possibility of a false negative should be considered if the patient's recent exposure or clinical presentation suggests COVID-19. This test was validated by the [facility]. This laboratory is certified under the Clinical Laboratory Improvement Amendments (CLIA) as qualified to perform high complexity laboratory testing.     CBC with platelets and differential     Status: None   Result Value Ref Range    WBC Count 8.0 4.0 - 11.0 10e3/uL    RBC Count 4.14 3.80 - 5.20 10e6/uL    Hemoglobin 12.6 11.7 - 15.7 g/dL    Hematocrit 36.1 35.0 - 47.0 %    MCV 87 78 - 100 fL    MCH 30.4 26.5 - 33.0 pg    MCHC 34.9 31.5 - 36.5 g/dL    RDW 12.0 10.0 - 15.0 %    Platelet Count 209 150 - 450 10e3/uL    % Neutrophils 52 %    % Lymphocytes 35 %    % Monocytes 9 %    % Eosinophils 3 %    % Basophils 1 %    % Immature Granulocytes 0 %    NRBCs per 100 WBC 0 <1 /100    Absolute Neutrophils 4.2 1.6 - 8.3 10e3/uL    Absolute Lymphocytes 2.8 0.8 - 5.3 10e3/uL    Absolute Monocytes 0.7 0.0 - 1.3 10e3/uL    Absolute Eosinophils 0.2 0.0 - 0.7 10e3/uL    Absolute Basophils 0.1 0.0 - 0.2 10e3/uL    Absolute Immature Granulocytes 0.0 <=0.4 10e3/uL    Absolute NRBCs 0.0 10e3/uL   Alcohol breath test POCT     Status: Abnormal   Result Value Ref Range    Alcohol Breath Test 0.011 (A) 0.00 - 0.01   CBC with platelets differential     Status: None    Narrative    The following orders were created for panel order CBC with platelets differential.  Procedure                               Abnormality         Status                     ---------                               -----------         ------                     CBC with platelets and d...[[id number]]                      Final result                 Please view results for these tests on the individual orders.     Medications   diazepam (VALIUM) tablet 5-20 mg (has no  administration in time range)   thiamine (B-1) tablet 100 mg (has no administration in time range)   folic acid (FOLVITE) tablet 1 mg (has no administration in time range)   multivitamin w/minerals (THERA-VIT-M) tablet 1 tablet (has no administration in time range)     Labs Ordered and Resulted from Time of ED Arrival to Time of ED Departure   COMPREHENSIVE METABOLIC PANEL - Abnormal       Result Value    Sodium 134 (*)     Potassium 3.8      Chloride 99      Carbon Dioxide (CO2) 21 (*)     Anion Gap 14      Urea Nitrogen 16.6      Creatinine 0.98 (*)     Calcium 9.0      Glucose 103 (*)     Alkaline Phosphatase 89      AST 25      ALT 20      Protein Total 6.7      Albumin 4.3      Bilirubin Total 0.2      GFR Estimate 67     ALCOHOL BREATH TEST POCT - Abnormal    Alcohol Breath Test 0.011 (*)    COVID-19 VIRUS (CORONAVIRUS) BY PCR - Normal    SARS CoV2 PCR Negative     CBC WITH PLATELETS AND DIFFERENTIAL    WBC Count 8.0      RBC Count 4.14      Hemoglobin 12.6      Hematocrit 36.1      MCV 87      MCH 30.4      MCHC 34.9      RDW 12.0      Platelet Count 209      % Neutrophils 52      % Lymphocytes 35      % Monocytes 9      % Eosinophils 3      % Basophils 1      % Immature Granulocytes 0      NRBCs per 100 WBC 0      Absolute Neutrophils 4.2      Absolute Lymphocytes 2.8      Absolute Monocytes 0.7      Absolute Eosinophils 0.2      Absolute Basophils 0.1      Absolute Immature Granulocytes 0.0      Absolute NRBCs 0.0       No orders to display            Assessment & Plan      Medical Decision Making  The patient's presentation is strongly suggestive of a chronic illness severe exacerbation, progression, or side effect of treatment.    The patient's evaluation involved:  review of external note(s) from 1 sources (Most recent ER visit)  ordering and/or review of 2 test(s) in this encounter (Ethanol level as well as CMP)  review of 1 test result(s) ordered prior to this encounter (Previous ethanol  levels)  discussion of management or test interpretation with another health professional (Clinical report was discussed with mental health intake)    The patient's management involved a decision regarding hospitalization.      Patient presents to us with a chief complaint of alcohol intoxication requesting detox.  Patient is not actively suicidal or homicidal.   Previous records were reviewed, labs and vital signs were also reviewed.  Patient is medically cleared and clinical report was given to mental health intake.  We do have a bed available on the detox unit.  Patient will be placed on the alcohol withdrawal protocol and treated with Valium.      I have reviewed the nursing notes. I have reviewed the findings, diagnosis, plan and need for follow up with the patient.    New Prescriptions    No medications on file       Final diagnoses:   Alcohol dependence with uncomplicated withdrawal (H)   I, SALVADOR SCHROEDER, am serving as a trained medical scribe to document services personally performed by  Hernando Blas DO, based on the provider's statements to me.      IHernando DO, was physically present and have reviewed and verified the accuracy of this note documented by SALVADOR SCHROEDER.     Hernando Blas DO  [facility] EMERGENCY DEPARTMENT  5/29/2023     Hernando Blas DO  05/30/23 0025

## 2023-05-30 NOTE — TELEPHONE ENCOUNTER
Triage Call:    Patient calling to request assistance with alcohol withdrawal.  She reports that her last drink was at 1700.  She reports that when she tries to stop drinking alcohol that her blood pressure increases overnight.  She lives alone.  She was looking to get connected with [facility].    Transferred call to [facility] for further assistance.    Dina Bird RN  05/29/23 8:49 PM  [facility] Nurse Advisor      Reason for Disposition    Requesting admission for alcohol use, addiction, or withdrawal    Health Information question, no triage required and triager able to answer question    Additional Information    Negative: [1] Caller is not with the adult (patient) AND [2] reporting urgent symptoms    Negative: Lab result questions    Negative: Medication questions    Negative: Caller can't be reached by phone    Negative: Caller has already spoken to PCP or another triager    Negative: RN needs further essential information from caller in order to complete triage    Negative: Requesting regular office appointment    Negative: [1] Caller requesting NON-URGENT health information AND [2] PCP's office is the best resource    Protocols used: ALCOHOL USE AND [id number], INFORMATION ONLY CALL - NO TRIAGE-A-AH

## 2023-05-30 NOTE — DISCHARGE INSTRUCTIONS
Please use the below resources and your primary care physician to safely cease alcohol and/or substance use.     Return to the ED if you are having any urgent/life-threatening concerns.     DISCHARGE RESOURCES:  -[facility]: [phone] (detox), [phone] (outpatient & [facility])  -[facility] ([address]): [phone]  -[facility] Detox: [phone]  -[place] [place] [facility]: [phone]  -[facility] - self management for addiction recovery:  www.smartrecovery.org    -[facility]: [phone].  -[facility] [phone]   -Substance Abuse and Mental Health Services (www.samhsa.gov)  -Harm Reduction Coalition (www. Harmreduction.org)  -[place] Opioid Prevention Coalition: www.opioidcoalition.org  -Poison control 1-109.185.3449       Sober Support Group Information:  AA/NA & Sponsor/Support  -Alcoholics Anonymous (www.alcoholics-anonymous.org): for local information 24 hours/day  -AA Intergroup service office in [place] (http://www.aastpaul.org/) [phone]  -AA Intergroup service office in [facility]: [phone]. (http://www.a[place].org/)  -Narcotics Anonymous (www.nam[place].org) (456) 507-6044   -Sober Fun Activities: www.sober-activities.[employer]/[facility]//[facility] Recovery Connection ([facility])  [facility] connects people seeking recovery to resources that help foster and sustain long-term recovery.  Whether you are seeking resources for treatment, transportation, housing, job training, education, health care or other pathways to recovery, [facility] is a great place to start.   Phone: [phone]. www.[place]Now.com.[employer] (Great listing of all types of recovery and non-recovery related resources)

## 2023-05-30 NOTE — ED NOTES
Emergency Department Patient Sign-out       Brief HPI and ED course:  Patient is a 57 year old female signed out to me by Dr. Blas.  See initial ED Provider note for details of the presentation. In brief, patient presented for alcohol detox, concern for withdrawal.  Detox accepted the patient, awaiting bed readiness.    Vitals:   Patient Vitals for the past 24 hrs:   BP Temp Temp src Pulse Resp SpO2   05/30/23 0513 (!) 180/95 -- -- 80 18 100 %   05/30/23 0400 138/87 98.1  F (36.7  C) -- 66 16 95 %   05/30/23 0052 (!) 147/93 98  F (36.7  C) -- 69 18 95 %   05/29/23 2228 (!) 142/90 98.2  F (36.8  C) Oral 70 16 97 %   05/29/23 2144 (!) 165/102 97.8  F (36.6  C) Oral 84 16 98 %       Received Sign-out Plan:    Pending studies include: None    Plan:   -[facility] withdrawal protocol, moved to detox unit when bed is ready        Events after assuming care:  After care was assumed, a focused history and physical was performed. Agree with findings relayed by previous provider.     With monitoring, patient's mental status cleared. Patient then clinically sober with steady gait and tolerating PO. Normalization of mental status with sobering makes other causes of altered mental status very unlikely.     Patient noted desire to discharge home rather than be admitted. Discussed the risks, benefits, indications, and alternatives of detox admission vs discharge with the patient. The patient demonstrated understanding and capacity and elected to discharge.     After counseling on the diagnosis, work-up, and treatment plan, the patient was discharged. Recommended safe cessation of intoxicating substances and provided information on community treatment resources. Patient to follow-up with primary care in the coming days for recheck and further cessation counseling. Patient to return to the ED if any urgent health concerns.     Final diagnoses:   Alcohol use disorder     New Prescriptions    No medications on file       --  You  JENNY Zhang MD   Emergency Medicine   [facility] EMERGENCY DEPARTMENT  5/29/2023       Avelino Zhang MD  05/30/23 0518

## 2023-06-12 ENCOUNTER — HOSPITAL ENCOUNTER (INPATIENT)
Facility: CLINIC | Age: 58
LOS: 2 days | Discharge: HOME | DRG: 897 | End: 2023-06-14
Attending: EMERGENCY MEDICINE | Admitting: PSYCHIATRY & NEUROLOGY
Payer: COMMERCIAL

## 2023-06-12 ENCOUNTER — TELEPHONE (OUTPATIENT)
Dept: BEHAVIORAL HEALTH | Facility: CLINIC | Age: 58
End: 2023-06-12

## 2023-06-12 DIAGNOSIS — Z20.822 CONTACT WITH AND (SUSPECTED) EXPOSURE TO COVID-19: ICD-10-CM

## 2023-06-12 DIAGNOSIS — F10.230 ALCOHOL DEPENDENCE WITH UNCOMPLICATED WITHDRAWAL (H): Primary | ICD-10-CM

## 2023-06-12 DIAGNOSIS — F10.10 ALCOHOL ABUSE: ICD-10-CM

## 2023-06-12 LAB
ALBUMIN SERPL BCG-MCNC: 4.5 G/DL (ref 3.5–5.2)
ALP SERPL-CCNC: 100 U/L (ref 35–104)
ALT SERPL W P-5'-P-CCNC: 29 U/L (ref 10–35)
AMPHETAMINES UR QL SCN: ABNORMAL
ANION GAP SERPL CALCULATED.3IONS-SCNC: 18 MMOL/L (ref 7–15)
AST SERPL W P-5'-P-CCNC: 34 U/L (ref 10–35)
BARBITURATES UR QL SCN: ABNORMAL
BASOPHILS # BLD AUTO: 0.1 10E3/UL (ref 0–0.2)
BASOPHILS NFR BLD AUTO: 1 %
BENZODIAZ UR QL SCN: ABNORMAL
BILIRUB SERPL-MCNC: 0.5 MG/DL
BUN SERPL-MCNC: 15 MG/DL (ref 6–20)
BZE UR QL SCN: ABNORMAL
CALCIUM SERPL-MCNC: 9 MG/DL (ref 8.6–10)
CANNABINOIDS UR QL SCN: ABNORMAL
CHLORIDE SERPL-SCNC: 98 MMOL/L (ref 98–107)
CREAT SERPL-MCNC: 0.84 MG/DL (ref 0.51–0.95)
DEPRECATED HCO3 PLAS-SCNC: 20 MMOL/L (ref 22–29)
EOSINOPHIL # BLD AUTO: 0.2 10E3/UL (ref 0–0.7)
EOSINOPHIL NFR BLD AUTO: 2 %
ERYTHROCYTE [DISTWIDTH] IN BLOOD BY AUTOMATED COUNT: 11.7 % (ref 10–15)
ETHANOL SERPL-MCNC: 0.05 G/DL
GFR SERPL CREATININE-BSD FRML MDRD: 80 ML/MIN/1.73M2
GLUCOSE SERPL-MCNC: 128 MG/DL (ref 70–99)
HCT VFR BLD AUTO: 42.9 % (ref 35–47)
HGB BLD-MCNC: 15.2 G/DL (ref 11.7–15.7)
IMM GRANULOCYTES # BLD: 0 10E3/UL
IMM GRANULOCYTES NFR BLD: 0 %
LYMPHOCYTES # BLD AUTO: 4.2 10E3/UL (ref 0.8–5.3)
LYMPHOCYTES NFR BLD AUTO: 40 %
MCH RBC QN AUTO: 29.7 PG (ref 26.5–33)
MCHC RBC AUTO-ENTMCNC: 35.4 G/DL (ref 31.5–36.5)
MCV RBC AUTO: 84 FL (ref 78–100)
MONOCYTES # BLD AUTO: 0.7 10E3/UL (ref 0–1.3)
MONOCYTES NFR BLD AUTO: 7 %
NEUTROPHILS # BLD AUTO: 5.3 10E3/UL (ref 1.6–8.3)
NEUTROPHILS NFR BLD AUTO: 50 %
NRBC # BLD AUTO: 0 10E3/UL
NRBC BLD AUTO-RTO: 0 /100
OPIATES UR QL SCN: ABNORMAL
PLATELET # BLD AUTO: 308 10E3/UL (ref 150–450)
POTASSIUM SERPL-SCNC: 3.6 MMOL/L (ref 3.4–5.3)
PROT SERPL-MCNC: 7.5 G/DL (ref 6.4–8.3)
RBC # BLD AUTO: 5.11 10E6/UL (ref 3.8–5.2)
SARS-COV-2 RNA RESP QL NAA+PROBE: NEGATIVE
SODIUM SERPL-SCNC: 136 MMOL/L (ref 136–145)
WBC # BLD AUTO: 10.5 10E3/UL (ref 4–11)

## 2023-06-12 PROCEDURE — 36415 COLL VENOUS BLD VENIPUNCTURE: CPT | Performed by: EMERGENCY MEDICINE

## 2023-06-12 PROCEDURE — 250N000013 HC RX MED GY IP 250 OP 250 PS 637: Performed by: PSYCHIATRY & NEUROLOGY

## 2023-06-12 PROCEDURE — 128N000004 HC R&B CD ADULT

## 2023-06-12 PROCEDURE — 250N000013 HC RX MED GY IP 250 OP 250 PS 637: Performed by: EMERGENCY MEDICINE

## 2023-06-12 PROCEDURE — 87635 SARS-COV-2 COVID-19 AMP PRB: CPT | Performed by: EMERGENCY MEDICINE

## 2023-06-12 PROCEDURE — 85025 COMPLETE CBC W/AUTO DIFF WBC: CPT | Performed by: EMERGENCY MEDICINE

## 2023-06-12 PROCEDURE — 82077 ASSAY SPEC XCP UR&BREATH IA: CPT | Performed by: EMERGENCY MEDICINE

## 2023-06-12 PROCEDURE — 80307 DRUG TEST PRSMV CHEM ANLYZR: CPT | Performed by: EMERGENCY MEDICINE

## 2023-06-12 PROCEDURE — 80053 COMPREHEN METABOLIC PANEL: CPT | Performed by: EMERGENCY MEDICINE

## 2023-06-12 PROCEDURE — 99285 EMERGENCY DEPT VISIT HI MDM: CPT | Performed by: EMERGENCY MEDICINE

## 2023-06-12 RX ORDER — POLYETHYLENE GLYCOL 3350 17 G/17G
17 POWDER, FOR SOLUTION ORAL DAILY PRN
Status: DISCONTINUED | OUTPATIENT
Start: 2023-06-12 | End: 2023-06-14 | Stop reason: HOSPADM

## 2023-06-12 RX ORDER — HYDROXYZINE HYDROCHLORIDE 25 MG/1
25 TABLET, FILM COATED ORAL EVERY 4 HOURS PRN
Status: DISCONTINUED | OUTPATIENT
Start: 2023-06-12 | End: 2023-06-12

## 2023-06-12 RX ORDER — DIAZEPAM 5 MG
5-20 TABLET ORAL EVERY 30 MIN PRN
Status: DISCONTINUED | OUTPATIENT
Start: 2023-06-12 | End: 2023-06-14 | Stop reason: HOSPADM

## 2023-06-12 RX ORDER — HYDROXYZINE HYDROCHLORIDE 25 MG/1
25-50 TABLET, FILM COATED ORAL EVERY 4 HOURS PRN
Status: DISCONTINUED | OUTPATIENT
Start: 2023-06-12 | End: 2023-06-13

## 2023-06-12 RX ORDER — ATENOLOL 50 MG/1
50 TABLET ORAL DAILY PRN
Status: DISCONTINUED | OUTPATIENT
Start: 2023-06-12 | End: 2023-06-14 | Stop reason: HOSPADM

## 2023-06-12 RX ORDER — ACETAMINOPHEN 325 MG/1
650 TABLET ORAL EVERY 4 HOURS PRN
Status: DISCONTINUED | OUTPATIENT
Start: 2023-06-12 | End: 2023-06-12

## 2023-06-12 RX ORDER — NICOTINE 21 MG/24HR
1 PATCH, TRANSDERMAL 24 HOURS TRANSDERMAL DAILY
Status: DISCONTINUED | OUTPATIENT
Start: 2023-06-12 | End: 2023-06-14 | Stop reason: HOSPADM

## 2023-06-12 RX ORDER — ACETAMINOPHEN 325 MG/1
650 TABLET ORAL EVERY 4 HOURS PRN
Status: DISCONTINUED | OUTPATIENT
Start: 2023-06-12 | End: 2023-06-14 | Stop reason: HOSPADM

## 2023-06-12 RX ORDER — MIRTAZAPINE 15 MG/1
15 TABLET, FILM COATED ORAL AT BEDTIME
Status: DISCONTINUED | OUTPATIENT
Start: 2023-06-12 | End: 2023-06-12

## 2023-06-12 RX ORDER — TRAZODONE HYDROCHLORIDE 50 MG/1
50 TABLET, FILM COATED ORAL
Status: DISCONTINUED | OUTPATIENT
Start: 2023-06-12 | End: 2023-06-14 | Stop reason: HOSPADM

## 2023-06-12 RX ORDER — DIAZEPAM 5 MG
5-20 TABLET ORAL EVERY 30 MIN PRN
Status: DISCONTINUED | OUTPATIENT
Start: 2023-06-12 | End: 2023-06-12

## 2023-06-12 RX ORDER — MIRTAZAPINE 45 MG/1
45 TABLET, FILM COATED ORAL AT BEDTIME
Status: DISCONTINUED | OUTPATIENT
Start: 2023-06-12 | End: 2023-06-14 | Stop reason: HOSPADM

## 2023-06-12 RX ORDER — AMOXICILLIN 250 MG
1 CAPSULE ORAL 2 TIMES DAILY PRN
Status: DISCONTINUED | OUTPATIENT
Start: 2023-06-12 | End: 2023-06-14 | Stop reason: HOSPADM

## 2023-06-12 RX ORDER — GABAPENTIN 100 MG/1
100-300 CAPSULE ORAL 3 TIMES DAILY
Status: DISCONTINUED | OUTPATIENT
Start: 2023-06-12 | End: 2023-06-13

## 2023-06-12 RX ORDER — METOPROLOL TARTRATE 25 MG/1
25 TABLET, FILM COATED ORAL 2 TIMES DAILY
Status: DISCONTINUED | OUTPATIENT
Start: 2023-06-12 | End: 2023-06-13

## 2023-06-12 RX ORDER — LOPERAMIDE HCL 2 MG
2 CAPSULE ORAL 4 TIMES DAILY PRN
Status: DISCONTINUED | OUTPATIENT
Start: 2023-06-12 | End: 2023-06-14 | Stop reason: HOSPADM

## 2023-06-12 RX ORDER — ONDANSETRON 4 MG/1
4 TABLET, ORALLY DISINTEGRATING ORAL EVERY 6 HOURS PRN
Status: DISCONTINUED | OUTPATIENT
Start: 2023-06-12 | End: 2023-06-14 | Stop reason: HOSPADM

## 2023-06-12 RX ORDER — MAGNESIUM HYDROXIDE/ALUMINUM HYDROXICE/SIMETHICONE 120; 1200; 1200 MG/30ML; MG/30ML; MG/30ML
30 SUSPENSION ORAL EVERY 4 HOURS PRN
Status: DISCONTINUED | OUTPATIENT
Start: 2023-06-12 | End: 2023-06-14 | Stop reason: HOSPADM

## 2023-06-12 RX ORDER — FOLIC ACID 1 MG/1
1 TABLET ORAL DAILY
Status: DISCONTINUED | OUTPATIENT
Start: 2023-06-13 | End: 2023-06-14 | Stop reason: HOSPADM

## 2023-06-12 RX ORDER — TRAZODONE HYDROCHLORIDE 50 MG/1
50 TABLET, FILM COATED ORAL
Status: DISCONTINUED | OUTPATIENT
Start: 2023-06-12 | End: 2023-06-12

## 2023-06-12 RX ORDER — GABAPENTIN 100 MG/1
100 CAPSULE ORAL 3 TIMES DAILY
Status: DISCONTINUED | OUTPATIENT
Start: 2023-06-12 | End: 2023-06-12

## 2023-06-12 RX ORDER — OLANZAPINE 10 MG/2ML
10 INJECTION, POWDER, FOR SOLUTION INTRAMUSCULAR 3 TIMES DAILY PRN
Status: DISCONTINUED | OUTPATIENT
Start: 2023-06-12 | End: 2023-06-13

## 2023-06-12 RX ORDER — MULTIPLE VITAMINS W/ MINERALS TAB 9MG-400MCG
1 TAB ORAL DAILY
Status: DISCONTINUED | OUTPATIENT
Start: 2023-06-13 | End: 2023-06-12

## 2023-06-12 RX ORDER — OLANZAPINE 10 MG/1
10 TABLET ORAL 3 TIMES DAILY PRN
Status: DISCONTINUED | OUTPATIENT
Start: 2023-06-12 | End: 2023-06-13

## 2023-06-12 RX ORDER — MAGNESIUM HYDROXIDE/ALUMINUM HYDROXICE/SIMETHICONE 120; 1200; 1200 MG/30ML; MG/30ML; MG/30ML
30 SUSPENSION ORAL EVERY 4 HOURS PRN
Status: DISCONTINUED | OUTPATIENT
Start: 2023-06-12 | End: 2023-06-12

## 2023-06-12 RX ORDER — METOPROLOL TARTRATE 25 MG/1
25 TABLET, FILM COATED ORAL 2 TIMES DAILY
Status: DISCONTINUED | OUTPATIENT
Start: 2023-06-12 | End: 2023-06-12

## 2023-06-12 RX ORDER — MULTIPLE VITAMINS W/ MINERALS TAB 9MG-400MCG
1 TAB ORAL DAILY
Status: DISCONTINUED | OUTPATIENT
Start: 2023-06-13 | End: 2023-06-14 | Stop reason: HOSPADM

## 2023-06-12 RX ORDER — FOLIC ACID 1 MG/1
1 TABLET ORAL DAILY
Status: DISCONTINUED | OUTPATIENT
Start: 2023-06-13 | End: 2023-06-12

## 2023-06-12 RX ADMIN — DIAZEPAM 10 MG: 5 TABLET ORAL at 18:00

## 2023-06-12 RX ADMIN — DIAZEPAM 10 MG: 5 TABLET ORAL at 20:51

## 2023-06-12 RX ADMIN — ALUMINUM HYDROXIDE, MAGNESIUM HYDROXIDE, AND SIMETHICONE 30 ML: 200; 200; 20 SUSPENSION ORAL at 22:34

## 2023-06-12 RX ADMIN — DIAZEPAM 10 MG: 5 TABLET ORAL at 22:32

## 2023-06-12 ASSESSMENT — ACTIVITIES OF DAILY LIVING (ADL)
WEAR_GLASSES_OR_BLIND: YES
CONCENTRATING,_REMEMBERING_OR_MAKING_DECISIONS_DIFFICULTY: NO
VISION_MANAGEMENT: GLASSES
DOING_ERRANDS_INDEPENDENTLY_DIFFICULTY: NO
ADLS_ACUITY_SCORE: 33
TOILETING_ISSUES: NO
CHANGE_IN_FUNCTIONAL_STATUS_SINCE_ONSET_OF_CURRENT_ILLNESS/INJURY: NO
DRESSING/BATHING_DIFFICULTY: NO
DIFFICULTY_EATING/SWALLOWING: NO
ADLS_ACUITY_SCORE: 30
FALL_HISTORY_WITHIN_LAST_SIX_MONTHS: NO
WALKING_OR_CLIMBING_STAIRS_DIFFICULTY: NO
ADLS_ACUITY_SCORE: 35

## 2023-06-12 NOTE — ED TRIAGE NOTES
Patient reports she has been drinking every day for the past few weeks and been having high blood pressure when she does not drink. Patient reports drinking a 750 ml bottle of hard liquor a day. Patient reports her last drink being an hour before coming in. Patient denies seizure history.     Patient reports having been to detox before and verbalizes understanding that she will not have her phone on the unit and that it is a locked unit.

## 2023-06-12 NOTE — ED PROVIDER NOTES
History     Chief Complaint   Patient presents with     Alcohol Problem     Here to go to detox. 750 ml of hard liquor a day     HPI  Steph Drew is a 58 year old female with a past medical history of alcohol abuse who presents to the emergency department seeking detox placement.  The patient reports that she has been drinking up to 750 mL of hard liquor per day over the last several weeks.  Her last drink today was around 2 PM.  She reports she was last in detox in February of this year.  She denies any history of alcohol withdrawal seizures or DTs.  She reports she is otherwise healthy.  She does report that when she stops drinking, her blood pressure gets very high, which has been of significant concern to her.  She denies any suicidal or homicidal ideation.  The patient reports that she is starting to have some symptoms of alcohol withdrawal at this time.  No other acute physical symptoms.  She does smoke cigarettes and would like a nicotine patch if possible.  No other drug use reported.    I have reviewed the Medications, Allergies, Past Medical and Surgical History, and Social History in the [employer] system.    Past Medical History:   Diagnosis Date     Drug dependence (H)     2004, treatment at [facility]     History of other genital system and obstetric disorders     G2, P0-0-2-0     History of other infectious or parasitic disease     Childhood     Nondependent alcohol abuse, in remission     No Comments Provided     Past Surgical History:   Procedure Laterality Date     APPENDECTOMY OPEN      Age 17     TONSILLECTOMY, ADENOIDECTOMY, COMBINED      Age 8     No current facility-administered medications for this encounter.     Current Outpatient Medications   Medication     gabapentin (NEURONTIN) 100 MG capsule     hydrOXYzine (ATARAX) 25 MG tablet     metoprolol tartrate (LOPRESSOR) 25 MG tablet     PARoxetine (PAXIL) 30 MG tablet     traZODone (DESYREL) 50 MG tablet     folic acid (FOLVITE) 1 MG tablet  "    mirtazapine (REMERON) 15 MG tablet     multivitamin, therapeutic with minerals (THERA-VIT-M) TABS tablet     naltrexone (DEPADE/REVIA) 50 MG tablet     nicotine (NICODERM CQ) 21 MG/24HR 24 hr patch     nicotine polacrilex (NICORETTE) 4 MG gum     thiamine (B-1) 100 MG tablet     No Known Allergies  Past medical history, past surgical history, medications, and allergies were reviewed with the patient. Additional pertinent items: None    Social History     Socioeconomic History     Marital status:      Spouse name: Not on file     Number of children: Not on file     Years of education: Not on file     Highest education level: Not on file   Occupational History     Not on file   Tobacco Use     Smoking status: Every Day     Packs/day: 1.00     Types: Cigarettes     Smokeless tobacco: Never   Vaping Use     Vaping status: Not on file   Substance and Sexual Activity     Alcohol use: Yes     Drug use: Yes     Types: Marijuana     Sexual activity: Not on file   Other Topics Concern     Not on file   Social History Narrative    Currently not working.  Inpatient detoxification in fall of 2007.  Subsequent stay at [facility].  Current adult foster care situation.       Cigarettes - One pack per day, formerly three packs per day.     Social Determinants of Health     Financial Resource Strain: Not on file   Food Insecurity: Not on file   Transportation Needs: Not on file   Physical Activity: Not on file   Stress: Not on file   Social Connections: Not on file   Intimate Partner Violence: Not on file   Housing Stability: Not on file     Social history was reviewed with the patient. Additional pertinent items: None    Review of Systems  A medically appropriate review of systems was performed with pertinent positives and negatives noted in the HPI, and all other systems negative.    Physical Exam   BP: (!) 139/98  Pulse: 82  Temp: 98.9  F (37.2  C)  Resp: 18  Height: 167.6 cm (5' 6\")  Weight: 84.8 kg (187 " lb)  SpO2: 96 %      General: Well nourished, well developed, NAD  HEENT: EOMI, anicteric. NCAT, MMM  Neck: no jugular venous distension, supple, nl ROM  Cardiac: Regular rate and rhythm. No murmurs, rubs, or gallops. Normal S1, S2.  Intact peripheral pulses  Pulm: CTAB, no stridor, wheezes, rales, rhonchi  Skin: Warm and dry to the touch.  No rash  Extremities: No LE edema, no cyanosis, w/w/p  Neuro: A&Ox3, no gross focal deficits.  Steady gait, clear speech    ED Course        Procedures                           Labs Ordered and Resulted from Time of ED Arrival to Time of ED Departure   COMPREHENSIVE METABOLIC PANEL - Abnormal       Result Value    Sodium 136      Potassium 3.6      Chloride 98      Carbon Dioxide (CO2) 20 (*)     Anion Gap 18 (*)     Urea Nitrogen 15.0      Creatinine 0.84      Calcium 9.0      Glucose 128 (*)     Alkaline Phosphatase 100      AST 34      ALT 29      Protein Total 7.5      Albumin 4.5      Bilirubin Total 0.5      GFR Estimate 80     ETHYL ALCOHOL LEVEL - Abnormal    Alcohol ethyl 0.05 (*)    DRUG ABUSE SCREEN 1 URINE (ED) - Abnormal    Amphetamines Urine Screen Negative      Barbituates Urine Screen Negative      Benzodiazepine Urine Screen Negative      Cannabinoids Urine Screen Positive (*)     Cocaine Urine Screen Negative      Opiates Urine Screen Negative     COVID-19 VIRUS (CORONAVIRUS) BY PCR - Normal    SARS CoV2 PCR Negative     CBC WITH PLATELETS AND DIFFERENTIAL    WBC Count 10.5      RBC Count 5.11      Hemoglobin 15.2      Hematocrit 42.9      MCV 84      MCH 29.7      MCHC 35.4      RDW 11.7      Platelet Count 308      % Neutrophils 50      % Lymphocytes 40      % Monocytes 7      % Eosinophils 2      % Basophils 1      % Immature Granulocytes 0      NRBCs per 100 WBC 0      Absolute Neutrophils 5.3      Absolute Lymphocytes 4.2      Absolute Monocytes 0.7      Absolute Eosinophils 0.2      Absolute Basophils 0.1      Absolute Immature Granulocytes 0.0       Absolute NRBCs 0.0              Results for orders placed or performed during the hospital encounter of 06/12/23 (from the past 24 hour(s))   CBC with platelets differential    Narrative    The following orders were created for panel order CBC with platelets differential.  Procedure                               Abnormality         Status                     ---------                               -----------         ------                     CBC with platelets and d...[[id number]]                      Final result                 Please view results for these tests on the individual orders.   Comprehensive metabolic panel   Result Value Ref Range    Sodium 136 136 - 145 mmol/L    Potassium 3.6 3.4 - 5.3 mmol/L    Chloride 98 98 - 107 mmol/L    Carbon Dioxide (CO2) 20 (L) 22 - 29 mmol/L    Anion Gap 18 (H) 7 - 15 mmol/L    Urea Nitrogen 15.0 6.0 - 20.0 mg/dL    Creatinine 0.84 0.51 - 0.95 mg/dL    Calcium 9.0 8.6 - 10.0 mg/dL    Glucose 128 (H) 70 - 99 mg/dL    Alkaline Phosphatase 100 35 - 104 U/L    AST 34 10 - 35 U/L    ALT 29 10 - 35 U/L    Protein Total 7.5 6.4 - 8.3 g/dL    Albumin 4.5 3.5 - 5.2 g/dL    Bilirubin Total 0.5 <=1.2 mg/dL    GFR Estimate 80 >60 mL/min/1.73m2   Ethyl Alcohol Level   Result Value Ref Range    Alcohol ethyl 0.05 (H) <=0.01 g/dL   CBC with platelets and differential   Result Value Ref Range    WBC Count 10.5 4.0 - 11.0 10e3/uL    RBC Count 5.11 3.80 - 5.20 10e6/uL    Hemoglobin 15.2 11.7 - 15.7 g/dL    Hematocrit 42.9 35.0 - 47.0 %    MCV 84 78 - 100 fL    MCH 29.7 26.5 - 33.0 pg    MCHC 35.4 31.5 - 36.5 g/dL    RDW 11.7 10.0 - 15.0 %    Platelet Count 308 150 - 450 10e3/uL    % Neutrophils 50 %    % Lymphocytes 40 %    % Monocytes 7 %    % Eosinophils 2 %    % Basophils 1 %    % Immature Granulocytes 0 %    NRBCs per 100 WBC 0 <1 /100    Absolute Neutrophils 5.3 1.6 - 8.3 10e3/uL    Absolute Lymphocytes 4.2 0.8 - 5.3 10e3/uL    Absolute Monocytes 0.7 0.0 - 1.3 10e3/uL    Absolute  Eosinophils 0.2 0.0 - 0.7 10e3/uL    Absolute Basophils 0.1 0.0 - 0.2 10e3/uL    Absolute Immature Granulocytes 0.0 <=0.4 10e3/uL    Absolute NRBCs 0.0 10e3/uL   Asymptomatic COVID-19 Virus (Coronavirus) by PCR Nasopharyngeal    Specimen: Nasopharyngeal; Swab   Result Value Ref Range    SARS CoV2 PCR Negative Negative    Narrative    Testing was performed using the [employer] Xpress SARS-CoV-2 Assay on the Cepheid Gene-Xpert Instrument Systems. Additional information about this Emergency Use Authorization (EUA) assay can be found via the Lab Guide. This test should be ordered for the detection of SARS-CoV-2 in individuals who meet SARS-CoV-2 clinical and/or epidemiological criteria as well as from individuals without symptoms or other reasons to suspect COVID-19. Test performance for asymptomatic patients has only been established in anterior nasal swab specimens. This test is for in vitro diagnostic use under the FDA EUA for laboratories certified under CLIA to perform high complexity testing. This test has not been FDA cleared or approved. A negative result does not rule out the presence of PCR inhibitors in the specimen or target RNA concentration below the limit of detection for the assay. The possibility of a false negative should be considered if the patient's recent exposure or clinical presentation suggests COVID-19. This test was validated by the [facility]. This laboratory is certified under the Clinical Laboratory Improvement Amendments (CLIA) as qualified to perform high complexity laboratory testing.     Urine Drugs of Abuse Screen    Narrative    The following orders were created for panel order Urine Drugs of Abuse Screen.  Procedure                               Abnormality         Status                     ---------                               -----------         ------                     Drug abuse screen 1 urin...[[id number]]  Abnormal            Final result                  Please view results for these tests on the individual orders.   Drug abuse screen 1 urine (ED)   Result Value Ref Range    Amphetamines Urine Screen Negative Screen Negative    Barbituates Urine Screen Negative Screen Negative    Benzodiazepine Urine Screen Negative Screen Negative    Cannabinoids Urine Screen Positive (A) Screen Negative    Cocaine Urine Screen Negative Screen Negative    Opiates Urine Screen Negative Screen Negative       Labs, vital signs, and imaging studies were reviewed by me.    Medications   diazepam (VALIUM) tablet 5-20 mg (10 mg Oral $Given 6/12/23 1800)   nicotine Patch in Place (has no administration in time range)   nicotine (NICODERM CQ) 21 MG/24HR 24 hr patch 1 patch (1 patch Transdermal Not Given 6/12/23 1801)       Assessments & Plan (with Medical Decision Making)   Steph Drew is a 58 year old female who presents to the emergency department seeking detox placement.    Patient was discussed with detox intake, they do have a bed available for the patient.  Medical clearance labs were ordered.  Patient was placed on MSSA protocol.  A nicotine patch was ordered for the patient per her request.    Laboratory work-up is remarkable for alcohol level 0.05, negative COVID testing, urine drug screen positive for cannabinoids, otherwise negative.  Anion gap slightly elevated at 18.  Patient is able to tolerate p.o. in the emergency department.    I have reviewed the nursing notes.    I have reviewed the findings, diagnosis, plan and need for follow up with the patient.    Critical care was not performed.     Medical Decision Making  The patient's presentation was of high complexity (an acute health issue posing potential threat to life or bodily function).    The patient's evaluation involved:  review of external note(s) from 1 sources (Notes from last detox admission 2/3/2023)  ordering and/or review of 3+ test(s) in this encounter (see separate area of note for  details)  review of 3+ test result(s) ordered prior to this encounter (Previous labs for comparison)  discussion of management or test interpretation with another health professional (Detox intake)    The patient's management necessitated moderate risk (prescription drug management including medications given in the ED), moderate risk (limitations due to social determinants of health (Alcohol abuse)) and high risk (a decision regarding hospitalization).    Patient and their further management were discussed with detox intake, to be admitted to their service. Plan was discussed with patient who understands and agrees with plan.    New Prescriptions    No medications on file       Final diagnoses:   Alcohol abuse       DIPTI NIX MD  6/12/2023   [facility] EMERGENCY DEPARTMENT     Dipti Nix MD  06/12/23 9302

## 2023-06-13 LAB
CHOLEST SERPL-MCNC: 151 MG/DL
FOLATE SERPL-MCNC: 9.8 NG/ML (ref 4.6–34.8)
GGT SERPL-CCNC: 48 U/L (ref 5–36)
HBA1C MFR BLD: 5.1 %
HDLC SERPL-MCNC: 47 MG/DL
LDLC SERPL CALC-MCNC: ABNORMAL MG/DL
NONHDLC SERPL-MCNC: 104 MG/DL
TRIGL SERPL-MCNC: 524 MG/DL
TSH SERPL DL<=0.005 MIU/L-ACNC: 3.28 UIU/ML (ref 0.3–4.2)
VIT B12 SERPL-MCNC: 497 PG/ML (ref 232–1245)

## 2023-06-13 PROCEDURE — 250N000013 HC RX MED GY IP 250 OP 250 PS 637: Performed by: PSYCHIATRY & NEUROLOGY

## 2023-06-13 PROCEDURE — 128N000004 HC R&B CD ADULT

## 2023-06-13 PROCEDURE — 84443 ASSAY THYROID STIM HORMONE: CPT | Performed by: PSYCHIATRY & NEUROLOGY

## 2023-06-13 PROCEDURE — 82977 ASSAY OF GGT: CPT | Performed by: PSYCHIATRY & NEUROLOGY

## 2023-06-13 PROCEDURE — H2032 ACTIVITY THERAPY, PER 15 MIN: HCPCS

## 2023-06-13 PROCEDURE — 99223 1ST HOSP IP/OBS HIGH 75: CPT | Mod: AI | Performed by: PSYCHIATRY & NEUROLOGY

## 2023-06-13 PROCEDURE — 99221 1ST HOSP IP/OBS SF/LOW 40: CPT | Performed by: PHYSICIAN ASSISTANT

## 2023-06-13 PROCEDURE — 82607 VITAMIN B-12: CPT | Performed by: PSYCHIATRY & NEUROLOGY

## 2023-06-13 PROCEDURE — 80061 LIPID PANEL: CPT | Performed by: PSYCHIATRY & NEUROLOGY

## 2023-06-13 PROCEDURE — 82746 ASSAY OF FOLIC ACID SERUM: CPT | Performed by: PSYCHIATRY & NEUROLOGY

## 2023-06-13 PROCEDURE — HZ2ZZZZ DETOXIFICATION SERVICES FOR SUBSTANCE ABUSE TREATMENT: ICD-10-PCS | Performed by: PSYCHIATRY & NEUROLOGY

## 2023-06-13 PROCEDURE — 250N000013 HC RX MED GY IP 250 OP 250 PS 637: Performed by: PHYSICIAN ASSISTANT

## 2023-06-13 PROCEDURE — 83036 HEMOGLOBIN GLYCOSYLATED A1C: CPT | Performed by: PSYCHIATRY & NEUROLOGY

## 2023-06-13 PROCEDURE — 36415 COLL VENOUS BLD VENIPUNCTURE: CPT | Performed by: PSYCHIATRY & NEUROLOGY

## 2023-06-13 RX ORDER — TRAZODONE HYDROCHLORIDE 50 MG/1
50 TABLET, FILM COATED ORAL
Status: ON HOLD | COMMUNITY
End: 2023-06-14

## 2023-06-13 RX ORDER — MIRTAZAPINE 45 MG/1
45 TABLET, FILM COATED ORAL AT BEDTIME
Status: ON HOLD | COMMUNITY
End: 2023-06-14

## 2023-06-13 RX ORDER — GABAPENTIN 300 MG/1
300 CAPSULE ORAL 3 TIMES DAILY
Status: ON HOLD | COMMUNITY
End: 2023-06-14

## 2023-06-13 RX ORDER — HYDROXYZINE HYDROCHLORIDE 25 MG/1
25 TABLET, FILM COATED ORAL 3 TIMES DAILY
Status: ON HOLD | COMMUNITY
End: 2023-06-14

## 2023-06-13 RX ORDER — PAROXETINE 30 MG/1
30 TABLET, FILM COATED ORAL EVERY MORNING
Status: ON HOLD | COMMUNITY
End: 2023-06-14

## 2023-06-13 RX ORDER — MULTIPLE VITAMINS W/ MINERALS TAB 9MG-400MCG
1 TAB ORAL DAILY
Status: ON HOLD | COMMUNITY
End: 2023-06-14

## 2023-06-13 RX ORDER — CLONIDINE HYDROCHLORIDE 0.1 MG/1
0.1 TABLET ORAL 3 TIMES DAILY PRN
Status: DISCONTINUED | OUTPATIENT
Start: 2023-06-13 | End: 2023-06-14 | Stop reason: HOSPADM

## 2023-06-13 RX ORDER — GABAPENTIN 300 MG/1
300 CAPSULE ORAL 3 TIMES DAILY PRN
Status: DISCONTINUED | OUTPATIENT
Start: 2023-06-13 | End: 2023-06-14 | Stop reason: HOSPADM

## 2023-06-13 RX ADMIN — PAROXETINE HYDROCHLORIDE 30 MG: 20 TABLET, FILM COATED ORAL at 08:40

## 2023-06-13 RX ADMIN — NICOTINE 1 PATCH: 21 PATCH, EXTENDED RELEASE TRANSDERMAL at 08:41

## 2023-06-13 RX ADMIN — GABAPENTIN 300 MG: 300 CAPSULE ORAL at 20:30

## 2023-06-13 RX ADMIN — MULTIPLE VITAMINS W/ MINERALS TAB 1 TABLET: TAB at 08:40

## 2023-06-13 RX ADMIN — DIAZEPAM 5 MG: 5 TABLET ORAL at 00:27

## 2023-06-13 RX ADMIN — THIAMINE HCL TAB 100 MG 100 MG: 100 TAB at 08:40

## 2023-06-13 RX ADMIN — CLONIDINE HYDROCHLORIDE 0.1 MG: 0.1 TABLET ORAL at 10:51

## 2023-06-13 RX ADMIN — FOLIC ACID 1 MG: 1 TABLET ORAL at 08:40

## 2023-06-13 RX ADMIN — MIRTAZAPINE 45 MG: 45 TABLET, FILM COATED ORAL at 22:01

## 2023-06-13 RX ADMIN — DIAZEPAM 10 MG: 5 TABLET ORAL at 05:01

## 2023-06-13 ASSESSMENT — ACTIVITIES OF DAILY LIVING (ADL)
HYGIENE/GROOMING: INDEPENDENT
ADLS_ACUITY_SCORE: 30
ORAL_HYGIENE: INDEPENDENT
ADLS_ACUITY_SCORE: 30
DRESS: INDEPENDENT
ADLS_ACUITY_SCORE: 30
ADLS_ACUITY_SCORE: 30

## 2023-06-13 NOTE — PROGRESS NOTES
"3AW Admission Note    S = Situation:   Steph Drew is a 58 year old year old female with a chief complaint of Alcohol Problem (Here to go to detox. 750 ml of hard liquor a day)    Voluntary admission to [facility] for alcohol withdrawal and detox.    B  = Background:   Substance use history: pr reports drinking up to 750ml of vodka for several weeks. States she has been drinking on and off since September 2022.   Last drink around 2 PM today. Pt was last in dotox in Feb of this here on 3A. Reports she has been to treatment for alcohol many times, at least 10 times. Denies withdrawal seizures or DTs.       Mental health history: pt reports hx of depression.    Medical history: denies any acute medical issues     Legal history: voluntary    Treatment history: pt states she has been to treatment many times and is currently in a virtual program with [facility]     A  =  Assessment:   During admission interview, pt affect was flat but she was pleasant and cooperative. C/o heart burn, endorsed mild tremors, sweats and pulse in the 90's.   MSSA 9. Prn valium and maalox given. Refused PTA meds stating she has not been taking them and she had heart burn.    BP (!) 140/100   Pulse 92   Temp 98.1  F (36.7  C)   Resp 18   Ht 1.676 m (5' 6\")   Wt 84.8 kg (187 lb)   SpO2 97%   BMI 30.18 kg/m       R =   Request or Recommendation:   Alcohol withdrawal will be monitored and treated using MSSA with valium prn  Pt will meet with psychiatry, internal medicine, and case management tomorrow.  At the time of admission, pt reports discharge plan is to return her virtual treatment program with [facility] because she likes her Counsellor.     "

## 2023-06-13 NOTE — PROGRESS NOTES
"Triage & Transition Services, [address]     Steph Drew  June 13, 2023    PLAN: Patient to return to Outpatient Treatment program at [facility] if possible, otherwise open to alternative treatment options. Patient to follow-up with Psychiatry and Therapy, as well as [facility] or Case Management supports to be scheduled/referred prior to discharge.     Steph is currently admitted to [address]. Please see H&P for complete assessment information and therapist Progress Notes for additional clinical details.      worked with ECHO Goldstein regarding patient's care today.     Met with patient to coordinate details of aftercare planning in-person.     The patient stated they would like to go back to their Outpatient Treatment program at [facility] which they stated was virtual. The patient stated they were questioning whether their Psychiatrist would think virtual was enough, but that they themselves felt it was enough. This writer asked the patient what made them question this or ask this if they felt it was enough and the patient explored this, stating they felt they had enough support, because their Outpatient program is three days a week, that they have virtual group meetings and also therapy with their counselor through the program, Zara.     The patient stated if they were unable to return to [facility], they would like to go to [facility], which they stated \"may be closed until September.\"     The patient stated they are interested in Case Management and struggled to follow through but had a referral already to [facility] Case Management services or [facility] services. They stated they would like this writer to follow up on this.    The patient stated they would be interested in Case Management or treatment through [facility] if they were unable to participate in services through [facility].     The patient stated interest in [facility] and asked this writer for assistance " "in connecting to a group facilitator or getting registered for group meetings if possible.     The patient provided their schedule for this writer to schedule them Psychiatry and Therapy with both their current providers.     The patient discussed their difficulties coordinating and following through with phone calls, meetings, and scheduling intakes throughout their life \"not even just when drinking\" and this writer empathized with this. This writer encouraged the patient to discuss this with their Therapist and Psychiatrist if they hadn't already, and explained to them the possibility of executive functioning difficulties and how there are resources to learn how to better work with their strengths and needs for additional support around this, noting I am not a therapist. The patient stated they appreciated my encouragement to communicate her feelings around this to her Physician and Therapist.     Healthcare funding: [employer] [facility] Open Access. Barriers to care related to funding? None. Steps taken toward reducing barriers to care: Received call from [facility] . They stated they had spoken with the patient on their way to the hospital and that they were calling to provide support in follow-up for any care plans made while the patient was on the unit. They provided a call-back number of [phone]. Anitha also asked if the patient was interested in treatment here at [place] and wanted confirmation regarding the patient's location if in .     This writer called Anitha back and left a voicemail, providing call back information.     Clinical care team and client have agreed on an aftercare plan that includes the following level of care at discharge: Return to Outpatient Treatment Program with Therapy and Psychiatry follow-up scheduled prior to discharge. Patient is also encouraged to utilize Case Management supports if they find this beneficial.      Referrals offered to " patient: [facility], [facility] and Case Management. Patient working with Balb[facility] already at this time and may be scheduled for intake.     completed the following coordination steps with referrals/outpatient providers:    Obtained Release of Information forms from the patient for Zara, the patient's Therapist at [facility] at [phone], [facility]/Case Management at [phone], Anitha, patient's  from [facility] at [phone], and Dr. Dleia Casarez patient's Psychiatrist at [facility] [phone].    Roxie Corcoran  Triage & Transition Services - Mental Health and Addiction Service Line  [facility] - Adult Inpatient Addiction Psychiatry Unit

## 2023-06-13 NOTE — PROGRESS NOTES
06/12/23 2201   Patient Belongings   Did you bring any home meds/supplements to the hospital?  Yes   Disposition of meds  Other (see comment)  (med room)   Patient Belongings other (see comments)  (storage bin, med room bin)   Belongings Search Yes   Clothing Search Yes   Second Staff Gurdeep     Storage bin: purse, lighter  Med room bin: cell phone, env # [phone]: visa, env #[id number]: meds from home  A               Admission:  I am responsible for any personal items that are not sent to the safe or pharmacy.  [facility] is not responsible for loss, theft or damage of any property in my possession.    Signature:  _________________________________ Date: _______  Time: _____                                              Staff Signature:  ____________________________ Date: ________  Time: _____      2nd Staff person, if patient is unable/unwilling to sign:    Signature: ________________________________ Date: ________  Time: _____     Discharge:  [facility] has returned all of my personal belongings:    Signature: _________________________________ Date: ________  Time: _____                                          Staff Signature:  ____________________________ Date: ________  Time: _____
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
## 2023-06-13 NOTE — PLAN OF CARE
Problem: Alcohol Withdrawal  Goal: Alcohol Withdrawal Symptom Control  Outcome: Progressing   Goal Outcome Evaluation:           Pt is admitted for alcohol detox on MSSA with valium.    0000  Pt is endorsing mild tremors and sweats. Sleeping fine, no pain or discomfort.   MSSA 8, 5mg valium given at 0025.  B/P: 159/98, T: 98.2, P: 72, R: 18       0400   vitals: B/P: 115/73, T: 97.8, P: 75, R: 18  Pt scored 9, symptoms are moderate, lots of sweating, anxiety with mild tremors.  Received prn valium 10mg at this time.

## 2023-06-13 NOTE — PHARMACY-ADMISSION MEDICATION HISTORY
Pharmacist Admission Medication History    Admission medication history is complete. The information provided in this note is only as accurate as the sources available at the time of the update.    Medication reconciliation/reorder completed by provider prior to medication history? Yes    Information Source(s): Patient via phone    Pertinent Information:   - Patient states hydroxyzine was recently changed from PRN to scheduled   - Patient states she was recently prescribed clonidine, but had not started taking it yet     Changes made to PTA medication list:    Added: None    Deleted:   o Folic acid, thiamine   o Metoprolol 25 mg BID - not taking recently   o Nicotine patches, gum   o Naltrexone 50 mg daily - discontinued due to side effects    Changed:   o Gabapentin 100-300 mg TID -> 300 mg TID   o Mirtazapine 15 mg -> 45 mg   o Hydroxyzine 25-50 mg Q4h PRN -> 25 mg TID      Allergies reviewed with patient and updates made in EHR: yes    Medication History Completed By: ANTONIO YOUNG RP 6/13/2023 2:28 PM    Prior to Admission medications    Medication Sig Last Dose Taking? Auth Provider Long Term End Date   gabapentin (NEURONTIN) 300 MG capsule Take 300 mg by mouth 3 times daily Past Week Yes Unknown, Entered By History No    hydrOXYzine (ATARAX) 25 MG tablet Take 25 mg by mouth 3 times daily Past Week Yes Unknown, Entered By History     mirtazapine (REMERON) 45 MG tablet Take 45 mg by mouth At Bedtime Past Week Yes Unknown, Entered By History No    PARoxetine (PAXIL) 30 MG tablet Take 30 mg by mouth every morning  Yes Unknown, Entered By History No    traZODone (DESYREL) 50 MG tablet Take 50 mg by mouth nightly as needed for sleep Past Week Yes Unknown, Entered By History No

## 2023-06-13 NOTE — PLAN OF CARE
Problem: Sleep Disturbance  Goal: Adequate Sleep/Rest  Outcome: Progressing   Goal Outcome Evaluation:       Patient was admitted on the night shift she is here for alcohol withdrawal. On this  shift, patient  was alert and oriented with  stable vitals. MSSA score was 4 and 3. metoprolol scheduled for this shift was not available to be given to patient. Called pharmacy, writer was told that patient was not taking metoprolol at home, it needs to be verified. She also has an order for gabapentin pharmacy still working on that.   Patient blood pressure in the morning was 168/107 PRN clonidine was given to patient. Last blood pressure taken was 139/90. Writer did notified medicine. Patient denied all mental  health symptoms. No alcohol withdrawal symptoms noted.

## 2023-06-13 NOTE — PROGRESS NOTES
06/13/23 1800   Group Therapy Session   Group Attendance attended group session   Time Session Began 1645   Time Session Ended 1730   Total Time (minutes) 45   Total # Attendees 8   Group Type recreation   Group Topic Covered coping skills/lifestyle management   Group Session Detail healthy coping skills   Patient Response/Contribution cooperative with task   Patient Participation Detail Pt participated in Therapeutic Recreation group with focus on leisure education and acquisition of knowledge and skills. Pt was fully engaged and cooperative in group recreational intervention; leisure inventory. Pt was focused on the written portion for the first part of group and then contributed to the group discussion following. Pt discussed several recreational interests and positive coping skills that are healthy outlets. Pt mood was calm and was appropriate with interactions.

## 2023-06-13 NOTE — TELEPHONE ENCOUNTER
S: [facility] , Provider Dinah calling at 7:05PM with clinical on a 58 year old/Female presenting for alcohol detox.     B: Pt presents for ETOH detox.   Currently reports drinking 750 ML of liquor a day for the past several weeks.    Patient reports last use was prior to arrival.  Pt HA: 0.05  Pt  denies hx of DT  Pt  denies hx of seizures. Last seizure: N/A  Pt endorsing the following symptoms of withdrawal: Hypertensive  MSSA Score: None currently    Pt denies acute mental health or medical concerns.   Pt denies other substance use    Does Pt have a detox care plan in [facility]? No  Does pt present with specific needs, assistive devices, or exclusionary criteria? None  Is the patient ambulating, eating and drinking in the ED? Yes    A: Pt meets criteria to be presented for IP detox admission. Patient is voluntary  COVID: Negative  Utox: Positive for cannabis  CMP: Abnormalities: CO2 20, Anion Gap 18, Glucose 128  CBC: WNL  HCG: N/A     R: Patient cleared and ready for behavioral bed placement: Yes    Pt is meeting criteria for presentation to 3A/CD     R: 8:02PM Dr Lopes accepts pt for 3A/[facility] CD    8:05PM pt added to unit queue, unit called with disposition    ED updated with placement

## 2023-06-13 NOTE — PROGRESS NOTES
Behavioral Team Discussion: (6/13/2023)    Continued Stay Criteria/Rationale: Patient admitted for Alcohol  Use Disorder.  Plan: The following services will be provided to the patient; psychiatric assessment, medication management, therapeutic milieu, individual and group support, and skills groups.   Participants: 3A Provider: Dr. Tiff Mccauley MD; 3A RN: Jono Peralta RN; 3A CM's: [facility].  Summary/Recommendation: Providers will assess today for treatment recommendations, discharge planning, and aftercare plans. [facility]  CM will meet with pt for discharge planning.   Medical/Physical: High Blood pressure   Precautions: None   Behavioral Orders   Procedures     Code 1 - Restrict to Unit     Routine Programming     As clinically indicated     Status 15     Every 15 minutes.     Withdrawal precautions     Rationale for change in precautions or plan: N/A  Progress: No Change.    ASAM Dimension Scale Ratings:  Dimension 1: 3 Client tolerates and sb with withdrawal discomfort poorly. Client has severe intoxication, such that the client endangers self or others, or intoxication has not abated with less intensive levels of services. Client displays severe signs and symptoms; or risk of severe, but manageable withdrawal; or withdrawal worsening despite detox at less intensive level.  Dimension 2: 1 Client tolerates and sb with physical discomfort and is able to get the services that the client needs.  Dimension 3: 2 Client has difficulty with impulse control and lacks coping skills. Client has thoughts of suicide or harm to others without means; however, the thoughts may interfere with participation in some treatment activities. Client has difficulty functioning in significant life areas. Client has moderate symptoms of emotional, behavioral, or cognitive problems. Client is able to participate in most treatment activities.  Dimension 4: 3 Client displays inconsistent compliance,  minimal awareness of either the client's addiction or mental disorder, and is minimally cooperative.  Dimension 5: 3 Client has poor recognition and understanding of relapse and recidivism issues and displays moderately high vulnerability for further substance use or mental health problems. Client has few coping skills and rarely applies coping skills.  Dimension 6: 3 Client is not engaged in structured, meaningful activity and the client's peers, family, significant other, and living environment are unsupportive, or there is significant criminal justice system involvement.

## 2023-06-13 NOTE — CONSULTS
[facility]  Consult Note - Hospitalist Service  Date of Admission:  6/12/2023  Consult Requested by: Dr. Lopes  Reason for Consult: General medical evaluation    Assessment & Plan   Steph Drew is a 58 year old woman with a history of hypertension, MDD, MASOOD, alcohol use disorder, who was admitted to inpatient behavioral health for detoxification from alcohol.     Alcohol withdrawal   Alcohol use disorder  Drinking 750 mL per day. Last drink on 6/12. No history of withdrawal seizures or DTs.    - Continue on MSSA protocol with benzodiazepines as indicated   - Seizure precautions   - Management per Psychiatry   - Agree with MVI, folic acid, and thiamine supplements   - Notify medicine for SBP >180 or DBP >110    Hypertension: BP elevated since admission in the setting of alcohol withdrawal.   - Continue PTA metoprolol 25 mg BID   - Clonidine 0.1 mg TID PRN for SBP >170    At this time she is medically stable and Medicine will sign off. Please do not hesitate to contact if new questions or concerns arise.       Kim Billings PA-C  Hospitalist Service  Securely message with Vocera (more info)  Text page via [facility] Paging/Directory   ______________________________________________________________________    Chief Complaint   Alcohol withdrawal     History is obtained from the patient    History of Present Illness   Steph Drew is a 58 year old woman with a history of hypertension, MDD, MASOOD, alcohol use disorder, who was admitted to inpatient behavioral health for detoxification from alcohol. She has been drinking about 750 mL of hard liquor daily. Her last drink was yesterday. No history of withdrawal seizures or DTs. No other substance use. Currently feeling a bit anxious but having mild withdrawal symptoms overall. Slept well last night. Eating and drinking without issue. No chest pain, dyspnea, abdominal pain, nausea, vomiting, fevers.       Past Medical  History    Past Medical History:   Diagnosis Date     Drug dependence (H)     2004, treatment at [facility]     History of other genital system and obstetric disorders     G2, P0-0-2-0     History of other infectious or parasitic disease     Childhood     Nondependent alcohol abuse, in remission     No Comments Provided       Past Surgical History   Past Surgical History:   Procedure Laterality Date     APPENDECTOMY OPEN      Age 17     TONSILLECTOMY, ADENOIDECTOMY, COMBINED      Age 8       Medications   Medications Prior to Admission   Medication Sig Dispense Refill Last Dose     gabapentin (NEURONTIN) 100 MG capsule Take 1-3 capsules (100-300 mg) by mouth 3 times daily 270 capsule 0      hydrOXYzine (ATARAX) 25 MG tablet Take 25-50 mg by mouth every 4 hours as needed for anxiety        metoprolol tartrate (LOPRESSOR) 25 MG tablet Take 25 mg by mouth 2 times daily        PARoxetine (PAXIL) 30 MG tablet Take 1 tablet (30 mg) by mouth daily 30 tablet 0      traZODone (DESYREL) 50 MG tablet Take 1 tablet (50 mg) by mouth nightly as needed for sleep 10 tablet 0      folic acid (FOLVITE) 1 MG tablet Take 1 tablet (1 mg) by mouth daily 30 tablet 0      mirtazapine (REMERON) 15 MG tablet Take 1 tablet (15 mg) by mouth At Bedtime (Patient taking differently: Take 45 mg by mouth At Bedtime) 30 tablet 0      multivitamin, therapeutic with minerals (THERA-VIT-M) TABS tablet Take 1 tablet by mouth daily 30 each 0      naltrexone (DEPADE/REVIA) 50 MG tablet Take 1 tablet (50 mg) by mouth daily 30 tablet 0      nicotine (NICODERM CQ) 21 MG/24HR 24 hr patch Place 1 patch onto the skin daily 30 patch 3      nicotine polacrilex (NICORETTE) 4 MG gum Place 1-2 each (4-8 mg) inside cheek every hour as needed for other (nicotine withdrawal symptoms) 270 each 3      thiamine (B-1) 100 MG tablet Take 1 tablet (100 mg) by mouth daily 30 tablet 0           Review of Systems    The 10 point Review of Systems is negative other than noted in  [facility] or here.      Physical Exam   Vital Signs: Temp: 97  F (36.1  C) Temp src: Temporal BP: (!) 168/107 Pulse: 82   Resp: 16 SpO2: 100 % O2 Device: None (Room air)    Weight: 187 lbs 0 oz    Constitutional: Awake and alert, in no apparent distress.   Eyes: Sclera clear, anicteric   Respiratory: Breathing non-labored. CTAB.   Cardiovascular:  RRR, normal S1/S2. No rubs or murmurs.   GI: Soft, non-tender, non-distended.  Normoactive bowel sounds.   Skin:  Good color. No jaundice. No visible rashes, lesions, or bruising of concern.       Medical Decision Making       50 MINUTES SPENT BY ME on the date of service doing chart review, history, exam, documentation & further activities per the note.      Data   ------------------------- PAST 24 HR DATA REVIEWED -----------------------------------------------    I have personally reviewed the following data over the past 24 hrs:    10.5  \   15.2   / 308     136 98 15.0 /  128 (H)   3.6 20 (L) 0.84 \       ALT: 29 AST: 34 AP: 100 TBILI: 0.5   ALB: 4.5 TOT PROTEIN: 7.5 LIPASE: N/A       TSH: 3.28 T4: N/A A1C: 5.1       Imaging results reviewed over the past 24 hrs:   No results found for this or any previous visit (from the past 24 hour(s)).

## 2023-06-14 VITALS
OXYGEN SATURATION: 96 % | WEIGHT: 187 LBS | HEART RATE: 86 BPM | HEIGHT: 66 IN | TEMPERATURE: 97.1 F | BODY MASS INDEX: 30.05 KG/M2 | SYSTOLIC BLOOD PRESSURE: 178 MMHG | RESPIRATION RATE: 16 BRPM | DIASTOLIC BLOOD PRESSURE: 114 MMHG

## 2023-06-14 PROCEDURE — 250N000013 HC RX MED GY IP 250 OP 250 PS 637: Performed by: PHYSICIAN ASSISTANT

## 2023-06-14 PROCEDURE — 250N000013 HC RX MED GY IP 250 OP 250 PS 637: Performed by: PSYCHIATRY & NEUROLOGY

## 2023-06-14 PROCEDURE — 99239 HOSP IP/OBS DSCHRG MGMT >30: CPT | Performed by: PSYCHIATRY & NEUROLOGY

## 2023-06-14 RX ORDER — DISULFIRAM 250 MG/1
250 TABLET ORAL DAILY
Status: DISCONTINUED | OUTPATIENT
Start: 2023-06-14 | End: 2023-06-14 | Stop reason: HOSPADM

## 2023-06-14 RX ORDER — CLONIDINE HYDROCHLORIDE 0.1 MG/1
0.05 TABLET ORAL 2 TIMES DAILY
Status: ON HOLD | COMMUNITY
Start: 2023-06-14 | End: 2024-09-17

## 2023-06-14 RX ORDER — MIRTAZAPINE 45 MG/1
45 TABLET, FILM COATED ORAL AT BEDTIME
Qty: 90 TABLET | Refills: 0 | Status: SHIPPED | OUTPATIENT
Start: 2023-06-14

## 2023-06-14 RX ORDER — PAROXETINE 30 MG/1
30 TABLET, FILM COATED ORAL DAILY
Qty: 30 TABLET | Refills: 0 | Status: SHIPPED | OUTPATIENT
Start: 2023-06-15 | End: 2024-09-15

## 2023-06-14 RX ORDER — CLONIDINE HYDROCHLORIDE 0.1 MG/1
0.05 TABLET ORAL 3 TIMES DAILY
Status: DISCONTINUED | OUTPATIENT
Start: 2023-06-14 | End: 2023-06-14 | Stop reason: HOSPADM

## 2023-06-14 RX ORDER — NICOTINE 21 MG/24HR
1 PATCH, TRANSDERMAL 24 HOURS TRANSDERMAL DAILY
Qty: 30 PATCH | Refills: 0 | Status: SHIPPED | OUTPATIENT
Start: 2023-06-15 | End: 2024-09-15

## 2023-06-14 RX ORDER — MULTIPLE VITAMINS W/ MINERALS TAB 9MG-400MCG
1 TAB ORAL DAILY
Qty: 30 TABLET | Refills: 0 | Status: SHIPPED | OUTPATIENT
Start: 2023-06-14

## 2023-06-14 RX ORDER — GABAPENTIN 300 MG/1
300 CAPSULE ORAL 3 TIMES DAILY
Qty: 90 CAPSULE | Refills: 0 | Status: SHIPPED | OUTPATIENT
Start: 2023-06-14

## 2023-06-14 RX ORDER — DISULFIRAM 250 MG/1
250 TABLET ORAL DAILY
Qty: 30 TABLET | Refills: 0 | Status: SHIPPED | OUTPATIENT
Start: 2023-06-14

## 2023-06-14 RX ORDER — LANOLIN ALCOHOL/MO/W.PET/CERES
100 CREAM (GRAM) TOPICAL DAILY
Qty: 30 TABLET | Refills: 0 | Status: SHIPPED | OUTPATIENT
Start: 2023-06-15 | End: 2024-09-15

## 2023-06-14 RX ORDER — TRAZODONE HYDROCHLORIDE 50 MG/1
50 TABLET, FILM COATED ORAL
Qty: 30 TABLET | Refills: 0 | Status: SHIPPED | OUTPATIENT
Start: 2023-06-14 | End: 2024-09-15

## 2023-06-14 RX ADMIN — CLONIDINE HYDROCHLORIDE 0.05 MG: 0.1 TABLET ORAL at 14:27

## 2023-06-14 RX ADMIN — DISULFIRAM 250 MG: 250 TABLET ORAL at 10:24

## 2023-06-14 RX ADMIN — MULTIPLE VITAMINS W/ MINERALS TAB 1 TABLET: TAB at 08:34

## 2023-06-14 RX ADMIN — NICOTINE 1 PATCH: 21 PATCH, EXTENDED RELEASE TRANSDERMAL at 08:32

## 2023-06-14 RX ADMIN — THIAMINE HCL TAB 100 MG 100 MG: 100 TAB at 08:34

## 2023-06-14 RX ADMIN — FOLIC ACID 1 MG: 1 TABLET ORAL at 08:34

## 2023-06-14 RX ADMIN — PAROXETINE HYDROCHLORIDE 30 MG: 20 TABLET, FILM COATED ORAL at 08:35

## 2023-06-14 RX ADMIN — CLONIDINE HYDROCHLORIDE 0.05 MG: 0.1 TABLET ORAL at 10:24

## 2023-06-14 ASSESSMENT — ACTIVITIES OF DAILY LIVING (ADL)
ADLS_ACUITY_SCORE: 30
HYGIENE/GROOMING: INDEPENDENT
ADLS_ACUITY_SCORE: 30
ADLS_ACUITY_SCORE: 30
ORAL_HYGIENE: INDEPENDENT
LAUNDRY: WITH SUPERVISION
ADLS_ACUITY_SCORE: 30
DRESS: INDEPENDENT

## 2023-06-14 NOTE — PROGRESS NOTES
Brief Medicine Note    Contacted by nursing regarding elevated BP and plan for discharge today.   Initial /100, recheck 150s/90s which is acceptable for discharge.   Patient has not taken metoprolol here as recommended in initial medicine consult note. In discussion with patient her home med is Toprol XL 25 mg daily which was initially prescribed for mental health indications but not working so instead was switched to clonidine 0.05 mg TID. She has been taking the old metoprolol prescription rather than the new clonidine PTA because she was told not to take clonidine while drinking alcohol, but now that she is through detox and returning home would like to do the clonidine instead of metoprolol.   Can take a dose of clonidine 0.05 mg now and discharge as planned (has this med locked up here). Discussed that she will call her PCP today and schedule close follow up for ongoing HTN management, but may see some improvement just be maintaining sobriety.     Discussed with RANDALL.     Judith Gagnon PA-C  Hospitalist Service  Contact information available via [facility] Paging/Directory

## 2023-06-14 NOTE — PLAN OF CARE
Problem: Adult Behavioral Health Plan of Care  Goal: Plan of Care Review  Outcome: Progressing     Problem: Alcohol Withdrawal  Goal: Alcohol Withdrawal Symptom Control  Outcome: Progressing     Problem: Sleep Disturbance  Goal: Adequate Sleep/Rest  Outcome: Progressing   Goal Outcome Evaluation:    The patient spent much of this evening outside her room but returned to her room after supper for a nap. She participated in the evening group activity and interacted with her peers. The patient had a wide variety of emotions and seemed composed. She denied AVHs, SI/HI, pain, anxiety, and depression. She has no worries regarding her eating, sleep, or toileting habits. The patient took her medication as directed and reported no adverse side effects. With MSSA scores of 7 and 7, she did not get Valium. Pt received prn gabapentin once. Pt last used Valium on June 13th, at 05:02.

## 2023-06-14 NOTE — PLAN OF CARE
Problem: Alcohol Withdrawal  Goal: Alcohol Withdrawal Symptom Control  Outcome: Met   Goal Outcome Evaluation:    Pt was admitted for alcohol detox,  monitored for withdrawal symptoms using MSSA.   Patient's MSSA score has been less than 8 for > 24 hours. Patient has required no medication for withdrawal for > 24 hours.  Patient removed from detox status per unit Protocol.      Slept well during the night, denied all withdrawal symptoms.

## 2023-06-14 NOTE — PLAN OF CARE
Problem: Plan of Care - Pt out of detox and medically cleared and stable for discharge to home per provider order via taxicab.  Goal: Readiness for Transition of Care  Outcome: Adequate for Care Transition   Patient discharging 6/14/2023 unaccompanied and destination is home. Pt denies any SI/SIB/HI or other psychotic symptoms. Pt reports feeling good about discharge and all questions answered. Pt reports great satisfaction with care on 3A detox unit. Pt leaves unit at 1715 with all personal belongings, medications and paperwork.    Discharge paperwork and medications reviewed with pt who verbalize understanding.     Copies provided: AVS yes, copy of lab results        Illness Management Recovery model: Personal Plan of Care    Patient completed Personal Plan of Care, identifying reasons for hospitalization and goals for discharge. Form reviewed in team meeting  by patient, physician, writer and RN. Form given to [facility] to be scanned into EPIC.    Survey provided.

## 2023-06-14 NOTE — PLAN OF CARE
Goal Outcome Evaluation:  Pt has not had Valium in the last 24 hours, scores have been less than 8. Patient is removed from the detox status per unit protocol.

## 2023-06-14 NOTE — DISCHARGE INSTRUCTIONS
Behavioral Discharge Planning and Instructions  THANK YOU FOR CHOOSING THE [facility]  3A  [phone]    Summary: You were admitted to Station 3A on 6/12/2023 for detoxification from Alcohol. A medical exam was performed that included lab work. You have met with a  and opted to discharge to home and continue participation in Outpatient Treatment with [facility], to begin participation in [facility], as well as to connect with [facility] Support Groups. It was a pleasure working with you and the entire treatment team here wishes you the very best in your recovery! Please take care and make your recovery a priority, Steph!    Recommendation:  Discharge to home and continue participation in Outpatient Treatment program, follow-up with [facility] for Case Management (referral sent, see below), and participate in Women for Sobriety support groups.     Disposition: Discharging to home.     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  You have met with a  to develop a treatment plan for discharge.  You have had labs drawn and those results have been reviewed with you. Please take a copy of your lab work with you to your next primary care physician appointment.  Major Treatments, Procedures and Findings:  You were detoxed from alcohol with the Modified Selective Severity Protocol using Valium. You have met with a  to develop a treatment plan for discharge.  You have had labs drawn and a copy of those labs will be sent home with you.  Please bring your lab results with to your follow up doctor appointment.    Symptoms  to Report:  If you experience more anxiety, confusion, sleeplessness, deep sadness or thoughts of suicide, notify your treatment team or notify your primary care physician. IF ANY OF THE SYMPTOMS YOU ARE EXPERIENCING ARE A MEDICAL EMERGENCY CALL 911 IMMEDIATELY.     If you or someone you know is struggling or in crisis, help is available.  Call or text 988 or chat  at [email or web address]    Lifestyle Adjustment:   Health Action Plan:  1.Create a daily schedule  2. Eat Healthy  3. Plan Enjoyable Sober Activities  4. Use Problem Solving Skills and Deal with Issues as they Arise.   5. Be Physically Active  6. Take your medications as prescribed  7. Get enough restful sleep  8. Practice Relaxation  9. Spend time with Supportive People  10. No use of alcohol, illegal drugs or addictive medications other than what is currently prescribed.   11.AA, NA Sponsor are excellent resources for support  12. Explore how  you can utilize spirituality in your recovery    Primary Provider:  Felipe Ram MD [phone]    [address] [place] [address]       Facts about COVID19 at www.cdc.gov/COVID19 and www.[place].gov/covid19    Keeping hands clean is one of the most important steps we can take to avoid getting sick and spreading germs to others.  Please wash your hands frequently and lather with soap for at least 20 seconds!    Follow-up Appointments:    Primary Care   Appointment Date/Time: On Monday, 7/3/2023 at 2:40 pm  Provider: Felipe Ram MD    Address:  [facility] in [place], [place]  Address: [address] [place] [address]  Phone: [phone]        Psychiatry  Provider: Dr. Brody Casarez MD   Date/Time: Monday, 7/3/2023 at 10:20 am   [facility] [address] [place] [address]  Phone: [phone]  Website: www.[email or web address]    Therapy  Provider: Janie Peterson [facility]  "Center   Date/Time: Wednesday, 7/5/2023 at 11:15 am   Location: [facility] [address] [place] [address]  Phone: [phone]  Website: [employer]     Outpatient Treatment - [facility]   Details: They will text you information to rejoin group meetings. They have stated you should save this information for future meetings and it is not sent out after the initial invitation to join.  Location: Virtual - However, physical location is: [address] [place] [address]  Phone: [phone]  Website: [employer]     [facility] - Case Management Services  Call to follow-up on referral at: [phone]    [facility]    Provider: Anitha Jaramillo  Phone: [phone]    Women [facility] Sob[facility]ety   [phone]  General website: https://womenforsobriety.org/  Women for Sobriety - Meetings:   WFS Meeting ID [id number] Saturdays - 10:00 am  Regional Video Meeting - Last Saturday of [place] [address]  Email for more information at: [email or web address]      WFS Meeting ID [id number] Mondays - 10:[place] [address]  Email for more information at: [email or web address]      Resources:   Resources for on line recovery meetings:  AA meetings can be found online; search for them at: http://aa-intergroup.org/directory.php  AA meetings via [place] for [place] area can be found online at: https://a[place].org/find-a-meeting/holiday-closings/  NA meetings via [place] for [place] area can be found online at: https://sites.[employer].com/view/[place]regionofnarcoticsanonymous/home?authuser=2    Www.[employer]  has online resources for meeting and recovery care including Podcast \"Let's Talk:Addiction & Recovery Podcasts    Www.mnrecSynapse.org     DISCHARGE RESOURCES:  -SMART Recovery - self management for addiction recovery:  www.smartrecAutoGnomicsy.org    -Pathways ~ A Health Crisis Resource & Support Center: 569.453.5860.  -[facility] [phone]   -[facility] " Behavioral Intake 420-462-2830 or 950-919-4041.  -Suicide Awareness Voices of Education (SAVE) (www.save.org): 790-121-SRHZ (2987)  -National Suicide Prevention Line (www.mentalhealthmn.org): 197-098-HSEW (1316)  -National Powell on Mental Illness (www.[place].alexander.org): 164.787.5546 or 039-994-1872.  -Gmbv7zsak: text the word LIFE to 68659 for immediate support and crisis intervention  -Mental Health Consumer/Survivor Network of [place] (www.mhcsn.net): 766.131.5687 or 237-575-2604  -Mental Health Association of [place] (www.mentalhealth.org): 419.659.7313 or 026-604-9572     -Substance Abuse and Mental Health Services (www.samhsa.gov)  -Harm Reduction Coalition (www. Harmreduction.org)  -www.prescribetoprevent.org or http://prescribetoprevent.org/video  -Poison control 2-118-099-1965   **[place] Opioid Prevention Coalition: www.opioidcoalition.org    Sober Support Group Information:  AA/NA & Sponsor/Support  -Alcoholics Anonymous (www.alcoholics-anonymous.org): for local information 24 hours/day  -AA Intergroup service office in [place] (http://www.aastpaul.org/) 121.366.1536  -AA Intergroup service office in [facility]: 576.332.2463. (http://www.a[place].org/)  -Narcotics Anonymous (www.nam[place].org) (689) 509-4623   **Sober Fun Activities: www.sober-activities.[employer]/[facility]//[facility] Recovery Connection ([facility])  [facility] connects people seeking recovery to resources that help foster and sustain long-term recovery.  Whether you are seeking resources for treatment, transportation, housing, job training, education, health care or other pathways to recovery, [facility] is a great place to start.    Phone: (371) 306-3712.  www.minnesotacicayda.[employer] (Great listing of all types of recovery and non-recovery related resources)      General Medication Instructions:   See your medication sheet(s) for instructions.   Take all medicines as directed.  Make no changes unless your doctor suggests them.   Go to all your  doctor visits.  Be sure to have all your required lab tests. This way, your medicines can be refilled on time.  Do not use any drugs not prescribed by your provider.  AA/NA and Sponsors are excellent resources for support  Avoid alcohol.    Any follow up concerns:  Nursing questions call the Unit 3A-[facility] [phone]  Medical Record call [phone]  Outpatient Behavioral Intake call [phone]  LP+ Wait List/Bed Availability call [phone]    The entire treatment team has appreciated the opportunity to work with you Steph.  We wish you the best in the future and with your lifelong recovery goals. Please bring this discharge folder with you to all follow up appointments.  It contains your lab results, diagnosis, medication list and discharge recommendations.    THANK YOU FOR CHOOSING THE [facility]

## 2023-06-14 NOTE — DISCHARGE SUMMARY
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 for additional details.          Hospital Course:   PATIENT was admitted to [facility] attending  under DR mccauley, please review the detailed admit note on 6/13/2023   The patient was placed under status 15 (15 minute checks) to ensure patient safety.   MSSA protocol was initiated due to the patient's history of alcohol abuse and concern for withdrawal symptoms.  CBC, BMP and utox obtained.    All outpatient medications were continued  For depression patient will be started back on her medications  paxil 30mg  remeron 45mg  PATIENTdid participate in groups and was visible in the milieu.     The patient's symptoms of withdrawal improved.     Patients energy motivation , sleep appetite improved.  Pt completed detox . It was un eventful.      Discussed with patient medications for craving.  Pt is willing to take  antabuse    Spoke with patient about triggers coping skills relapse prevention.    CONSULTS DONE DURING PATIENTS HOSPITALIZATION.  Patient was seen by medicine on date6/13/23    This as per their medical consult    Assessment & Plan  Steph Drew is a 58 year old woman with a history of hypertension, MDD, MASOOD, alcohol use disorder, who was admitted to inpatient behavioral health for detoxification from alcohol.      Alcohol withdrawal   Alcohol use disorder  Drinking 750 mL per day. Last drink on 6/12. No history of withdrawal seizures or DTs.    - Continue on MSSA protocol with benzodiazepines as indicated   - Seizure precautions   - Management  per Psychiatry   - Agree with MVI, folic acid, and thiamine supplements   - Notify medicine for SBP >180 or DBP >110     Hypertension: BP elevated since admission in the setting of alcohol withdrawal.   - Continue PTA metoprolol 25 mg BID   - Clonidine 0.1 mg TID PRN for SBP >170     At this time she is medically stable and Medicine will sign off. Please do not hesitate to contact if new questions or concerns arise.                            Labs:reviewed with patient       Recent Results (from the past 48 hour(s))   Comprehensive metabolic panel    Collection Time: 06/12/23  5:38 PM   Result Value Ref [place]    Sodium 136 136 - 145 mmol/L    Potassium 3.6 3.4 - 5.3 mmol/L    Chloride 98 98 - 107 mmol/L    Carbon Dioxide (CO2) 20 (L) 22 - 29 mmol/L    Anion Gap 18 (H) 7 - 15 mmol/L    Urea Nitrogen 15.0 6.0 - 20.0 mg/dL    Creatinine 0.84 0.51 - 0.95 mg/dL    Calcium 9.0 8.6 - 10.0 mg/dL    Glucose 128 (H) 70 - 99 mg/dL    Alkaline Phosphatase 100 35 - 104 U/L    AST 34 10 - 35 U/L    ALT 29 10 - 35 U/L    Protein Total 7.5 6.4 - 8.3 g/dL    Albumin 4.5 3.5 - 5.2 g/dL    Bilirubin Total 0.5 <=1.2 mg/dL    GFR Estimate 80 >60 mL/min/1.73m2   Ethyl Alcohol Level    Collection Time: 06/12/23  5:38 PM   Result Value Ref [place]    Alcohol ethyl 0.05 (H) <=0.01 g/dL   CBC with platelets and differential    Collection Time: 06/12/23  5:38 PM   Result Value Ref [place]    WBC Count 10.5 4.0 - 11.0 10e3/uL    RBC Count 5.11 3.80 - 5.20 10e6/uL    Hemoglobin 15.2 11.7 - 15.7 g/dL    Hematocrit 42.9 35.0 - 47.0 %    MCV 84 78 - 100 fL    MCH 29.7 26.5 - 33.0 pg    MCHC 35.4 31.5 - 36.5 g/dL    RDW 11.7 10.0 - 15.0 %    Platelet Count 308 150 - 450 10e3/uL    % Neutrophils 50 %    % Lymphocytes 40 %    % Monocytes 7 %    % Eosinophils 2 %    % Basophils 1 %    % Immature Granulocytes 0 %    NRBCs per 100 WBC 0 <1 /100    Absolute Neutrophils 5.3 1.6 - 8.3 10e3/uL    Absolute Lymphocytes 4.2 0.8 - 5.3 10e3/uL    Absolute Monocytes  0.7 0.0 - 1.3 10e3/uL    Absolute Eosinophils 0.2 0.0 - 0.7 10e3/uL    Absolute Basophils 0.1 0.0 - 0.2 10e3/uL    Absolute Immature Granulocytes 0.0 <=0.4 10e3/uL    Absolute NRBCs 0.0 10e3/uL   Asymptomatic COVID-19 Virus (Coronavirus) by PCR Nasopharyngeal    Collection Time: 06/12/23  5:54 PM    Specimen: Nasopharyngeal; Swab   Result Value Ref [place]    SARS CoV2 PCR Negative Negative   Drug abuse screen 1 urine (ED)    Collection Time: 06/12/23  6:12 PM   Result Value Ref [place]    Amphetamines Urine Screen Negative Screen Negative    Barbituates Urine Screen Negative Screen Negative    Benzodiazepine Urine Screen Negative Screen Negative    Cannabinoids Urine Screen Positive (A) Screen Negative    Cocaine Urine Screen Negative Screen Negative    Opiates Urine Screen Negative Screen Negative   Lipid panel    Collection Time: 06/13/23  6:49 AM   Result Value Ref [place]    Cholesterol 151 <200 mg/dL    Triglycerides 524 (H) <150 mg/dL    Direct Measure HDL 47 (L) >=50 mg/dL    LDL Cholesterol Calculated      Non HDL Cholesterol 104 <130 mg/dL   Hemoglobin A1c    Collection Time: 06/13/23  6:49 AM   Result Value Ref [place]    Hemoglobin A1C 5.1 <5.7 %   TSH with free T4 reflex and/or T3 as indicated    Collection Time: 06/13/23  6:49 AM   Result Value Ref [place]    TSH 3.28 0.30 - 4.20 uIU/mL   GGT    Collection Time: 06/13/23  6:49 AM   Result Value Ref [place]    GGT 48 (H) 5 - 36 U/L   Vitamin B12    Collection Time: 06/13/23  6:49 AM   Result Value Ref [place]    Vitamin B12 497 232 - 1,245 pg/mL   Folate    Collection Time: 06/13/23  6:49 AM   Result Value Ref [place]    Folic Acid 9.8 4.6 - 34.8 ng/mL         Recent Results (from the past 240 hour(s))   Comprehensive metabolic panel    Collection Time: 06/12/23  5:38 PM   Result Value Ref [place]    Sodium 136 136 - 145 mmol/L    Potassium 3.6 3.4 - 5.3 mmol/L    Chloride 98 98 - 107 mmol/L    Carbon Dioxide (CO2) 20 (L) 22 - 29 mmol/L    Anion Gap 18 (H) 7 - 15 mmol/L     Urea Nitrogen 15.0 6.0 - 20.0 mg/dL    Creatinine 0.84 0.51 - 0.95 mg/dL    Calcium 9.0 8.6 - 10.0 mg/dL    Glucose 128 (H) 70 - 99 mg/dL    Alkaline Phosphatase 100 35 - 104 U/L    AST 34 10 - 35 U/L    ALT 29 10 - 35 U/L    Protein Total 7.5 6.4 - 8.3 g/dL    Albumin 4.5 3.5 - 5.2 g/dL    Bilirubin Total 0.5 <=1.2 mg/dL    GFR Estimate 80 >60 mL/min/1.73m2   Ethyl Alcohol Level    Collection Time: 06/12/23  5:38 PM   Result Value Ref [place]    Alcohol ethyl 0.05 (H) <=0.01 g/dL   CBC with platelets and differential    Collection Time: 06/12/23  5:38 PM   Result Value Ref [place]    WBC Count 10.5 4.0 - 11.0 10e3/uL    RBC Count 5.11 3.80 - 5.20 10e6/uL    Hemoglobin 15.2 11.7 - 15.7 g/dL    Hematocrit 42.9 35.0 - 47.0 %    MCV 84 78 - 100 fL    MCH 29.7 26.5 - 33.0 pg    MCHC 35.4 31.5 - 36.5 g/dL    RDW 11.7 10.0 - 15.0 %    Platelet Count 308 150 - 450 10e3/uL    % Neutrophils 50 %    % Lymphocytes 40 %    % Monocytes 7 %    % Eosinophils 2 %    % Basophils 1 %    % Immature Granulocytes 0 %    NRBCs per 100 WBC 0 <1 /100    Absolute Neutrophils 5.3 1.6 - 8.3 10e3/uL    Absolute Lymphocytes 4.2 0.8 - 5.3 10e3/uL    Absolute Monocytes 0.7 0.0 - 1.3 10e3/uL    Absolute Eosinophils 0.2 0.0 - 0.7 10e3/uL    Absolute Basophils 0.1 0.0 - 0.2 10e3/uL    Absolute Immature Granulocytes 0.0 <=0.4 10e3/uL    Absolute NRBCs 0.0 10e3/uL   Asymptomatic COVID-19 Virus (Coronavirus) by PCR Nasopharyngeal    Collection Time: 06/12/23  5:54 PM    Specimen: Nasopharyngeal; Swab   Result Value Ref [place]    SARS CoV2 PCR Negative Negative   Drug abuse screen 1 urine (ED)    Collection Time: 06/12/23  6:12 PM   Result Value Ref [place]    Amphetamines Urine Screen Negative Screen Negative    Barbituates Urine Screen Negative Screen Negative    Benzodiazepine Urine Screen Negative Screen Negative    Cannabinoids Urine Screen Positive (A) Screen Negative    Cocaine Urine Screen Negative Screen Negative    Opiates Urine Screen Negative  Screen Negative   Lipid panel    Collection Time: 06/13/23  6:49 AM   Result Value Ref [place]    Cholesterol 151 <200 mg/dL    Triglycerides 524 (H) <150 mg/dL    Direct Measure HDL 47 (L) >=50 mg/dL    LDL Cholesterol Calculated      Non HDL Cholesterol 104 <130 mg/dL   Hemoglobin A1c    Collection Time: 06/13/23  6:49 AM   Result Value Ref [place]    Hemoglobin A1C 5.1 <5.7 %   TSH with free T4 reflex and/or T3 as indicated    Collection Time: 06/13/23  6:49 AM   Result Value Ref [place]    TSH 3.28 0.30 - 4.20 uIU/mL   GGT    Collection Time: 06/13/23  6:49 AM   Result Value Ref [place]    GGT 48 (H) 5 - 36 U/L   Vitamin B12    Collection Time: 06/13/23  6:49 AM   Result Value Ref [place]    Vitamin B12 497 232 - 1,245 pg/mL   Folate    Collection Time: 06/13/23  6:49 AM   Result Value Ref [place]    Folic Acid 9.8 4.6 - 34.8 ng/mL            Because this patient meets criteria for an Alcohol Use Disorder, I performed the following brief intervention on the date of this note:              1) Expressed concern that the patient is drinking at unhealthy levels known to increase their risk of alcohol related problems              2) Gave feedback linking alcohol use and health, including personalized feedback explaining how alcohol use can interact with their medical and/or psychiatric problems, and with prescribed medications.              3) Advised patient to abstain.    PT counseled on nicotine cessation and nicotine replacement provided    Counseled the patient on the importance of having a recovery program in addition to medication to manage recovery.  Components include avoiding isolating, having willingness to change, avoiding triggers and managing cravings. Encouraged having some type of sober network and practicing honesty with trusted support person(s).     Discussed with patient many issues of addiction,triggers, relapse, and establishing a solid recovery program.    DISCHARGE MENTAL STATUS EXAMINATION:  The patient  is alert, oriented x3.  Good fund of knowledge.  Good use of language.  Recent and remote memory, language, fund of knowledge are all adequate.  Euthymic mood congruent affect  Speech normal rate/rhythm linear tp no loose asso,The patient does not have any active suicidal or homicidal ideation.  Does not have any auditory or visual hallucination.  Fair insight/judgment At this time, the patient was stable to be discharged.        Pt was not determined to not be a danger to himself or others. At the current time of discharge, the patient does not meet criteria for involuntary hospitalization. On the day of discharge, the patient reports that they do not have suicidal or homicidal ideation and would never hurt themselves or others. Steps taken to minimize risk include: assessing patient s behavior and thought process daily during hospital stay, discharging patient with adequate plan for follow up for mental and physical health and discussing safety plan of returning to the hospital should the patient ever have thoughts of harming themselves or others. Therefore, based on all available evidence including the factors cited above, the patient does not appear to be at imminent risk for self-harm, and is appropriate for outpatient level of care.     Educated about side effects/risk vs benefits /alternative including non treatment.Pt consented to be on medication.     .Total time spent on discharge summary more than 35 min  More than  20 min  planning, coordination of care, medication reconciliation and performance of physical exam on day of discharge.Care was coordinated with unit RN and unit therapist         Review of your medicines      UNREVIEWED medicines. Ask your doctor about these medicines      Dose / Directions   gabapentin 300 MG capsule  Commonly known as: NEURONTIN  Ask about: Which instructions should I use?      Dose: 300 mg  Take 300 mg by mouth 3 times daily  Refills: 0     hydrOXYzine 25 MG  tablet  Commonly known as: ATARAX  Ask about: Which instructions should I use?      Dose: 25 mg  Take 25 mg by mouth 3 times daily  Refills: 0     mirtazapine 45 MG tablet  Commonly known as: REMERON  Ask about: Which instructions should I use?      Dose: 45 mg  Take 45 mg by mouth At Bedtime  Refills: 0     multivitamin w/minerals tablet  Ask about: Which instructions should I use?      Dose: 1 tablet  Take 1 tablet by mouth daily  Refills: 0     PARoxetine 30 MG tablet  Commonly known as: PAXIL  Ask about: Which instructions should I use?      Dose: 30 mg  Take 30 mg by mouth every morning  Refills: 0     traZODone 50 MG tablet  Commonly known as: DESYREL  Ask about: Which instructions should I use?      Dose: 50 mg  Take 50 mg by mouth nightly as needed for sleep  Refills: 0             Disposition: home     Facts about COVID19 at www.cdc.gov/COVID19 and www.[place].gov/covid19     Keeping hands clean is one of the most important steps we can take to avoid getting sick and spreading germs to others.  Please wash your hands frequently and lather with soap for at least 20 seconds!     Medical Follow-Up:DR OMALLEY      Primary Care   Appointment Date/Time: On Monday, 7/3/2023 at 2:40 pm  Provider: Felipe Ram MD    Address:  [facility] in [place], [place]  Address: [address] [place] [address]  Phone: [phone]         Psychiatry  Provider: Dr. Brody Omalley MD   Date/Time: Monday, 7/3/2023 at 10:20 am   Location: [place] [address], [place] [address]  Phone: [phone]  Website: [email or web address]     Therapy  Provider: Janie Peterson [facility]   Date/Time: Wednesday, 7/5/2023 at 11:15 am   Location: [address] [place] [address]  Phone: [phone]  Website: [email or web address]      Outpatient Treatment - [facility]   Details: They will text you information to rejoin  "group meetings. They have stated you should save this information for future meetings and it is not sent out after the initial invitation to join.  Location: Virtual - However, physical location is: [facility] [place] [address]  Phone: [phone]  Website: [email or web address]      [facility]  Call to follow-up on referral at: [phone]     [facility]    Provider: Anitha Jaramillo  Phone: [phone]     [facility]   [phone]  General website: [email or web address]  [facility] - Meetings:   WFS Meeting ID [id number] Saturdays - 10:00 am  Regional Video Meeting - Last Saturday [place] [address]  Email for more information at: [email or web address]        WFS Meeting ID [id number] Mondays - 10:00 [place] [address]  Email for more information at: [email or web address]       Treatment Follow-Up:[facility]  .        \"Much or all of the text in this note was generated through the use of Dragon Dictate voice to text software. Errors in spelling or words which appear to be out of contact are unintentional, may be present due having escaped editing\"     "

## 2023-06-16 ENCOUNTER — PATIENT OUTREACH (OUTPATIENT)
Dept: CARE COORDINATION | Facility: CLINIC | Age: 58
End: 2023-06-16
Payer: COMMERCIAL

## 2023-06-16 NOTE — PROGRESS NOTES
Clinic Care Coordination Contact  [facility]/Voicemail       Clinical Data: Care Coordinator Outreach-TCM  Outreach attempted x 2.  Left message on patient's voicemail with call back information and requested return call.  Plan: Care Coordinator will make no further outreaches at this time.    ROXANA Preston   Social Work Clinic Care Coordinator   [facility]  PH: [phone]  [email or web address]

## 2024-09-15 ENCOUNTER — HOSPITAL ENCOUNTER (INPATIENT)
Facility: CLINIC | Age: 59
LOS: 2 days | Discharge: HOME | DRG: 280 | End: 2024-09-17
Attending: EMERGENCY MEDICINE | Admitting: INTERNAL MEDICINE
Payer: MEDICARE

## 2024-09-15 ENCOUNTER — APPOINTMENT (OUTPATIENT)
Dept: CT IMAGING | Facility: CLINIC | Age: 59
DRG: 280 | End: 2024-09-15
Attending: EMERGENCY MEDICINE
Payer: MEDICARE

## 2024-09-15 DIAGNOSIS — I50.9 ACUTE CONGESTIVE HEART FAILURE, UNSPECIFIED HEART FAILURE TYPE (H): ICD-10-CM

## 2024-09-15 DIAGNOSIS — R06.02 SOB (SHORTNESS OF BREATH): ICD-10-CM

## 2024-09-15 DIAGNOSIS — J96.01 ACUTE RESPIRATORY FAILURE WITH HYPOXIA (H): ICD-10-CM

## 2024-09-15 DIAGNOSIS — I21.4 NSTEMI (NON-ST ELEVATED MYOCARDIAL INFARCTION) (H): ICD-10-CM

## 2024-09-15 LAB
ANION GAP SERPL CALCULATED.3IONS-SCNC: 13 MMOL/L (ref 7–15)
ATRIAL RATE - MUSE: 93 BPM
BASE EXCESS BLDV CALC-SCNC: -1.7 MMOL/L (ref -3–3)
BASE EXCESS BLDV CALC-SCNC: -12 MMOL/L (ref -3–3)
BASE EXCESS BLDV CALC-SCNC: 0 MMOL/L (ref -3–3)
BASOPHILS # BLD AUTO: 0 10E3/UL (ref 0–0.2)
BASOPHILS NFR BLD AUTO: 0 %
BUN SERPL-MCNC: 6.8 MG/DL (ref 8–23)
CALCIUM SERPL-MCNC: 8.9 MG/DL (ref 8.8–10.4)
CHLORIDE SERPL-SCNC: 99 MMOL/L (ref 98–107)
CREAT SERPL-MCNC: 0.86 MG/DL (ref 0.51–0.95)
D DIMER PPP FEU-MCNC: 0.57 UG/ML FEU (ref 0–0.5)
DIASTOLIC BLOOD PRESSURE - MUSE: NORMAL MMHG
EGFRCR SERPLBLD CKD-EPI 2021: 77 ML/MIN/1.73M2
EOSINOPHIL # BLD AUTO: 0.2 10E3/UL (ref 0–0.7)
EOSINOPHIL NFR BLD AUTO: 2 %
ERYTHROCYTE [DISTWIDTH] IN BLOOD BY AUTOMATED COUNT: 12.9 % (ref 10–15)
FLUAV RNA SPEC QL NAA+PROBE: NEGATIVE
FLUBV RNA RESP QL NAA+PROBE: NEGATIVE
GLUCOSE SERPL-MCNC: 185 MG/DL (ref 70–99)
HCO3 BLDV-SCNC: 14 MMOL/L (ref 21–28)
HCO3 BLDV-SCNC: 25 MMOL/L (ref 21–28)
HCO3 BLDV-SCNC: 28 MMOL/L (ref 21–28)
HCO3 SERPL-SCNC: 26 MMOL/L (ref 22–29)
HCT VFR BLD AUTO: 41 % (ref 35–47)
HGB BLD-MCNC: 13.6 G/DL (ref 11.7–15.7)
HOLD SPECIMEN: NORMAL
IMM GRANULOCYTES # BLD: 0 10E3/UL
IMM GRANULOCYTES NFR BLD: 0 %
INTERPRETATION ECG - MUSE: NORMAL
LACTATE BLD-SCNC: 1 MMOL/L
LACTATE BLD-SCNC: 2.7 MMOL/L
LACTATE SERPL-SCNC: 1.9 MMOL/L (ref 0.7–2)
LYMPHOCYTES # BLD AUTO: 2.2 10E3/UL (ref 0.8–5.3)
LYMPHOCYTES NFR BLD AUTO: 24 %
MAGNESIUM SERPL-MCNC: 3 MG/DL (ref 1.7–2.3)
MCH RBC QN AUTO: 30.2 PG (ref 26.5–33)
MCHC RBC AUTO-ENTMCNC: 33.2 G/DL (ref 31.5–36.5)
MCV RBC AUTO: 91 FL (ref 78–100)
MONOCYTES # BLD AUTO: 0.5 10E3/UL (ref 0–1.3)
MONOCYTES NFR BLD AUTO: 6 %
NEUTROPHILS # BLD AUTO: 6 10E3/UL (ref 1.6–8.3)
NEUTROPHILS NFR BLD AUTO: 67 %
NRBC # BLD AUTO: 0 10E3/UL
NRBC BLD AUTO-RTO: 0 /100
NT-PROBNP SERPL-MCNC: 69 PG/ML (ref 0–900)
O2/TOTAL GAS SETTING VFR VENT: 4 %
OXYHGB MFR BLDV: 83 % (ref 70–75)
P AXIS - MUSE: 47 DEGREES
PCO2 BLDV: 29 MM HG (ref 40–50)
PCO2 BLDV: 47 MM HG (ref 40–50)
PCO2 BLDV: 62 MM HG (ref 40–50)
PH BLDV: 7.26 [PH] (ref 7.32–7.43)
PH BLDV: 7.28 [PH] (ref 7.32–7.43)
PH BLDV: 7.33 [PH] (ref 7.32–7.43)
PLATELET # BLD AUTO: 201 10E3/UL (ref 150–450)
PO2 BLDV: 20 MM HG (ref 25–47)
PO2 BLDV: 40 MM HG (ref 25–47)
PO2 BLDV: 52 MM HG (ref 25–47)
POTASSIUM SERPL-SCNC: 3.6 MMOL/L (ref 3.4–5.3)
PR INTERVAL - MUSE: 166 MS
QRS DURATION - MUSE: 80 MS
QT - MUSE: 402 MS
QTC - MUSE: 499 MS
R AXIS - MUSE: 30 DEGREES
RBC # BLD AUTO: 4.51 10E6/UL (ref 3.8–5.2)
RSV RNA SPEC NAA+PROBE: NEGATIVE
SAO2 % BLDV: 25 % (ref 70–75)
SAO2 % BLDV: 70 % (ref 70–75)
SAO2 % BLDV: 87.1 % (ref 70–75)
SARS-COV-2 RNA RESP QL NAA+PROBE: NEGATIVE
SODIUM SERPL-SCNC: 138 MMOL/L (ref 135–145)
SYSTOLIC BLOOD PRESSURE - MUSE: NORMAL MMHG
T AXIS - MUSE: 51 DEGREES
T4 FREE SERPL-MCNC: 1.09 NG/DL (ref 0.9–1.7)
TROPONIN T SERPL HS-MCNC: 129 NG/L
TROPONIN T SERPL HS-MCNC: 261 NG/L
TROPONIN T SERPL HS-MCNC: 335 NG/L
TSH SERPL DL<=0.005 MIU/L-ACNC: 5.92 UIU/ML (ref 0.3–4.2)
VENTRICULAR RATE- MUSE: 93 BPM
WBC # BLD AUTO: 9.1 10E3/UL (ref 4–11)

## 2024-09-15 PROCEDURE — 93005 ELECTROCARDIOGRAM TRACING: CPT

## 2024-09-15 PROCEDURE — 36415 COLL VENOUS BLD VENIPUNCTURE: CPT | Performed by: EMERGENCY MEDICINE

## 2024-09-15 PROCEDURE — 83735 ASSAY OF MAGNESIUM: CPT | Performed by: EMERGENCY MEDICINE

## 2024-09-15 PROCEDURE — 250N000013 HC RX MED GY IP 250 OP 250 PS 637: Performed by: EMERGENCY MEDICINE

## 2024-09-15 PROCEDURE — 84484 ASSAY OF TROPONIN QUANT: CPT | Performed by: EMERGENCY MEDICINE

## 2024-09-15 PROCEDURE — 83880 ASSAY OF NATRIURETIC PEPTIDE: CPT | Performed by: EMERGENCY MEDICINE

## 2024-09-15 PROCEDURE — 96365 THER/PROPH/DIAG IV INF INIT: CPT

## 2024-09-15 PROCEDURE — 210N000002 HC R&B HEART CARE

## 2024-09-15 PROCEDURE — 258N000003 HC RX IP 258 OP 636: Performed by: EMERGENCY MEDICINE

## 2024-09-15 PROCEDURE — 250N000011 HC RX IP 250 OP 636: Performed by: EMERGENCY MEDICINE

## 2024-09-15 PROCEDURE — 85379 FIBRIN DEGRADATION QUANT: CPT | Performed by: EMERGENCY MEDICINE

## 2024-09-15 PROCEDURE — 82805 BLOOD GASES W/O2 SATURATION: CPT | Performed by: EMERGENCY MEDICINE

## 2024-09-15 PROCEDURE — 84443 ASSAY THYROID STIM HORMONE: CPT | Performed by: EMERGENCY MEDICINE

## 2024-09-15 PROCEDURE — 96368 THER/DIAG CONCURRENT INF: CPT

## 2024-09-15 PROCEDURE — 250N000009 HC RX 250: Performed by: EMERGENCY MEDICINE

## 2024-09-15 PROCEDURE — 80048 BASIC METABOLIC PNL TOTAL CA: CPT | Performed by: EMERGENCY MEDICINE

## 2024-09-15 PROCEDURE — 87637 SARSCOV2&INF A&B&RSV AMP PRB: CPT | Performed by: EMERGENCY MEDICINE

## 2024-09-15 PROCEDURE — 94640 AIRWAY INHALATION TREATMENT: CPT

## 2024-09-15 PROCEDURE — 83605 ASSAY OF LACTIC ACID: CPT

## 2024-09-15 PROCEDURE — 82803 BLOOD GASES ANY COMBINATION: CPT

## 2024-09-15 PROCEDURE — 250N000009 HC RX 250

## 2024-09-15 PROCEDURE — 71275 CT ANGIOGRAPHY CHEST: CPT | Mod: MA

## 2024-09-15 PROCEDURE — 99291 CRITICAL CARE FIRST HOUR: CPT | Mod: 25

## 2024-09-15 PROCEDURE — 36415 COLL VENOUS BLD VENIPUNCTURE: CPT | Performed by: INTERNAL MEDICINE

## 2024-09-15 PROCEDURE — 84484 ASSAY OF TROPONIN QUANT: CPT | Performed by: INTERNAL MEDICINE

## 2024-09-15 PROCEDURE — 99223 1ST HOSP IP/OBS HIGH 75: CPT | Mod: AI | Performed by: INTERNAL MEDICINE

## 2024-09-15 PROCEDURE — 84439 ASSAY OF FREE THYROXINE: CPT | Performed by: EMERGENCY MEDICINE

## 2024-09-15 PROCEDURE — 85025 COMPLETE CBC W/AUTO DIFF WBC: CPT | Performed by: EMERGENCY MEDICINE

## 2024-09-15 RX ORDER — ACETAMINOPHEN 325 MG/1
650 TABLET ORAL EVERY 4 HOURS PRN
Status: DISCONTINUED | OUTPATIENT
Start: 2024-09-15 | End: 2024-09-17 | Stop reason: HOSPADM

## 2024-09-15 RX ORDER — DISULFIRAM 250 MG/1
250 TABLET ORAL DAILY
Status: DISCONTINUED | OUTPATIENT
Start: 2024-09-16 | End: 2024-09-17 | Stop reason: HOSPADM

## 2024-09-15 RX ORDER — DOXYCYCLINE 100 MG/10ML
100 INJECTION, POWDER, LYOPHILIZED, FOR SOLUTION INTRAVENOUS ONCE
Status: COMPLETED | OUTPATIENT
Start: 2024-09-15 | End: 2024-09-15

## 2024-09-15 RX ORDER — ONDANSETRON 4 MG/1
4 TABLET, ORALLY DISINTEGRATING ORAL EVERY 6 HOURS PRN
Status: DISCONTINUED | OUTPATIENT
Start: 2024-09-15 | End: 2024-09-17 | Stop reason: HOSPADM

## 2024-09-15 RX ORDER — DOXYCYCLINE 100 MG/1
100 CAPSULE ORAL EVERY 12 HOURS SCHEDULED
Status: DISCONTINUED | OUTPATIENT
Start: 2024-09-16 | End: 2024-09-16

## 2024-09-15 RX ORDER — ACETAMINOPHEN 650 MG/1
650 SUPPOSITORY RECTAL EVERY 4 HOURS PRN
Status: DISCONTINUED | OUTPATIENT
Start: 2024-09-15 | End: 2024-09-17 | Stop reason: HOSPADM

## 2024-09-15 RX ORDER — GABAPENTIN 300 MG/1
300 CAPSULE ORAL DAILY PRN
Status: DISCONTINUED | OUTPATIENT
Start: 2024-09-15 | End: 2024-09-17 | Stop reason: HOSPADM

## 2024-09-15 RX ORDER — GABAPENTIN 300 MG/1
300 CAPSULE ORAL DAILY PRN
COMMUNITY

## 2024-09-15 RX ORDER — PROCHLORPERAZINE MALEATE 10 MG
10 TABLET ORAL EVERY 6 HOURS PRN
Status: DISCONTINUED | OUTPATIENT
Start: 2024-09-15 | End: 2024-09-17 | Stop reason: HOSPADM

## 2024-09-15 RX ORDER — AZITHROMYCIN 500 MG/1
500 INJECTION, POWDER, LYOPHILIZED, FOR SOLUTION INTRAVENOUS ONCE
Status: DISCONTINUED | OUTPATIENT
Start: 2024-09-15 | End: 2024-09-15

## 2024-09-15 RX ORDER — ROSUVASTATIN CALCIUM 10 MG/1
10 TABLET, COATED ORAL DAILY
COMMUNITY

## 2024-09-15 RX ORDER — LIDOCAINE 40 MG/G
CREAM TOPICAL
Status: DISCONTINUED | OUTPATIENT
Start: 2024-09-15 | End: 2024-09-17 | Stop reason: HOSPADM

## 2024-09-15 RX ORDER — AMOXICILLIN 250 MG
1 CAPSULE ORAL 2 TIMES DAILY PRN
Status: DISCONTINUED | OUTPATIENT
Start: 2024-09-15 | End: 2024-09-17 | Stop reason: HOSPADM

## 2024-09-15 RX ORDER — CALCIUM CARBONATE 500 MG/1
1000 TABLET, CHEWABLE ORAL 4 TIMES DAILY PRN
Status: DISCONTINUED | OUTPATIENT
Start: 2024-09-15 | End: 2024-09-17 | Stop reason: HOSPADM

## 2024-09-15 RX ORDER — ASPIRIN 81 MG/1
81 TABLET ORAL DAILY
Status: DISCONTINUED | OUTPATIENT
Start: 2024-09-16 | End: 2024-09-17 | Stop reason: HOSPADM

## 2024-09-15 RX ORDER — ONDANSETRON 2 MG/ML
4 INJECTION INTRAMUSCULAR; INTRAVENOUS EVERY 6 HOURS PRN
Status: DISCONTINUED | OUTPATIENT
Start: 2024-09-15 | End: 2024-09-17 | Stop reason: HOSPADM

## 2024-09-15 RX ORDER — CLONIDINE HYDROCHLORIDE 0.1 MG/1
0.05 TABLET ORAL DAILY PRN
Status: ON HOLD | COMMUNITY
End: 2024-09-17

## 2024-09-15 RX ORDER — PROCHLORPERAZINE 25 MG
25 SUPPOSITORY, RECTAL RECTAL EVERY 12 HOURS PRN
Status: DISCONTINUED | OUTPATIENT
Start: 2024-09-15 | End: 2024-09-17 | Stop reason: HOSPADM

## 2024-09-15 RX ORDER — ASPIRIN 81 MG/1
324 TABLET, CHEWABLE ORAL ONCE
Status: COMPLETED | OUTPATIENT
Start: 2024-09-15 | End: 2024-09-15

## 2024-09-15 RX ORDER — PAROXETINE 20 MG/1
40 TABLET, FILM COATED ORAL EVERY MORNING
Status: DISCONTINUED | OUTPATIENT
Start: 2024-09-16 | End: 2024-09-17 | Stop reason: HOSPADM

## 2024-09-15 RX ORDER — AMOXICILLIN 250 MG
2 CAPSULE ORAL 2 TIMES DAILY PRN
Status: DISCONTINUED | OUTPATIENT
Start: 2024-09-15 | End: 2024-09-17 | Stop reason: HOSPADM

## 2024-09-15 RX ORDER — ROSUVASTATIN CALCIUM 10 MG/1
10 TABLET, COATED ORAL DAILY
Status: DISCONTINUED | OUTPATIENT
Start: 2024-09-16 | End: 2024-09-17 | Stop reason: HOSPADM

## 2024-09-15 RX ORDER — GABAPENTIN 300 MG/1
300 CAPSULE ORAL 2 TIMES DAILY
Status: DISCONTINUED | OUTPATIENT
Start: 2024-09-16 | End: 2024-09-17 | Stop reason: HOSPADM

## 2024-09-15 RX ORDER — IOPAMIDOL 755 MG/ML
75 INJECTION, SOLUTION INTRAVASCULAR ONCE
Status: COMPLETED | OUTPATIENT
Start: 2024-09-15 | End: 2024-09-15

## 2024-09-15 RX ORDER — IPRATROPIUM BROMIDE AND ALBUTEROL SULFATE 2.5; .5 MG/3ML; MG/3ML
3 SOLUTION RESPIRATORY (INHALATION)
Status: DISCONTINUED | OUTPATIENT
Start: 2024-09-15 | End: 2024-09-17 | Stop reason: HOSPADM

## 2024-09-15 RX ORDER — METHYLPREDNISOLONE SODIUM SUCCINATE 125 MG/2ML
40 INJECTION, POWDER, LYOPHILIZED, FOR SOLUTION INTRAMUSCULAR; INTRAVENOUS EVERY 8 HOURS
Status: DISCONTINUED | OUTPATIENT
Start: 2024-09-16 | End: 2024-09-16

## 2024-09-15 RX ORDER — IPRATROPIUM BROMIDE AND ALBUTEROL SULFATE 2.5; .5 MG/3ML; MG/3ML
SOLUTION RESPIRATORY (INHALATION)
Status: COMPLETED
Start: 2024-09-15 | End: 2024-09-15

## 2024-09-15 RX ORDER — CEFTRIAXONE 2 G/1
2 INJECTION, POWDER, FOR SOLUTION INTRAMUSCULAR; INTRAVENOUS ONCE
Status: COMPLETED | OUTPATIENT
Start: 2024-09-15 | End: 2024-09-15

## 2024-09-15 RX ORDER — HEPARIN SODIUM 10000 [USP'U]/100ML
0-5000 INJECTION, SOLUTION INTRAVENOUS CONTINUOUS
Status: DISCONTINUED | OUTPATIENT
Start: 2024-09-15 | End: 2024-09-15

## 2024-09-15 RX ORDER — FUROSEMIDE 10 MG/ML
60 INJECTION INTRAMUSCULAR; INTRAVENOUS ONCE
Status: COMPLETED | OUTPATIENT
Start: 2024-09-15 | End: 2024-09-15

## 2024-09-15 RX ORDER — PAROXETINE 20 MG/1
40 TABLET, FILM COATED ORAL EVERY MORNING
COMMUNITY

## 2024-09-15 RX ORDER — MIRTAZAPINE 15 MG/1
45 TABLET, FILM COATED ORAL AT BEDTIME
Status: DISCONTINUED | OUTPATIENT
Start: 2024-09-15 | End: 2024-09-17 | Stop reason: HOSPADM

## 2024-09-15 RX ORDER — HEPARIN SODIUM 10000 [USP'U]/100ML
0-5000 INJECTION, SOLUTION INTRAVENOUS CONTINUOUS
Status: DISCONTINUED | OUTPATIENT
Start: 2024-09-15 | End: 2024-09-16

## 2024-09-15 RX ADMIN — FUROSEMIDE 60 MG: 10 INJECTION, SOLUTION INTRAMUSCULAR; INTRAVENOUS at 20:44

## 2024-09-15 RX ADMIN — HEPARIN SODIUM 1150 UNITS/HR: 10000 INJECTION, SOLUTION INTRAVENOUS at 20:49

## 2024-09-15 RX ADMIN — IOPAMIDOL 75 ML: 755 INJECTION, SOLUTION INTRAVENOUS at 19:02

## 2024-09-15 RX ADMIN — SODIUM CHLORIDE 98 ML: 9 INJECTION, SOLUTION INTRAVENOUS at 19:02

## 2024-09-15 RX ADMIN — CEFTRIAXONE SODIUM 2 G: 2 INJECTION, POWDER, FOR SOLUTION INTRAMUSCULAR; INTRAVENOUS at 17:47

## 2024-09-15 RX ADMIN — IPRATROPIUM BROMIDE AND ALBUTEROL SULFATE 3 ML: .5; 3 SOLUTION RESPIRATORY (INHALATION) at 17:43

## 2024-09-15 RX ADMIN — SODIUM CHLORIDE 500 ML: 9 INJECTION, SOLUTION INTRAVENOUS at 17:45

## 2024-09-15 RX ADMIN — ASPIRIN 81 MG CHEWABLE TABLET 324 MG: 81 TABLET CHEWABLE at 18:15

## 2024-09-15 RX ADMIN — DOXYCYCLINE 100 MG: 100 INJECTION, POWDER, LYOPHILIZED, FOR SOLUTION INTRAVENOUS at 17:54

## 2024-09-15 ASSESSMENT — ACTIVITIES OF DAILY LIVING (ADL)
ADLS_ACUITY_SCORE: 35

## 2024-09-15 ASSESSMENT — COLUMBIA-SUICIDE SEVERITY RATING SCALE - C-SSRS
6. HAVE YOU EVER DONE ANYTHING, STARTED TO DO ANYTHING, OR PREPARED TO DO ANYTHING TO END YOUR LIFE?: NO
2. HAVE YOU ACTUALLY HAD ANY THOUGHTS OF KILLING YOURSELF IN THE PAST MONTH?: NO
1. IN THE PAST MONTH, HAVE YOU WISHED YOU WERE DEAD OR WISHED YOU COULD GO TO SLEEP AND NOT WAKE UP?: NO

## 2024-09-15 NOTE — PHARMACY-ADMISSION MEDICATION HISTORY
Pharmacist Admission Medication History    Admission medication history is complete. The information provided in this note is only as accurate as the sources available at the time of the update.    Information Source(s): Patient and CareEverywhere/SureScripts via in-person    Pertinent Information: She reports being prescribed vitamin D but ran out.    Changes made to PTA medication list:  Added: rosuvastatin  Deleted: trazodone, thiamine, nicotine gum & patch  Changed: paxil from 30mg to 40mg, gabapentin & clonidine from scheduled TID to scheduled BID with extra daily prn.    Allergies reviewed with patient and updates made in EHR: yes    Medication History Completed By: Jacqueline Jones [facility] 9/15/2024 6:27 PM    PTA Med List   Medication Sig Last Dose    cloNIDine (CATAPRES) 0.1 MG tablet Take 0.05 mg by mouth daily as needed (anxiety). In addition to 0.05 mg twice daily Past Week    cloNIDine (CATAPRES) 0.1 MG tablet Take 0.05 mg by mouth 2 times daily. 9/15/2024 at x2    disulfiram (ANTABUSE) 250 MG tablet Take 1 tablet (250 mg) by mouth daily 9/15/2024 at am    gabapentin (NEURONTIN) 300 MG capsule Take 300 mg by mouth daily as needed for other (anxiety). In addition to 300mg twice daily Past Week    gabapentin (NEURONTIN) 300 MG capsule Take 1 capsule (300 mg) by mouth 3 times daily (Patient taking differently: Take 300 mg by mouth 2 times daily.) 9/15/2024 at x2    mirtazapine (REMERON) 45 MG tablet Take 1 tablet (45 mg) by mouth At Bedtime 9/14/2024 at hs    multivitamin w/minerals (THERA-VIT-M) tablet Take 1 tablet by mouth daily 9/15/2024 at am    PARoxetine (PAXIL) 20 MG tablet Take 40 mg by mouth every morning. 9/15/2024 at am    rosuvastatin (CRESTOR) 10 MG tablet Take 10 mg by mouth daily. 9/15/2024 at am

## 2024-09-15 NOTE — ED NOTES
Bed: ED20  Expected date:   Expected time:   Means of arrival:   Comments:  428  59 F sob/asthma?  Here now

## 2024-09-15 NOTE — ED PROVIDER NOTES
Emergency Department Note      History of Present Illness     Chief Complaint   Shortness of Breath      HPI   Steph Drew is a 59 year old female with a history of anxiety, alcoholic peripheral neuropathy who presents to the ED for an evaluation of shortness of breath. The patient reports that she began having shortness of breath this afternoon. Ems notes that her oxygen was at 81 when they arrived at the scene. They gave her 2 DuoNeb, albuterol turbutaline 125 of solumedrol and  was given 100 CC of bolus here in the ED. States that her shortness of breath gets worse with moving. Endorses having a history of peripheral neuropathy with some feet swelling. She adds that she is on medication for cholesterol, anxiety and depression. Endorses tobacco use. Denies any history of diabetes and hypertension. No history of COPD. No fever. No GI symptoms.     Independent Historian   Patient and EMS , as per HPI.       Review of External Notes   I reviewed the patient's office visit note from 05/22/2024 with Yo Bush, where she has apneic episodes when she is sleeping but no sleep apnea.     Past Medical History     Medical History and Problem List   Osteonecrosis   Irritable bowel syndrome   Alcoholic peripheral neuropathy   Charcot arthoplasty of midfoot  Primary hypertension   Dermatochalasis of both upper eyelids   Major Depressive Disorder  Alcohol abuse    Medications   Antabuse   Catapres   Gabapentin   Mirtazapine   Paxil   Desyrel     Surgical History   Appendectomy   Tonsillectomy, adenoidectomy combined     Physical Exam     Patient Vitals for the past 24 hrs:   BP Temp Temp src Pulse Resp SpO2 Height Weight   09/15/24 2300 (!) 119/99 -- -- 70 14 99 % -- --   09/15/24 2230 113/87 -- -- 72 15 99 % -- --   09/15/24 2200 (!) 85/49 -- -- 80 11 99 % -- --   09/15/24 2130 95/73 -- -- 78 19 98 % -- --   09/15/24 2100 (!) 128/94 -- -- 86 18 97 % -- --   09/15/24 2054 -- -- -- -- -- -- -- 104.8 kg (231 lb)  "  09/15/24 2001 97/75 -- -- 79 -- -- -- --   09/15/24 2000 (!) 86/60 -- -- 79 13 95 % -- --   09/15/24 1930 107/81 -- -- 86 24 94 % -- --   09/15/24 1850 -- -- -- 83 17 96 % -- --   09/15/24 1840 -- -- -- 84 15 96 % -- --   09/15/24 1830 104/77 -- -- 91 14 94 % -- --   09/15/24 1820 -- -- -- 95 24 95 % -- --   09/15/24 1810 -- -- -- 94 14 94 % -- --   09/15/24 1800 110/79 -- -- 90 13 94 % -- --   09/15/24 1730 104/76 -- -- 93 24 93 % -- --   09/15/24 1727 103/83 -- -- 96 29 94 % -- --   09/15/24 1715 120/87 -- -- 103 16 91 % -- --   09/15/24 1714 120/87 97.8  F (36.6  C) Oral 97 29 (!) 84 % 1.676 m (5' 6\") 95.3 kg (210 lb)     Physical Exam  General: Alert, appears well-developed and well-nourished. Cooperative.     In mild distress  HEENT:  Head:  Atraumatic  Ears:  External ears are normal  Mouth/Throat:  Oropharynx is without erythema or exudate and mucous membranes are moist.   Eyes:   Conjunctivae normal and EOM are normal. No scleral icterus.  CV:  Normal rate, regular rhythm, normal heart sounds and radial pulses are 2+ and symmetric.  No murmur.  Resp:  Breath sounds are coarse bilaterally with diffuse expiratory wheezing.      Non-labored, no retractions or accessory muscle use  GI:  Abdomen is soft, no distension, no tenderness. No rebound or guarding.  No CVA tenderness bilaterally  MS:  Normal range of motion. No edema.    Normal strength in all 4 extremities.     Back atraumatic.    No midline cervical, thoracic, or lumbar tenderness  Skin:  Warm and dry.  No rash or lesions noted.  Neuro:   Alert. Normal strength.  GCS: 15  Psych: Normal mood and affect.    Diagnostics     Lab Results   Labs Ordered and Resulted from Time of ED Arrival to Time of ED Departure   BASIC METABOLIC PANEL - Abnormal       Result Value    Sodium 138      Potassium 3.6      Chloride 99      Carbon Dioxide (CO2) 26      Anion Gap 13      Urea Nitrogen 6.8 (*)     Creatinine 0.86      GFR Estimate 77      Calcium 8.9      " Glucose 185 (*)    D DIMER QUANTITATIVE - Abnormal    D-Dimer Quantitative 0.57 (*)    TROPONIN T, HIGH SENSITIVITY - Abnormal    Troponin T, High Sensitivity 129 (*)    TSH WITH FREE T4 REFLEX - Abnormal    TSH 5.92 (*)    MAGNESIUM - Abnormal    Magnesium 3.0 (*)    ISTAT GASES LACTATE VENOUS POCT - Abnormal    Lactic Acid POCT 2.7 (*)     Bicarbonate Venous POCT 28      O2 Sat, Venous POCT 25 (*)     pCO2 Venous POCT 62 (*)     pH Venous POCT 7.26 (*)     pO2 Venous POCT 20 (*)     Base Excess/Deficit (+/-) POCT 0.0     ISTAT GASES LACTATE VENOUS POCT - Abnormal    Lactic Acid POCT 1.0      Bicarbonate Venous POCT 14 (*)     O2 Sat, Venous POCT 70      pCO2 Venous POCT 29 (*)     pH Venous POCT 7.28 (*)     pO2 Venous POCT 40      Base Excess/Deficit (+/-) POCT -12.0 (*)    TROPONIN T, HIGH SENSITIVITY - Abnormal    Troponin T, High Sensitivity 335 (*)    BLOOD GAS VENOUS - Abnormal    pH Venous 7.33      pCO2 Venous 47      pO2 Venous 52 (*)     Bicarbonate Venous 25      Base Excess/Deficit Venous -1.7      FIO2 4      Oxyhemoglobin Venous 83 (*)     O2 Sat, Venous 87.1 (*)    TROPONIN T, HIGH SENSITIVITY - Abnormal    Troponin T, High Sensitivity 261 (*)    NT PROBNP INPATIENT - Normal    N terminal Pro BNP Inpatient 69     T4 FREE - Normal    Free T4 1.09     INFLUENZA A/B, RSV, & SARS-COV2 PCR - Normal    Influenza A PCR Negative      Influenza B PCR Negative      RSV PCR Negative      SARS CoV2 PCR Negative     LACTIC ACID WHOLE BLOOD - Normal    Lactic Acid 1.9     CBC WITH PLATELETS AND DIFFERENTIAL    WBC Count 9.1      RBC Count 4.51      Hemoglobin 13.6      Hematocrit 41.0      MCV 91      MCH 30.2      MCHC 33.2      RDW 12.9      Platelet Count 201      % Neutrophils 67      % Lymphocytes 24      % Monocytes 6      % Eosinophils 2      % Basophils 0      % Immature Granulocytes 0      NRBCs per 100 WBC 0      Absolute Neutrophils 6.0      Absolute Lymphocytes 2.2      Absolute Monocytes 0.5       Absolute Eosinophils 0.2      Absolute Basophils 0.0      Absolute Immature Granulocytes 0.0      Absolute NRBCs 0.0         Imaging   CT Chest Pulmonary Embolism w Contrast   Final Result   IMPRESSION:      1.  No acute pulmonary embolism or aortopathy.   2.  Moderate interstitial lung edema, consistent with acute congestive heart failure.       Echocardiogram Complete    (Results Pending)       EKG   ECG taken at 1739, ECG read at 1813  Normal sinus rhythm   Normal ECG   Rate 93 bpm. CO interval 166 ms. QRS duration 80 ms. QT/QTc 402/499 ms. P-R-T axes 47 30 51.    Independent Interpretation   None    ED Course      Medications Administered   Medications   lidocaine 1 % 0.1-1 mL (has no administration in time range)   lidocaine (LMX4) cream (has no administration in time range)   sodium chloride (PF) 0.9% PF flush 3 mL ( Intracatheter Canceled Entry 9/15/24 2220)   sodium chloride (PF) 0.9% PF flush 3 mL (has no administration in time range)   senna-docusate (SENOKOT-S/PERICOLACE) 8.6-50 MG per tablet 1 tablet (has no administration in time range)     Or   senna-docusate (SENOKOT-S/PERICOLACE) 8.6-50 MG per tablet 2 tablet (has no administration in time range)   calcium carbonate (TUMS) chewable tablet 1,000 mg (has no administration in time range)   Patient is already receiving anticoagulation with heparin, enoxaparin (LOVENOX), warfarin (COUMADIN)  or other anticoagulant medication (has no administration in time range)   acetaminophen (TYLENOL) tablet 650 mg (has no administration in time range)     Or   acetaminophen (TYLENOL) Suppository 650 mg (has no administration in time range)   ondansetron (ZOFRAN ODT) ODT tab 4 mg (has no administration in time range)     Or   ondansetron (ZOFRAN) injection 4 mg (has no administration in time range)   prochlorperazine (COMPAZINE) injection 10 mg (has no administration in time range)     Or   prochlorperazine (COMPAZINE) tablet 10 mg (has no administration in time range)      Or   prochlorperazine (COMPAZINE) suppository 25 mg (has no administration in time range)   ipratropium - albuterol 0.5 mg/2.5 mg/3 mL (DUONEB) neb solution 3 mL (3 mLs Nebulization Not Given 9/15/24 2219)   methylPREDNISolone Na Suc (solu-MEDROL) injection 37.5 mg (has no administration in time range)   disulfiram (ANTABUSE) tablet 250 mg (has no administration in time range)   gabapentin (NEURONTIN) capsule 300 mg (has no administration in time range)   gabapentin (NEURONTIN) capsule 300 mg (has no administration in time range)   mirtazapine (REMERON) tablet 45 mg (has no administration in time range)   PARoxetine (PAXIL) tablet 40 mg (has no administration in time range)   rosuvastatin (CRESTOR) tablet 10 mg (has no administration in time range)   heparin 25,000 units in 0.45% NaCl 250 mL ANTICOAGULANT infusion (1,150 Units/hr Intravenous Rate/Dose Verify 9/15/24 2109)   doxycycline hyclate (VIBRAMYCIN) capsule 100 mg (has no administration in time range)   aspirin EC tablet 81 mg (has no administration in time range)   ipratropium - albuterol 0.5 mg/2.5 mg/3 mL (DUONEB) 0.5-2.5 (3) MG/3ML neb solution (3 mLs  $Given 9/15/24 1743)   cefTRIAXone (ROCEPHIN) 2 g vial to attach to  ml bag for ADULTS or NS 50 ml bag for PEDS (0 g Intravenous Stopped 9/15/24 1816)   sodium chloride 0.9% BOLUS 500 mL (0 mLs Intravenous Stopped 9/15/24 1836)   doxycycline (VIBRAMYCIN) 100 mg vial to attach to  mL bag (0 mg Intravenous Stopped 9/15/24 1858)   aspirin (ASA) chewable tablet 324 mg (324 mg Oral $Given 9/15/24 1815)   iopamidol (ISOVUE-370) solution 75 mL (75 mLs Intravenous $Given 9/15/24 1902)   sodium chloride 0.9 % CT scan flush use (98 mLs Intravenous $Given 9/15/24 1902)   furosemide (LASIX) injection 60 mg (60 mg Intravenous $Given 9/15/24 2044)   heparin loading dose for LOW INTENSITY TREATMENT * Give BEFORE starting heparin infusion (5,700 Units Intravenous $Given 9/15/24 2045)       Procedures    Procedures     Discussion of Management   Admitting HospitalistMelissa    ED Course   ED Course as of 09/15/24 2335   Sun Sep 15, 2024   1730 I have obtained history and evaluated the patient.      1925 I spoke to Dr. Torres, Hopsitalist    1935 I updated the patient on results and discussed plan of care.         Additional Documentation  None    Medical Decision Making / Diagnosis     CMS Diagnoses:   The patient has signs of Severe Sepsis as evidenced by:    1. 2 SIRS criteria, AND  2. Suspected infection, AND   3. Organ dysfunction: Lactic Acidosis with value >2.0    Time severe sepsis diagnosis confirmed: 1729  09/15/24 as this was the time when Lactate resulted, and the level was > 2.0    3 Hour Severe Sepsis Bundle Completion:    1. Initial Lactic Acid Result:   Recent Labs   Lab Test 09/15/24  2005 09/15/24  1837 09/15/24  1729   LACT 1.9 1.0 2.7*     2. Blood Cultures before Antibiotics: No, antibiotics were started prior to BCx collection b/c waiting for BCx to be collected would have been detrimental to the patient  Note: Due to a national blood culture bottle shortage, reduced blood cultures may have been drawn on this patient.  3. Broad Spectrum Antibiotics Administered:  yes       Anti-infectives (From admission through now)      Start     Dose/Rate Route Frequency Ordered Stop    09/15/24 1750  doxycycline (VIBRAMYCIN) 100 mg vial to attach to  mL bag         100 mg  over 1-2 Hours Intravenous ONCE 09/15/24 1748 09/15/24 1858    09/15/24 1740  cefTRIAXone (ROCEPHIN) 2 g vial to attach to  ml bag for ADULTS or NS 50 ml bag for PEDS         2 g  over 30 Minutes Intravenous ONCE 09/15/24 1739 09/15/24 1816            4. Is initial hypotension present?     No (IV fluid bolus NOT required). IV Fluid volume administered: 500 ml with EMS, additional 500cc while in the ED for a total of 1L NS.                  Severe Sepsis reassessment:  1. Repeat Lactic Acid Level within 6 hours of time  zero: 1.0  2. MAP>65 after initial IVF bolus, will continue to monitor fluid status and vital signs    I attest to having performed a repeat sepsis exam and assessment of perfusion at 2100 and the results demonstrate no change.    MIPS   CT for PE was ordered because the patient had an abnormal d-dimer.    TINY Drew is a 59 year old female with a history of smoking who presents with shortness of breath suddenly starting this afternoon.  Patient initially had significant inspiratory and expiratory wheezing for EMS and so was given Solu-Medrol, magnesium, terbutaline, and 500 cc of normal saline.  She had received 3 DuoNebs/albuterol nebs en route.  On arrival, patient with ongoing expiratory wheezing bilaterally.  She is requiring 4 L of oxygen by nasal cannula.  Patient's initial EKG showed no concerning ischemic changes and patient was not having chest pain today.  Her initial troponin was elevated at 129.  There is been a significant dynamic elevation to 335 while here in the emergency department on a repeat 2-hour troponin.  Patient was initiated on heparin out of concern for NSTEMI in the setting of a possible reactive airway disease exacerbation.  Given her history of smoking I was concerned for potential COPD exacerbation given ongoing wheezing.  She had already received multiple medications prior to arrival in terms of steroids and nebulizer treatments.  Patient did have CT imaging of the chest obtained which showed no evidence of pulmonary embolism although moderate interstitial lung edema concerning for acute congestive heart failure.  Patient's BNP noted at 69 although given presence of edema on CT imaging did elect to give the patient 60 mg IV Lasix to help with concerns for acute CHF.  I suspect patient's presentation is multifactorial in nature.  Given concerns for NSTEMI patient remains on heparin at this time.  Given dynamic troponins change the hospitalist service has ordered an emergent  echocardiogram.  Plan for admission under the care of the hospitalist service for continued management of acute respiratory failure with hypoxia, suspected acute heart failure exacerbation, reactive airway disease exacerbation, and NSTEMI.    Critical Care time was 40 minutes for this patient excluding procedures.    Disposition   The patient was admitted to the hospital.     Diagnosis     ICD-10-CM    1. Acute respiratory failure with hypoxia (H)  J96.01       2. SOB (shortness of breath)  R06.02       3. NSTEMI (non-ST elevated myocardial infarction) (H)  I21.4       4. Acute congestive heart failure, unspecified heart failure type (H)  I50.9            Discharge Medications   New Prescriptions    No medications on file         Scribe Disclosure:  I, Teresa Hoyt, am serving as a scribe at 5:30 PM on 9/15/2024 to document services personally performed by Pato Ardon MD based on my observations and the provider's statements to me.        Pato Ardon MD  09/15/24 5236

## 2024-09-15 NOTE — ED NOTES
[facility]  ED Nurse Handoff Report    ED Chief complaint: Shortness of Breath      ED Diagnosis:   Final diagnoses:   Acute respiratory failure with hypoxia (H)   SOB (shortness of breath)       Code Status: TBD by admitting physician    Allergies: No Known Allergies    Patient Story: BIBA from home for SOB started suddenly this afternoon around 1500. RA O2 w/ EMS 81%, given 2g magnesium, 0.3mg terbutaline, 125mg solumedrol, 500mL NS, x2 duoneb, and x1 albuterol PTA. ODONNELL, labored breathing, and O2 84% on RA upon arrival to ED- placed on 6L NC with improvement to 93%. Cigarette smoker but no hx COPD or asthma.   Focused Assessment:  Patient's breathing improved following x1 duoneb in ED and rest, but respirations still mildly shallow and tachypneic. On 4L O2 w/ NC. Dimer elevated, CT PE pending. Troponin elevated at 129, aspirin given. Initial lactic 2.7, repeat 1.0. Denies pain or fevers. Neuro intact, pleasant, A/Ox4.     Treatments and/or interventions provided:   Labs Ordered and Resulted from Time of ED Arrival to Time of ED Departure   BASIC METABOLIC PANEL - Abnormal       Result Value    Sodium 138      Potassium 3.6      Chloride 99      Carbon Dioxide (CO2) 26      Anion Gap 13      Urea Nitrogen 6.8 (*)     Creatinine 0.86      GFR Estimate 77      Calcium 8.9      Glucose 185 (*)    D DIMER QUANTITATIVE - Abnormal    D-Dimer Quantitative 0.57 (*)    TSH WITH FREE T4 REFLEX - Abnormal    TSH 5.92 (*)    MAGNESIUM - Abnormal    Magnesium 3.0 (*)    ISTAT GASES LACTATE VENOUS POCT - Abnormal    Lactic Acid POCT 2.7 (*)     Bicarbonate Venous POCT 28      O2 Sat, Venous POCT 25 (*)     pCO2 Venous POCT 62 (*)     pH Venous POCT 7.26 (*)     pO2 Venous POCT 20 (*)     Base Excess/Deficit (+/-) POCT 0.0     NT PROBNP INPATIENT - Normal    N terminal Pro BNP Inpatient 69     CBC WITH PLATELETS AND DIFFERENTIAL    WBC Count 9.1      RBC Count 4.51      Hemoglobin 13.6      Hematocrit 41.0       MCV 91      MCH 30.2      MCHC 33.2      RDW 12.9      Platelet Count 201      % Neutrophils 67      % Lymphocytes 24      % Monocytes 6      % Eosinophils 2      % Basophils 0      % Immature Granulocytes 0      NRBCs per 100 WBC 0      Absolute Neutrophils 6.0      Absolute Lymphocytes 2.2      Absolute Monocytes 0.5      Absolute Eosinophils 0.2      Absolute Basophils 0.0      Absolute Immature Granulocytes 0.0      Absolute NRBCs 0.0     TROPONIN T, HIGH SENSITIVITY   T4 FREE      Medications   cefTRIAXone (ROCEPHIN) 2 g vial to attach to  ml bag for ADULTS or NS 50 ml bag for PEDS (2 g Intravenous $New Bag 9/15/24 8637)   doxycycline (VIBRAMYCIN) 100 mg vial to attach to  mL bag (100 mg Intravenous $New Bag 9/15/24 3677)   ipratropium - albuterol 0.5 mg/2.5 mg/3 mL (DUONEB) 0.5-2.5 (3) MG/3ML neb solution (3 mLs  $Given 9/15/24 7089)   sodium chloride 0.9% BOLUS 500 mL (500 mLs Intravenous $New Bag 9/15/24 5875)         CT Chest Pulmonary Embolism w Contrast    (Results Pending)      Patient's response to treatments and/or interventions: Tolerated, improved    To be done/followed up on inpatient unit:  Continue to monitor, see inpatient orders    Does this patient have any cognitive concerns?:  None    Activity level - Baseline/Home:  Independent, lives alone  Activity Level - Current:   Unknown, have not ambulated in ED d/t ODONNELL    Patient's Preferred language: English   Needed?: No    Isolation: None  Infection: Not Applicable  Patient tested for COVID 19 prior to admission: NO  Bariatric?: No    Vital Signs:   Vitals:    09/15/24 1715 09/15/24 1727 09/15/24 1730 09/15/24 1800   BP: 120/87 103/83 104/76 110/79   Pulse: 103 96 93 90   Resp: 16 29 24 13   Temp:       TempSrc:       SpO2: 91% 94% 93% 94%   Weight:       Height:           Cardiac Rhythm:     Was the PSS-3 completed:   Yes  What interventions are required if any?               Family Comments: Patient speaking to mother on  private cell phone  OBS brochure/video discussed/provided to patient/family: N/A               For the majority of the shift this patient's behavior was Green.   Behavioral interventions performed were N/A.    ED NURSE PHONE NUMBER: [phone]

## 2024-09-16 ENCOUNTER — APPOINTMENT (OUTPATIENT)
Dept: CARDIOLOGY | Facility: CLINIC | Age: 59
DRG: 280 | End: 2024-09-16
Attending: INTERNAL MEDICINE
Payer: MEDICARE

## 2024-09-16 LAB
ANION GAP SERPL CALCULATED.3IONS-SCNC: 13 MMOL/L (ref 7–15)
BUN SERPL-MCNC: 10.4 MG/DL (ref 8–23)
CALCIUM SERPL-MCNC: 9.4 MG/DL (ref 8.8–10.4)
CHLORIDE SERPL-SCNC: 104 MMOL/L (ref 98–107)
CREAT SERPL-MCNC: 0.73 MG/DL (ref 0.51–0.95)
EGFRCR SERPLBLD CKD-EPI 2021: >90 ML/MIN/1.73M2
ERYTHROCYTE [DISTWIDTH] IN BLOOD BY AUTOMATED COUNT: 13.1 % (ref 10–15)
GLUCOSE BLDC GLUCOMTR-MCNC: 127 MG/DL (ref 70–99)
GLUCOSE SERPL-MCNC: 175 MG/DL (ref 70–99)
HCO3 SERPL-SCNC: 22 MMOL/L (ref 22–29)
HCT VFR BLD AUTO: 37.5 % (ref 35–47)
HGB BLD-MCNC: 12.7 G/DL (ref 11.7–15.7)
LVEF ECHO: NORMAL
MCH RBC QN AUTO: 30 PG (ref 26.5–33)
MCHC RBC AUTO-ENTMCNC: 33.9 G/DL (ref 31.5–36.5)
MCV RBC AUTO: 88 FL (ref 78–100)
PLATELET # BLD AUTO: 220 10E3/UL (ref 150–450)
POTASSIUM SERPL-SCNC: 4.4 MMOL/L (ref 3.4–5.3)
RBC # BLD AUTO: 4.24 10E6/UL (ref 3.8–5.2)
SODIUM SERPL-SCNC: 139 MMOL/L (ref 135–145)
TROPONIN T SERPL HS-MCNC: 230 NG/L
UFH PPP CHRO-ACNC: 0.14 IU/ML
UFH PPP CHRO-ACNC: 0.53 IU/ML
WBC # BLD AUTO: 14.7 10E3/UL (ref 4–11)

## 2024-09-16 PROCEDURE — 272N000001 HC OR GENERAL SUPPLY STERILE: Performed by: INTERNAL MEDICINE

## 2024-09-16 PROCEDURE — 36415 COLL VENOUS BLD VENIPUNCTURE: CPT | Performed by: INTERNAL MEDICINE

## 2024-09-16 PROCEDURE — 250N000011 HC RX IP 250 OP 636: Performed by: INTERNAL MEDICINE

## 2024-09-16 PROCEDURE — 85520 HEPARIN ASSAY: CPT | Performed by: INTERNAL MEDICINE

## 2024-09-16 PROCEDURE — 99152 MOD SED SAME PHYS/QHP 5/>YRS: CPT | Performed by: INTERNAL MEDICINE

## 2024-09-16 PROCEDURE — 99233 SBSQ HOSP IP/OBS HIGH 50: CPT | Performed by: STUDENT IN AN ORGANIZED HEALTH CARE EDUCATION/TRAINING PROGRAM

## 2024-09-16 PROCEDURE — 93458 L HRT ARTERY/VENTRICLE ANGIO: CPT | Mod: 26 | Performed by: INTERNAL MEDICINE

## 2024-09-16 PROCEDURE — 258N000003 HC RX IP 258 OP 636: Performed by: INTERNAL MEDICINE

## 2024-09-16 PROCEDURE — 210N000002 HC R&B HEART CARE

## 2024-09-16 PROCEDURE — 250N000013 HC RX MED GY IP 250 OP 250 PS 637: Performed by: INTERNAL MEDICINE

## 2024-09-16 PROCEDURE — 99223 1ST HOSP IP/OBS HIGH 75: CPT | Mod: 25 | Performed by: INTERNAL MEDICINE

## 2024-09-16 PROCEDURE — 85520 HEPARIN ASSAY: CPT | Performed by: EMERGENCY MEDICINE

## 2024-09-16 PROCEDURE — 94640 AIRWAY INHALATION TREATMENT: CPT | Mod: 76

## 2024-09-16 PROCEDURE — 93458 L HRT ARTERY/VENTRICLE ANGIO: CPT | Performed by: INTERNAL MEDICINE

## 2024-09-16 PROCEDURE — 99153 MOD SED SAME PHYS/QHP EA: CPT | Performed by: INTERNAL MEDICINE

## 2024-09-16 PROCEDURE — 93306 TTE W/DOPPLER COMPLETE: CPT

## 2024-09-16 PROCEDURE — 94640 AIRWAY INHALATION TREATMENT: CPT

## 2024-09-16 PROCEDURE — 999N000157 HC STATISTIC RCP TIME EA 10 MIN

## 2024-09-16 PROCEDURE — 99152 MOD SED SAME PHYS/QHP 5/>YRS: CPT | Mod: GC | Performed by: INTERNAL MEDICINE

## 2024-09-16 PROCEDURE — 84484 ASSAY OF TROPONIN QUANT: CPT | Performed by: INTERNAL MEDICINE

## 2024-09-16 PROCEDURE — 80048 BASIC METABOLIC PNL TOTAL CA: CPT | Performed by: INTERNAL MEDICINE

## 2024-09-16 PROCEDURE — 85027 COMPLETE CBC AUTOMATED: CPT | Performed by: INTERNAL MEDICINE

## 2024-09-16 PROCEDURE — 250N000009 HC RX 250: Performed by: INTERNAL MEDICINE

## 2024-09-16 PROCEDURE — 93306 TTE W/DOPPLER COMPLETE: CPT | Mod: 26 | Performed by: INTERNAL MEDICINE

## 2024-09-16 RX ORDER — FUROSEMIDE 10 MG/ML
40 INJECTION INTRAMUSCULAR; INTRAVENOUS ONCE
Status: COMPLETED | OUTPATIENT
Start: 2024-09-16 | End: 2024-09-16

## 2024-09-16 RX ORDER — POTASSIUM CHLORIDE 1500 MG/1
20 TABLET, EXTENDED RELEASE ORAL
Status: DISCONTINUED | OUTPATIENT
Start: 2024-09-16 | End: 2024-09-16 | Stop reason: HOSPADM

## 2024-09-16 RX ORDER — FENTANYL CITRATE 50 UG/ML
INJECTION, SOLUTION INTRAMUSCULAR; INTRAVENOUS
Status: DISCONTINUED | OUTPATIENT
Start: 2024-09-16 | End: 2024-09-16 | Stop reason: HOSPADM

## 2024-09-16 RX ORDER — NALOXONE HYDROCHLORIDE 0.4 MG/ML
0.4 INJECTION, SOLUTION INTRAMUSCULAR; INTRAVENOUS; SUBCUTANEOUS
Status: ACTIVE | OUTPATIENT
Start: 2024-09-16 | End: 2024-09-16

## 2024-09-16 RX ORDER — SODIUM CHLORIDE 9 MG/ML
75 INJECTION, SOLUTION INTRAVENOUS CONTINUOUS
Status: ACTIVE | OUTPATIENT
Start: 2024-09-16 | End: 2024-09-16

## 2024-09-16 RX ORDER — LIDOCAINE 40 MG/G
CREAM TOPICAL
Status: DISCONTINUED | OUTPATIENT
Start: 2024-09-16 | End: 2024-09-16

## 2024-09-16 RX ORDER — IOPAMIDOL 755 MG/ML
INJECTION, SOLUTION INTRAVASCULAR
Status: DISCONTINUED | OUTPATIENT
Start: 2024-09-16 | End: 2024-09-16 | Stop reason: HOSPADM

## 2024-09-16 RX ORDER — SODIUM CHLORIDE 9 MG/ML
INJECTION, SOLUTION INTRAVENOUS CONTINUOUS
Status: DISCONTINUED | OUTPATIENT
Start: 2024-09-16 | End: 2024-09-16 | Stop reason: HOSPADM

## 2024-09-16 RX ORDER — OXYCODONE HYDROCHLORIDE 5 MG/1
5 TABLET ORAL EVERY 4 HOURS PRN
Status: DISCONTINUED | OUTPATIENT
Start: 2024-09-16 | End: 2024-09-17 | Stop reason: HOSPADM

## 2024-09-16 RX ORDER — LORAZEPAM 0.5 MG/1
0.5 TABLET ORAL
Status: DISCONTINUED | OUTPATIENT
Start: 2024-09-16 | End: 2024-09-16 | Stop reason: HOSPADM

## 2024-09-16 RX ORDER — ATROPINE SULFATE 0.1 MG/ML
0.5 INJECTION INTRAVENOUS
Status: ACTIVE | OUTPATIENT
Start: 2024-09-16 | End: 2024-09-16

## 2024-09-16 RX ORDER — FLUMAZENIL 0.1 MG/ML
0.2 INJECTION, SOLUTION INTRAVENOUS
Status: ACTIVE | OUTPATIENT
Start: 2024-09-16 | End: 2024-09-16

## 2024-09-16 RX ORDER — OXYCODONE HYDROCHLORIDE 5 MG/1
10 TABLET ORAL EVERY 4 HOURS PRN
Status: DISCONTINUED | OUTPATIENT
Start: 2024-09-16 | End: 2024-09-17 | Stop reason: HOSPADM

## 2024-09-16 RX ORDER — LORAZEPAM 2 MG/ML
0.5 INJECTION INTRAMUSCULAR
Status: DISCONTINUED | OUTPATIENT
Start: 2024-09-16 | End: 2024-09-16 | Stop reason: HOSPADM

## 2024-09-16 RX ORDER — ACETAMINOPHEN 325 MG/1
650 TABLET ORAL EVERY 4 HOURS PRN
Status: DISCONTINUED | OUTPATIENT
Start: 2024-09-16 | End: 2024-09-17

## 2024-09-16 RX ORDER — NALOXONE HYDROCHLORIDE 0.4 MG/ML
0.2 INJECTION, SOLUTION INTRAMUSCULAR; INTRAVENOUS; SUBCUTANEOUS
Status: ACTIVE | OUTPATIENT
Start: 2024-09-16 | End: 2024-09-16

## 2024-09-16 RX ADMIN — SODIUM CHLORIDE 75 ML/HR: 9 INJECTION, SOLUTION INTRAVENOUS at 15:19

## 2024-09-16 RX ADMIN — SODIUM CHLORIDE: 9 INJECTION, SOLUTION INTRAVENOUS at 14:09

## 2024-09-16 RX ADMIN — ROSUVASTATIN CALCIUM 10 MG: 10 TABLET, FILM COATED ORAL at 09:17

## 2024-09-16 RX ADMIN — IPRATROPIUM BROMIDE AND ALBUTEROL SULFATE 3 ML: .5; 3 SOLUTION RESPIRATORY (INHALATION) at 07:49

## 2024-09-16 RX ADMIN — MIRTAZAPINE 45 MG: 45 TABLET, FILM COATED ORAL at 01:05

## 2024-09-16 RX ADMIN — GABAPENTIN 300 MG: 300 CAPSULE ORAL at 09:17

## 2024-09-16 RX ADMIN — IPRATROPIUM BROMIDE AND ALBUTEROL SULFATE 3 ML: .5; 3 SOLUTION RESPIRATORY (INHALATION) at 21:01

## 2024-09-16 RX ADMIN — DISULFIRAM 250 MG: 250 TABLET ORAL at 10:50

## 2024-09-16 RX ADMIN — IPRATROPIUM BROMIDE AND ALBUTEROL SULFATE 3 ML: .5; 3 SOLUTION RESPIRATORY (INHALATION) at 10:27

## 2024-09-16 RX ADMIN — GABAPENTIN 300 MG: 300 CAPSULE ORAL at 20:29

## 2024-09-16 RX ADMIN — PAROXETINE HYDROCHLORIDE 40 MG: 20 TABLET, FILM COATED ORAL at 09:17

## 2024-09-16 RX ADMIN — FUROSEMIDE 40 MG: 10 INJECTION, SOLUTION INTRAMUSCULAR; INTRAVENOUS at 10:53

## 2024-09-16 RX ADMIN — ASPIRIN 81 MG: 81 TABLET, COATED ORAL at 09:17

## 2024-09-16 RX ADMIN — DOXYCYCLINE HYCLATE 100 MG: 100 CAPSULE ORAL at 09:17

## 2024-09-16 RX ADMIN — HEPARIN SODIUM 1100 UNITS/HR: 10000 INJECTION, SOLUTION INTRAVENOUS at 10:50

## 2024-09-16 ASSESSMENT — ACTIVITIES OF DAILY LIVING (ADL)
ADLS_ACUITY_SCORE: 39
ADLS_ACUITY_SCORE: 39
ADLS_ACUITY_SCORE: 38
ADLS_ACUITY_SCORE: 39
ADLS_ACUITY_SCORE: 35
ADLS_ACUITY_SCORE: 35
ADLS_ACUITY_SCORE: 38
ADLS_ACUITY_SCORE: 39
ADLS_ACUITY_SCORE: 39
ADLS_ACUITY_SCORE: 38
ADLS_ACUITY_SCORE: 39
ADLS_ACUITY_SCORE: 38
ADLS_ACUITY_SCORE: 39
ADLS_ACUITY_SCORE: 38
ADLS_ACUITY_SCORE: 39
ADLS_ACUITY_SCORE: 39
ADLS_ACUITY_SCORE: 38
ADLS_ACUITY_SCORE: 35
ADLS_ACUITY_SCORE: 39

## 2024-09-16 NOTE — CONSULTS
Inpatient Cardiology Consultation.   [facility]  Date of Admission: 9/15/2024  Date of Consult: September 16, 2024        DIAGNOSES/ASSESSMENT:  Non-ST elevation myocardial infarction with typical rise and fall of troponin T consistent with myocardial injury.  Needs heart catheterization for clarification.  Acute shortness of breath and pulmonary edema on chest CT.  Surprisingly, her NT proBNP is normal.  However left ventricular filling pressures are elevated and IVC is dilated on echocardiogram with preserved biventricular systolic function and no significant valve disease.  Heart failure is possible.  NT proBNP can be falsely low in obese patients.  Will prescribe IV furosemide.  Tobacco and recreational marijuana dependence syndrome.  Not contemplative of quitting.  Obesity with BMI of 38.  Probable sleep disordered breathing.  Needs outpatient sleep clinic evaluation.    PLAN:  I have ordered urgent heart catheterization and intervention, as needed.  Explained risks (MI, stroke, death, emergent heart surgery, vascular complications and bleeding) and benefits (diagnostic clarification, potential treatment of coronary disease). Informed consent obtained.  No history of contrast allergy.  No contraindication to dual antiplatelet therapy.  Normal renal function and CBC. Full code resuscitation status.    Please continue IV heparin, aspirin  and statin.  I have ordered IV furosemide 40 mg once.  I personally updated patient's RN with the plan.  Smoking cessation counseling.  Not contemplative of quitting.  Cardiology will follow.  Thank you for consulting us.      Kike Stoddard MD, MD [facility]  Cardiology      High complexity medical decision making and care coordination.  Total time today 85 minutes.    CODE STATUS:  Full Code      REASON FOR CONSULT:  NSTEMI.    HISTORY OF PRESENT ILLNESS:  Steph Drew is a 59 year old female with a history of hypertension, current tobacco and  marijuana dependence syndrome, anxiety, alcoholic peripheral neuropathy, prior history of alcohol dependence but sober for a year, who presented to the ED for an evaluation of shortness of breath that started abruptly yesterday during sexual intimacy, EMS was called, she was hypoxemic with sats of 80% when they arrived with no response to inhaled bronchodilators.  Cardiology is consulted due to finding of pulmonary edema on CT chest and elevated troponins.    In the ER she was initially normotensive, subsequently became hypotensive to 86/60 requiring IV fluid bolus to which she responded.  ECG showed normal sinus rhythm with a prolonged QTc of 490 ms, sinus rate 93 bpm, normal ID, no ischemia or infarct. Her serial high-sensitivity troponins are elevated with a rise and fall pattern consistent with myocardial injury with an initial troponin T of 129, subsequent 335, 261, 230.  Normal creatinine of 0.86, normal electrolytes, normal lactic acid, normal NT-proBNP of 69, normal CBC with a hemoglobin of 13.6.  CT chest ruled out pulmonary embolus or aortopathy.  Moderate pulmonary edema.      Patient denies any chest pain either now or leading up to the hospitalization.  She is still short of breath and not responding to inhaled bronchodilators.    Urgent transthoracic echocardiogram obtained and personally reviewed.  Shows normal left ventricular size and systolic function, normal left ventricular wall thickness, LVEF 55 to 60%, no regional wall motion abnormalities.  Normal right ventricular size and systolic function.  Left ventricular filling pressures are elevated along with a dilated inferior vena cava.  No significant valve disease.  No obvious aortopathy.    On exam - /99 mmHg, pulse 84, regular heart sounds, no murmur, no carotid bruit.  Challenging exam due to body habitus.  Cannot assess JVP.  Lung auscultation unreliable.  No lower extremity edema.    No family history of premature coronary artery  disease, cardiomyopathy or arrhythmias.      REVIEW OF SYSTEMS:  A comprehensive 10 point review of systems was completed and the pertinent positives are documented in history of present illness.    FAMILY HISTORY:  Family History   Problem Relation Age of Onset    Family History Negative Mother         Good Health    Family History Negative Father         Good Health    Family History Negative Sister         Good Health    Family History Negative Brother         Good Health    Family History Negative Brother         Good Health    Family History Negative Brother         Good Health       MEDICATIONS:  Prior to Admission Medications   Prescriptions Last Dose Informant Patient Reported? Taking?   PARoxetine (PAXIL) 20 MG tablet 9/15/2024 at am  Yes Yes   Sig: Take 40 mg by mouth every morning.   cloNIDine (CATAPRES) 0.1 MG tablet 9/15/2024 at x2  Yes Yes   Sig: Take 0.05 mg by mouth 2 times daily.   cloNIDine (CATAPRES) 0.1 MG tablet Past Week  Yes Yes   Sig: Take 0.05 mg by mouth daily as needed (anxiety). In addition to 0.05 mg twice daily   disulfiram (ANTABUSE) 250 MG tablet 9/15/2024 at am  No Yes   Sig: Take 1 tablet (250 mg) by mouth daily   gabapentin (NEURONTIN) 300 MG capsule 9/15/2024 at x2  No Yes   Sig: Take 1 capsule (300 mg) by mouth 3 times daily   Patient taking differently: Take 300 mg by mouth 2 times daily.   gabapentin (NEURONTIN) 300 MG capsule Past Week  Yes Yes   Sig: Take 300 mg by mouth daily as needed for other (anxiety). In addition to 300mg twice daily   mirtazapine (REMERON) 45 MG tablet 9/14/2024 at hs  No Yes   Sig: Take 1 tablet (45 mg) by mouth At Bedtime   multivitamin w/minerals (THERA-VIT-M) tablet 9/15/2024 at am  No Yes   Sig: Take 1 tablet by mouth daily   rosuvastatin (CRESTOR) 10 MG tablet 9/15/2024 at am  Yes Yes   Sig: Take 10 mg by mouth daily.      Facility-Administered Medications: None       HOME MEDICATIONS:  Prior to Admission Medications   Prescriptions Last Dose  Informant Patient Reported? Taking?   PARoxetine (PAXIL) 20 MG tablet 9/15/2024 at am  Yes Yes   Sig: Take 40 mg by mouth every morning.   cloNIDine (CATAPRES) 0.1 MG tablet 9/15/2024 at x2  Yes Yes   Sig: Take 0.05 mg by mouth 2 times daily.   cloNIDine (CATAPRES) 0.1 MG tablet Past Week  Yes Yes   Sig: Take 0.05 mg by mouth daily as needed (anxiety). In addition to 0.05 mg twice daily   disulfiram (ANTABUSE) 250 MG tablet 9/15/2024 at am  No Yes   Sig: Take 1 tablet (250 mg) by mouth daily   gabapentin (NEURONTIN) 300 MG capsule 9/15/2024 at x2  No Yes   Sig: Take 1 capsule (300 mg) by mouth 3 times daily   Patient taking differently: Take 300 mg by mouth 2 times daily.   gabapentin (NEURONTIN) 300 MG capsule Past Week  Yes Yes   Sig: Take 300 mg by mouth daily as needed for other (anxiety). In addition to 300mg twice daily   mirtazapine (REMERON) 45 MG tablet 9/14/2024 at hs  No Yes   Sig: Take 1 tablet (45 mg) by mouth At Bedtime   multivitamin w/minerals (THERA-VIT-M) tablet 9/15/2024 at am  No Yes   Sig: Take 1 tablet by mouth daily   rosuvastatin (CRESTOR) 10 MG tablet 9/15/2024 at am  Yes Yes   Sig: Take 10 mg by mouth daily.      Facility-Administered Medications: None       ALLERGIES:  No Known Allergies    PAST MEDICAL HISTORY:  Past Medical History:   Diagnosis Date    Benign essential hypertension     Drug dependence (H)     2004, treatment at [facility]    History of other genital system and obstetric disorders     G2, P0-0-2-0    History of other infectious or parasitic disease     Childhood    Nondependent alcohol abuse, in remission     No Comments Provided       PAST SURGICAL HISTORY:  Past Surgical History:   Procedure Laterality Date    APPENDECTOMY OPEN      Age 17    TONSILLECTOMY, ADENOIDECTOMY, COMBINED      Age 8       SOCIAL HISTORY:   Steph Drew  reports that she has been smoking. She has never used smokeless tobacco. She reports current alcohol use. She reports current drug use.  "Drug: Marijuana.      PHYSICAL EXAMINATION:  Temp: 98.4  F (36.9  C) Temp src: Oral BP: (!) 140/99 Pulse: 84   Resp: 16 SpO2: 96 % O2 Device: None (Room air) Oxygen Delivery: 2 LPM  09/11 0700 - 09/16 0659  In: 1200 [I.V.:500]  Out: -   Net: 1200  Vitals:    09/15/24 1714 09/15/24 2054 09/16/24 0146   Weight: 95.3 kg (210 lb) 104.8 kg (231 lb) 102.5 kg (226 lb)         Clinically Significant Risk Factors Present on Admission                  # Hypertension: Home medication list includes antihypertensive(s)         # Obesity: Estimated body mass index is 36.48 kg/m  as calculated from the following:    Height as of this encounter: 1.676 m (5' 6\").    Weight as of this encounter: 102.5 kg (226 lb).             Diastolic acute        Mothilal Jessica Stoddard MD, MD    This note was completed in part using dictation via the Dragon voice recognition software. Some word and grammatical errors may occur and must be interpreted in the appropriate clinical context. If there are any questions pertaining to this issue, please contact me for further clarification.       "

## 2024-09-16 NOTE — PROGRESS NOTES
RECEIVING UNIT ED HANDOFF REVIEW    ED Nurse Handoff Report was reviewed by: Christiano Kent RN on September 16, 2024 at 1:58 AM

## 2024-09-16 NOTE — H&P
[facility]    History and Physical - Hospitalist Service       Date of Admission:  9/15/2024    Assessment & Plan      Steph Drew is a 59 year old female admitted on 9/15/2024.  Patient is admitted for management of acute hypoxic respiratory failure, NSTEMI.    *Patient was previously hospitalized in 9/2021 at [facility] for acute worsening shortness of breath.  At that time she was treated for pneumonia.  She was also noted to have elevated troponin which she had extensive cardiac workup.  Coronary angiogram in 9/2021 showed no significant coronary artery disease.  She had cardiac MRI that showed myocarditis.      Acute hypoxic respiratory failure  Suspected reactive airway disease exacerbation, possible COPD  Possible pulmonary edema on imaging  Tobacco use disorder  *Presents with acute worsening of shortness of breath.  Noted to be hypoxic to the 80s by EMS.  She was given neb treatment, Solu-Medrol and route.  Hypoxic to 84% on room air in ED arrival.  Received neb treatments.  Oxygenation improved with 6 L supplemental oxygen which has since been weaned down to 4 L/min.  *D-dimer 0.57.  Lactic acid 2.7 down to 1. ProBNP 69, Trop 129  *CT chest--- negative for pulmonary embolism.  Moderate interstitial lung edema, consistent with congestive heart failure.  *Received terbutaline, DuoNeb, Solu-Medrol 125 mg by EMS  *Received ceftriaxone, doxycycline, DuoNeb in the ED   initiated on heparin for NSTEMI and Lasix ordered.   - COVID-19, RSV/influenza A/B pending  - duoneb QID  - solumedrol 40mg IV q8hrs x 3 doses, then likely to PO prednisone after that  - doxy bid for suspected copd exacerbation  - received Lasix in ED, reasses further need for lasix in AM, BNP is normal range  - echocardiogram obtained to assess cardiac function as below      NSTEMI  *EKG shows normal sinus rhythm.  Denies any chest pain.  Presented with acute worsening of shortness of breath as  "above  *Initial troponin 129. Trop 69. CT chest as above concerning for pulm edema. ? If dypnea anginal equivalent   *Coronary angiogram in 9/2021 through [facility]-- no significant coronary artery disease  *cMRI (9/2021)--showed myocarditis  *Has received aspirin and initiated on heparin drip in the emergency room.  -Continue heparin drip  -Aspirin 81 mg daily  -Resume PTA statin  - repeat troponin 335. Trend troponin q4hr  -Echocardiogram  -Cardiology consultation  -Telemetry    Subclinical hypothyroidism  *TSH 5.92, free T41.09  *This is likely secondary to acute illness.  Will need TSH check in 6 to 8 weeks as an outpatient      Alcohol use disorder in remission  *Reports she has been sober for 1 year.  Congratulated patient.  -Continue with PTA Antabuse    Generalized anxiety disorder  Major depressive disorder  -Continue with PTA Paroxetine, Remeron and gabapentin  - hold Clonidine tonight since bp in the low normal range and will be receiving Lasix        Diet:  Regular diet  DVT Prophylaxis: Heparin drip  Martin Catheter: Not present  Lines: None     Cardiac Monitoring: None  Code Status:  Full code, discussed with patient    Clinically Significant Risk Factors Present on Admission                  # Hypertension: Home medication list includes antihypertensive(s)         # Obesity: Estimated body mass index is 33.89 kg/m  as calculated from the following:    Height as of this encounter: 1.676 m (5' 6\").    Weight as of this encounter: 95.3 kg (210 lb).                    Disposition Plan     Medically Ready for Discharge: Anticipated in 2-4 Days           Nena Torres MD  Hospitalist Service  [facility]  Securely message with [employer] (more info)  Text page via [employer] Paging/Directory     ______________________________________________________________________    Chief Complaint   Shortness of breath    History is obtained from the patient, chart review and discussion with ED " provider    History of Present Illness   Steph Drew is a 59 year old female with a history of major depressive disorder, generalized anxiety disorder, alcohol use disorder in remission who is presenting for evaluation of shortness of breath. Patient reports she developed acute worsening shortness of breath while walking up stairs this afternoon. Patient reports at baseline she has dyspnea with exertion especially when she walks up the stairs which she attributes to her weight. However, she reports she just had trouble catching her breath while she was walking up the stairs. She denies chest pain. She denies nausea or emesis. Denies lightheaded. Denies fever or URI symptoms. She endorses chronic cough with no change in nature recently.     Patient smokes under 1PPD and has done so for decades per her report.  States no prior diagnosis of emphysema or COPD. No hx of asthma.     Per report, upon EMS evaluation she was hypoxic to 81and wheezing and shortness of breath.  She was treated with terbutaline, albuterol, Solu-Medrol and magnesium.     In the ED, sats 84% on RA, improved to 90s on 6L which has since been weaned down to 4L.   Troponin was noted to be elevated to 129.  proBNP 69.  CT of the chest was obtained which was negative for pulmonary embolism, but showed concerning signs for pulm edema.  She received ceftriaxone,  doxycycline, Lasix 60 mg x 1 and initiated on heparin drip.  Admission requested for further management of acute hypoxic respiratory failure, NSTEMI.    At time of evaluation, patient reports her dyspnea has improved and resting comfortably in the gurney..          Past Medical History    Past Medical History:   Diagnosis Date    Drug dependence (H)     2004, treatment at [facility]    History of other genital system and obstetric disorders     G2, P0-0-2-0    History of other infectious or parasitic disease     Childhood    Nondependent alcohol abuse, in remission     No Comments Provided        Past Surgical History   Past Surgical History:   Procedure Laterality Date    APPENDECTOMY OPEN      Age 17    TONSILLECTOMY, ADENOIDECTOMY, COMBINED      Age 8       Prior to Admission Medications   Prior to Admission Medications   Prescriptions Last Dose Informant Patient Reported? Taking?   PARoxetine (PAXIL) 20 MG tablet 9/15/2024 at am  Yes Yes   Sig: Take 40 mg by mouth every morning.   cloNIDine (CATAPRES) 0.1 MG tablet 9/15/2024 at x2  Yes Yes   Sig: Take 0.05 mg by mouth 2 times daily.   cloNIDine (CATAPRES) 0.1 MG tablet Past Week  Yes Yes   Sig: Take 0.05 mg by mouth daily as needed (anxiety). In addition to 0.05 mg twice daily   disulfiram (ANTABUSE) 250 MG tablet 9/15/2024 at am  No Yes   Sig: Take 1 tablet (250 mg) by mouth daily   gabapentin (NEURONTIN) 300 MG capsule 9/15/2024 at x2  No Yes   Sig: Take 1 capsule (300 mg) by mouth 3 times daily   Patient taking differently: Take 300 mg by mouth 2 times daily.   gabapentin (NEURONTIN) 300 MG capsule Past Week  Yes Yes   Sig: Take 300 mg by mouth daily as needed for other (anxiety). In addition to 300mg twice daily   mirtazapine (REMERON) 45 MG tablet 9/14/2024 at hs  No Yes   Sig: Take 1 tablet (45 mg) by mouth At Bedtime   multivitamin w/minerals (THERA-VIT-M) tablet 9/15/2024 at am  No Yes   Sig: Take 1 tablet by mouth daily   rosuvastatin (CRESTOR) 10 MG tablet 9/15/2024 at am  Yes Yes   Sig: Take 10 mg by mouth daily.      Facility-Administered Medications: None        Review of Systems    The 10 point Review of Systems is negative other than noted in the HPI      Physical Exam   Vital Signs: Temp: 97.8  F (36.6  C) Temp src: Oral BP: 104/77 Pulse: 83   Resp: 17 SpO2: 96 % O2 Device: Nasal cannula Oxygen Delivery: 4 LPM  Weight: 210 lbs 0 oz    General Appearance: alert, awake and no apparent distress  HEENT: NCAT, oral mucosa is moist, no pharyngeal erythema or exudates  Respiratory: Bibasilar crackles and diffuse expiratory  wheezing  Cardiovascular: Regular rate and rhythm  GI: Soft, nontender and nondistended  Skin: Warm and dry  Musculoskeletal: Normal muscle tone  Neurologic: Alert and oriented, moving all extremities  Psychiatric: Mood and affect are normal    Medical Decision Making       MANAGEMENT DISCUSSED with the following over the past 24 hours: Patient and family at bedside   NOTE(S)/MEDICAL RECORDS REVIEWED over the past 24 hours: Hospital visit  from 9/2021, previous cardiac catheterization from 2021  Tests ORDERED & REVIEWED in the past 24 hours:  - BMP  - CMP  - CBC  - BNP  - Ddimer  - Lactic Acid  - VBG  Tests personally interpreted in the past 24 hours:      CT chest as mentioned above    Data     I have personally reviewed the following data over the past 24 hrs:    9.1  \   13.6   / 201     138 99 6.8 (L) /  185 (H)   3.6 26 0.86 \     Trop: 129 (HH) BNP: 69     TSH: 5.92 (H) T4: 1.09 A1C: N/A     Procal: N/A CRP: N/A Lactic Acid: 1.0       INR:  N/A PTT:  N/A   D-dimer:  0.57 (H) Fibrinogen:  N/A       Imaging results reviewed over the past 24 hrs:   No results found for this or any previous visit (from the past 24 hour(s)).

## 2024-09-16 NOTE — PROGRESS NOTES
MD Notification    Notified Person: MD    Notified Person Name:Dr. Stoddard    Notification Date/Time:9/16/24 3345    Notification Interaction: [employer] messaging    Purpose of Notification: Pt back from angio. Heparin stopped. Did you want to discontinue the order?     Orders Received: MD ordered to discontinue heparin drip.

## 2024-09-16 NOTE — PROGRESS NOTES
Pt A&O X 4. VSS. NPO diet in preparation for angiogram. Takes pills whole. Up with SBA. Denies pain. Heparin gtt, adjusted rate based on MD order.  Pt scoring green on the Aggression Stop Light Tool. Angio completed. Heparin drip discontinued per cardiology. Right groin site, CDI. Advanced diet to cardiac regular diet, thin liquids. Will continue plan of care.

## 2024-09-16 NOTE — PROGRESS NOTES
[facility]    Medicine Progress Note - Hospitalist Service    Date of Admission:  9/15/2024    Assessment & Plan   Steph Drew is a 59 year old female admitted on 9/15/2024.  Patient is admitted for management of acute hypoxic respiratory failure, NSTEMI.     *Patient was previously hospitalized in 9/2021 at [facility] for acute worsening shortness of breath.  At that time she was treated for pneumonia.  She was also noted to have elevated troponin which she had extensive cardiac workup.  Coronary angiogram in 9/2021 showed no significant coronary artery disease.  She had cardiac MRI that showed myocarditis.     Acute hypoxic respiratory failure  Possible COPD  Possible pulmonary edema on imaging  Tobacco use disorder  NSTEMI  *Presents with acute worsening of shortness of breath.  Noted to be hypoxic to the 80s by EMS.  She was given neb treatment, Solu-Medrol and route.  Hypoxic to 84% on room air in ED arrival.  Received neb treatments.  Oxygenation improved with 6 L supplemental oxygen which has since been weaned down to 4 L/min.  *D-dimer 0.57.  Lactic acid 2.7 down to 1. ProBNP 69, Trop 129  *CT chest--- negative for pulmonary embolism.  Moderate interstitial lung edema, consistent with congestive heart failure.  *Received terbutaline, DuoNeb, Solu-Medrol 125 mg by EMS  *Received ceftriaxone, doxycycline, DuoNeb in the ED   initiated on heparin for NSTEMI and Lasix ordered.     - symptoms developed during exertion, previous episode in the past however this episode did not resolve with time  - rapid resolution in symptoms this morning. Hold further steroids and antibiotics  - agree with IV diuresis and angiogram  - continue heparin  - COVID-19, RSV/influenza A/B negative     NSTEMI  *EKG shows normal sinus rhythm.  Denies any chest pain.  Presented with acute worsening of shortness of breath as above  *Initial troponin 129. Trop 69. CT chest as above concerning  "for pulm edema. ? If dypnea anginal equivalent   *Coronary angiogram in 9/2021 through [facility]-- no significant coronary artery disease  *cMRI (9/2021)--showed myocarditis  *Has received aspirin and initiated on heparin drip in the emergency room.  -Continue heparin drip  -Aspirin 81 mg daily  -Resume PTA statin  -angiogram as above    Subclinical hypothyroidism  *TSH 5.92, free T41.09  *This is likely secondary to acute illness.  Will need TSH check in 6 to 8 weeks as an outpatient     Alcohol use disorder in remission  *Reports she has been sober for 1 year.  Congratulated patient.  -Continue with PTA Antabuse     Generalized anxiety disorder  Major depressive disorder  -Continue with PTA Paroxetine, Remeron and gabapentin  - hold Clonidine tonight since bp in the low normal range and will be receiving Lasix             Diet: NPO per Anesthesia Guidelines for Procedure/Surgery Except for: Meds    DVT Prophylaxis: Heparin gtt  Martin Catheter: Not present  Lines: None     Cardiac Monitoring: ACTIVE order. Indication: AMI (NSTEMI/ STEMI) (48 hours)  Code Status: Full Code      Clinically Significant Risk Factors Present on Admission                  # Hypertension: Home medication list includes antihypertensive(s)         # Obesity: Estimated body mass index is 36.48 kg/m  as calculated from the following:    Height as of this encounter: 1.676 m (5' 6\").    Weight as of this encounter: 102.5 kg (226 lb).                    Disposition Plan     Medically Ready for Discharge: Anticipated in 2-4 Days             Oliva Ramírez DO  Hospitalist Service  [facility]  Securely message with [employer] (more info)  Text page via [employer] Paging/Directory   ______________________________________________________________________    Interval History   Assumed care today. Patient reports being back to normal baseline. She denies chest pain during the events yesterday but she did have diaphoresis and her symptoms " continued. NO history of lung problems but she does continue to smoke 1 ppd    Physical Exam   Vital Signs: Temp: 98.4  F (36.9  C) Temp src: Oral BP: (!) 140/99 Pulse: 84   Resp: 16 SpO2: 96 % O2 Device: None (Room air) Oxygen Delivery: 2 LPM  Weight: 226 lbs 0 oz    Constitutional: Awake, alert, cooperative, no apparent distress  Respiratory: Clear to auscultation bilaterally, no crackles or wheezing  Cardiovascular: Regular rate and rhythm, normal S1 and S2, and no murmur noted  GI: Normal bowel sounds, soft, non-distended, non-tender  Skin/Integumen: No rashes, no cyanosis, no edema  Other:      Medical Decision Making       61 MINUTES SPENT BY ME on the date of service doing chart review, history, exam, documentation & further activities per the note.      Data     I have personally reviewed the following data over the past 24 hrs:    14.7 (H)  \   12.7   / 220     139 104 10.4 /  175 (H)   4.4 22 0.73 \     Trop: 230 (HH) BNP: 69     TSH: 5.92 (H) T4: 1.09 A1C: N/A     Procal: N/A CRP: N/A Lactic Acid: 1.9       INR:  N/A PTT:  N/A   D-dimer:  0.57 (H) Fibrinogen:  N/A       Imaging results reviewed over the past 24 hrs:   Recent Results (from the past 24 hour(s))   CT Chest Pulmonary Embolism w Contrast    Narrative    EXAM: CT CHEST PULMONARY EMBOLISM W CONTRAST  LOCATION: [facility]  DATE: 9/15/2024    INDICATION: SOB, elevated D dimer, cocnern for PE vs PNA vs COPD exacerbation vs NSTEMI  COMPARISON: None.  TECHNIQUE: CT chest pulmonary angiogram during arterial phase injection of IV contrast. Multiplanar reformats and MIP reconstructions were performed. Dose reduction techniques were used.   CONTRAST: 75mL Isovue 370    FINDINGS:    ANGIOGRAM CHEST: Pulmonary arteries are normal caliber and negative for pulmonary emboli. Normal caliber aortic root with preserved sinotubular junction. Normal caliber thoracic aorta with 2 vessel arch anatomy. No acute aortic syndrome.    LUNGS AND  PLEURA: There is smooth interlobular septal thickening symmetrically in the lung bases and apices along with peribronchial thickening consistent with moderate interstitial lung edema. No pleural effusions. No focal airspace opacities. Trachea   and central airways are patent.    MEDIASTINUM: Normal.    CORONARY ARTERY CALCIFICATION: Mild.    UPPER ABDOMEN: No actionable findings in the imaged upper abdomen.    MUSCULOSKELETAL: There are small mid and lower thoracic spine degenerative osteophytes minimal anterior wedging of T7. No aggressive or destructive bone lesions.      Impression    IMPRESSION:    1.  No acute pulmonary embolism or aortopathy.  2.  Moderate interstitial lung edema, consistent with acute congestive heart failure.    Echocardiogram Complete   Result Value    LVEF  55-60%    Narrative    [id number]  [id number]  [id number]  [id number]^AIXA^GENIA^KRISTINE     [facility]  Echocardiography Laboratory  [address] [place] [address]     Name: DEVAUGHN ALLAN  MRN: [id number]  : 1965  Study Date: 2024 09:27 AM  Age: 59 yrs  Gender: Female  Patient Location: [facility]  Reason For Study: MI, CHF  Ordering Physician: GENIA KRUSE  Referring Physician: Ariella Rajput [place]  Performed By: Ezequiel Arambula     BSA: 2.1 m2  Height: 66 in  Weight: 231 lb  HR: 69  BP: 140/99 mmHg  ______________________________________________________________________________  Procedure  Complete Portable Echo Adult. Technically difficult study.  ______________________________________________________________________________  Interpretation Summary     Left ventricular systolic function is normal.  The visual ejection fraction is 55-60%.  No regional wall motion abnormalities noted.  The right ventricle is normal in size and function.  No significant valve disease.  Dilated inferior vena cava.     There is no comparison study  available.  ______________________________________________________________________________  Left Ventricle  The left ventricle is normal in size. There is normal left ventricular wall  thickness. Left ventricular systolic function is normal. The visual ejection  fraction is 55-60%. Diastolic Doppler findings (E/E' ratio and/or other  parameters) suggest left ventricular filling pressures are increased. No  regional wall motion abnormalities noted.     Right Ventricle  The right ventricle is normal in size and function.     Atria  Normal left atrial size. Right atrial size is normal. There is no atrial shunt  seen.     Mitral Valve  The mitral valve leaflets appear normal. There is no evidence of stenosis,  fluttering, or prolapse. There is trace mitral regurgitation. There is no  mitral valve stenosis.     Tricuspid Valve  Normal tricuspid valve. There is trace tricuspid regurgitation. Right  ventricular systolic pressure could not be approximated due to inadequate  tricuspid regurgitation.     Aortic Valve  The aortic valve is not well visualized. No aortic regurgitation is present.  No aortic stenosis is present.     Pulmonic Valve  There is trace pulmonic valvular regurgitation.     Vessels  The aortic root is normal size. Normal size ascending aorta. Dilation of the  inferior vena cava is present with abnormal respiratory variation in diameter.     Pericardium  There is no pericardial effusion.     Rhythm  Sinus rhythm was noted.  ______________________________________________________________________________  MMode/2D Measurements & Calculations  IVSd: 1.1 cm     LVIDd: 5.1 cm  LVIDs: 4.1 cm  LVPWd: 1.0 cm  FS: 19.6 %  LV mass(C)d: 200.8 grams  LV mass(C)dI: 94.4 grams/m2  Ao root diam: 2.9 cm  asc Aorta Diam: 3.4 cm  LVOT diam: 1.8 cm  LVOT area: 2.5 cm2  Ao root diam index Ht(cm/m): 1.7  Ao root diam index BSA (cm/m2): 1.4  Asc Ao diam index BSA (cm/m2): 1.6  Asc Ao diam index Ht(cm/m): 2.0  LA Volume (BP):  45.3 ml     LA Volume Index (BP): 21.3 ml/m2  RV Base: 2.8 cm  RWT: 0.39  TAPSE: 2.4 cm     Doppler Measurements & Calculations  MV E max alvin: 139.0 cm/sec  MV A max alvin: 101.0 cm/sec  MV E/A: 1.4  MV dec slope: 573.0 cm/sec2  MV dec time: 0.24 sec  PA acc time: 0.12 sec  E/E' av.6  Lateral E/e': 16.0  Medial E/e': 21.3  RV S Alvin: 12.5 cm/sec     ______________________________________________________________________________  Report approved by: Dr Kike Oliveira 2024 10:23 AM

## 2024-09-16 NOTE — PLAN OF CARE
Goal Outcome Evaluation:      Plan of Care Reviewed With: patient    Overall Patient Progress: no changeOverall Patient Progress: no change    A/Ox4. VSS ex weaned to 1L NC. LS clear, ODONNELL. CMS intact ex numbness to BLE at baseline. Tele SR. Denies pain. Ambulating SBA. Purewick in pace overnight, adequate UOP. IVF Heparin gtt, recheck at 0930. NPO. Plan for Cardiology consult today. Discharge pending.

## 2024-09-17 VITALS
HEART RATE: 91 BPM | BODY MASS INDEX: 35.57 KG/M2 | HEIGHT: 66 IN | TEMPERATURE: 97.9 F | SYSTOLIC BLOOD PRESSURE: 147 MMHG | DIASTOLIC BLOOD PRESSURE: 91 MMHG | RESPIRATION RATE: 18 BRPM | WEIGHT: 221.3 LBS | OXYGEN SATURATION: 99 %

## 2024-09-17 LAB
ANION GAP SERPL CALCULATED.3IONS-SCNC: 10 MMOL/L (ref 7–15)
BUN SERPL-MCNC: 13.9 MG/DL (ref 8–23)
CALCIUM SERPL-MCNC: 9.1 MG/DL (ref 8.8–10.4)
CHLORIDE SERPL-SCNC: 101 MMOL/L (ref 98–107)
CREAT SERPL-MCNC: 0.84 MG/DL (ref 0.51–0.95)
EGFRCR SERPLBLD CKD-EPI 2021: 80 ML/MIN/1.73M2
GLUCOSE SERPL-MCNC: 132 MG/DL (ref 70–99)
HCO3 SERPL-SCNC: 27 MMOL/L (ref 22–29)
HOLD SPECIMEN: NORMAL
POTASSIUM SERPL-SCNC: 3.5 MMOL/L (ref 3.4–5.3)
SODIUM SERPL-SCNC: 138 MMOL/L (ref 135–145)

## 2024-09-17 PROCEDURE — B2151ZZ FLUOROSCOPY OF LEFT HEART USING LOW OSMOLAR CONTRAST: ICD-10-PCS | Performed by: INTERNAL MEDICINE

## 2024-09-17 PROCEDURE — 99239 HOSP IP/OBS DSCHRG MGMT >30: CPT | Performed by: STUDENT IN AN ORGANIZED HEALTH CARE EDUCATION/TRAINING PROGRAM

## 2024-09-17 PROCEDURE — 94640 AIRWAY INHALATION TREATMENT: CPT | Mod: 76

## 2024-09-17 PROCEDURE — 82374 ASSAY BLOOD CARBON DIOXIDE: CPT

## 2024-09-17 PROCEDURE — 36415 COLL VENOUS BLD VENIPUNCTURE: CPT

## 2024-09-17 PROCEDURE — 250N000013 HC RX MED GY IP 250 OP 250 PS 637: Performed by: INTERNAL MEDICINE

## 2024-09-17 PROCEDURE — 250N000009 HC RX 250: Performed by: INTERNAL MEDICINE

## 2024-09-17 PROCEDURE — 99233 SBSQ HOSP IP/OBS HIGH 50: CPT | Mod: FS

## 2024-09-17 PROCEDURE — 250N000013 HC RX MED GY IP 250 OP 250 PS 637

## 2024-09-17 PROCEDURE — B2111ZZ FLUOROSCOPY OF MULTIPLE CORONARY ARTERIES USING LOW OSMOLAR CONTRAST: ICD-10-PCS | Performed by: INTERNAL MEDICINE

## 2024-09-17 PROCEDURE — 999N000157 HC STATISTIC RCP TIME EA 10 MIN

## 2024-09-17 PROCEDURE — 94640 AIRWAY INHALATION TREATMENT: CPT

## 2024-09-17 RX ORDER — LOSARTAN POTASSIUM 25 MG/1
25 TABLET ORAL DAILY
Status: DISCONTINUED | OUTPATIENT
Start: 2024-09-17 | End: 2024-09-17 | Stop reason: HOSPADM

## 2024-09-17 RX ORDER — LOSARTAN POTASSIUM 25 MG/1
25 TABLET ORAL DAILY
Qty: 30 TABLET | Refills: 0 | Status: SHIPPED | OUTPATIENT
Start: 2024-09-17 | End: 2024-10-17

## 2024-09-17 RX ORDER — ASPIRIN 81 MG/1
81 TABLET ORAL DAILY
Qty: 30 TABLET | Refills: 0 | Status: SHIPPED | OUTPATIENT
Start: 2024-09-18 | End: 2024-10-18

## 2024-09-17 RX ADMIN — IPRATROPIUM BROMIDE AND ALBUTEROL SULFATE 3 ML: .5; 3 SOLUTION RESPIRATORY (INHALATION) at 12:02

## 2024-09-17 RX ADMIN — DISULFIRAM 250 MG: 250 TABLET ORAL at 08:30

## 2024-09-17 RX ADMIN — GABAPENTIN 300 MG: 300 CAPSULE ORAL at 08:30

## 2024-09-17 RX ADMIN — MIRTAZAPINE 45 MG: 45 TABLET, FILM COATED ORAL at 03:28

## 2024-09-17 RX ADMIN — PAROXETINE HYDROCHLORIDE 40 MG: 20 TABLET, FILM COATED ORAL at 08:30

## 2024-09-17 RX ADMIN — LOSARTAN POTASSIUM 25 MG: 25 TABLET, FILM COATED ORAL at 12:49

## 2024-09-17 RX ADMIN — ROSUVASTATIN CALCIUM 10 MG: 10 TABLET, FILM COATED ORAL at 08:30

## 2024-09-17 RX ADMIN — ASPIRIN 81 MG: 81 TABLET, COATED ORAL at 08:30

## 2024-09-17 RX ADMIN — IPRATROPIUM BROMIDE AND ALBUTEROL SULFATE 3 ML: .5; 3 SOLUTION RESPIRATORY (INHALATION) at 08:11

## 2024-09-17 ASSESSMENT — ACTIVITIES OF DAILY LIVING (ADL)
ADLS_ACUITY_SCORE: 38
ADLS_ACUITY_SCORE: 38
ADLS_ACUITY_SCORE: 40
ADLS_ACUITY_SCORE: 38
ADLS_ACUITY_SCORE: 38
ADLS_ACUITY_SCORE: 41
ADLS_ACUITY_SCORE: 38
ADLS_ACUITY_SCORE: 41
ADLS_ACUITY_SCORE: 38
ADLS_ACUITY_SCORE: 41
ADLS_ACUITY_SCORE: 38
ADLS_ACUITY_SCORE: 40

## 2024-09-17 NOTE — PLAN OF CARE
Goal Outcome Evaluation:                 Outcome Evaluation: Orientations: AOx4  Vitals/Pain: VSS RA - Denies pain  Tele: NSR  Lines/Drains: PIV SL   Skin/Wounds: Scattered bruising  GI/: Continent - Voiding adequately  Ambulation/Assist: Independent  Plan: Angio completed yesterday. Possible discharge today

## 2024-09-17 NOTE — PROGRESS NOTES
[facility]    ~Cardiology Progress Note~    Date of Admission: 9/15/2024  Service Date: 09/17/2024    Summary:  Ms. Steph Drew is a very pleasant 59 year old female with a past medical history of hypertension, current tobacco and marijuana dependence syndrome, anxiety, peripheral neuropathy, prior history of alcohol dependence but sober for a year who was admitted on 9/15/2024 for shortness of breath. Cardiology was consulted for the finding of pulmonary edema on CT chest and elevated troponins.     Interval September 17, 2024:  Denies shortness of breath or chest pain. Hypertensive this morning with -162. Anxious to discharge.     Telemetry reviewed- sinus rhythm          Assessment:     NSTEMI   Troponin peaked at 335  Coronary angiogram 9/16/2024- no significant CAD  Echo 9/16/2024- LVEF 55-60% with no WMA  Acute shortness of breath, resolved   HTN, sub optimal control  Tobacco and recreational marijuana, cessation encouraged   Obesity    Probable sleep disordered breathing            Plan:     Start losartan 25 mg daily   Recommend sleep evaluation in the outpatient setting   Patient cleared to discharge home today from cardiology standpoint   Cardiology follow up is not needed. Recommend follow up with PCP after hospitalization   Cardiology to sign off. Please contact with further questions.       Plan of care was formulated under the direction and guidance of Dr. Stoddard.         Kena Jiang PA-C  Physician Assistant   [facility]    25 minutes spent in coordination of care, and discussion with the patient and/or family regarding diagnostic results, prognosis, symptom management, risks and benefits of management options, and development of the plan of care of above.        Patient Active Problem List   Diagnosis    Alcohol dependence, continuous (H)    Alcohol dependence with uncomplicated withdrawal (H)    Alcohol abuse    SOB (shortness of breath)     NSTEMI (non-ST elevated myocardial infarction) (H)    Acute respiratory failure with hypoxia (H)       Physical Exam   Temp: 97.9  F (36.6  C) Temp src: Oral BP: (!) 162/105 Pulse: 74   Resp: 18 SpO2: 99 % O2 Device: None (Room air) Oxygen Delivery: 2 LPM  Vitals:    09/15/24 2054 09/16/24 0146 09/17/24 0500   Weight: 104.8 kg (231 lb) 102.5 kg (226 lb) 100.4 kg (221 lb 4.8 oz)     I/O last 3 completed shifts:  In: 120 [P.O.:120]  Out: -     Constitutional: Appears stated age, well nourished, NAD.  Neck: Supple. JVD not visualized.   Respiratory: Non-labored. Lungs CTAB.  Cardiovascular: RRR, normal S1 and S2. No M/G/R. Bilateral lower extremities with no edema. RFA access site without hematoma or bruit.   GI: Soft, non-distended, non-tender.  Skin: Warm and dry.   Musculoskeletal/Extremities: Symmetrical movement.  Neurologic: No gross focal deficits. Alert, awake.  Psychiatric: Affect appropriate. Mentation normal.    Medications   Current Facility-Administered Medications   Medication Dose Route Frequency Provider Last Rate Last Admin    Patient is already receiving anticoagulation with heparin, enoxaparin (LOVENOX), warfarin (COUMADIN)  or other anticoagulant medication   Does not apply Continuous PRN Nena Torres MD         Current Facility-Administered Medications   Medication Dose Route Frequency Provider Last Rate Last Admin    aspirin EC tablet 81 mg  81 mg Oral Daily Nena Torres MD   81 mg at 09/16/24 0917    disulfiram (ANTABUSE) tablet 250 mg  250 mg Oral Daily Nena Torres MD   250 mg at 09/16/24 1050    gabapentin (NEURONTIN) capsule 300 mg  300 mg Oral BID Nena Torres MD   300 mg at 09/16/24 2029    ipratropium - albuterol 0.5 mg/2.5 mg/3 mL (DUONEB) neb solution 3 mL  3 mL Nebulization 4x daily Nena Torres MD   3 mL at 09/17/24 0811    mirtazapine (REMERON) tablet 45 mg  45 mg Oral At Bedtime Nena Torres MD   45 mg at 09/17/24 4185     PARoxetine (PAXIL) tablet 40 mg  40 mg Oral QAM Genia Kruse MD   40 mg at 24    rosuvastatin (CRESTOR) tablet 10 mg  10 mg Oral Daily Genia Kruse MD   10 mg at 24    sodium chloride (PF) 0.9% PF flush 3 mL  3 mL Intracatheter Q8H Genia Kruse MD   3 mL at 24       Data   Last 24 hours labs personally reviewed.  Echo:   Recent Results (from the past 4320 hour(s))   Echocardiogram Complete   Result Value    LVEF  55-60%    Narrative    [id number]  [id number]  [id number]  [id number]^AIXA^GENIA^KRISTINE     [facility]  Echocardiography Laboratory  [address] [place] [id number]     Name: DEVAUGHN ALLAN  MRN: [id number]  : 1965  Study Date: 2024 09:27 AM  Age: 59 yrs  Gender: Female  Patient Location: [facility]  Reason For Study: MI, CHF  Ordering Physician: GENIA KRUSE  Referring Physician: Ariella Rajput  Performed By: Ezequiel Arambula     BSA: 2.1 m2  Height: 66 in  Weight: 231 lb  HR: 69  BP: 140/99 mmHg  ______________________________________________________________________________  Procedure  Complete Portable Echo Adult. Technically difficult study.  ______________________________________________________________________________  Interpretation Summary     Left ventricular systolic function is normal.  The visual ejection fraction is 55-60%.  No regional wall motion abnormalities noted.  The right ventricle is normal in size and function.  No significant valve disease.  Dilated inferior vena cava.     There is no comparison study available.  ______________________________________________________________________________  Left Ventricle  The left ventricle is normal in size. There is normal left ventricular wall  thickness. Left ventricular systolic function is normal. The visual ejection  fraction is 55-60%. Diastolic Doppler findings (E/E' ratio and/or other  parameters) suggest left ventricular  filling pressures are increased. No  regional wall motion abnormalities noted.     Right Ventricle  The right ventricle is normal in size and function.     Atria  Normal left atrial size. Right atrial size is normal. There is no atrial shunt  seen.     Mitral Valve  The mitral valve leaflets appear normal. There is no evidence of stenosis,  fluttering, or prolapse. There is trace mitral regurgitation. There is no  mitral valve stenosis.     Tricuspid Valve  Normal tricuspid valve. There is trace tricuspid regurgitation. Right  ventricular systolic pressure could not be approximated due to inadequate  tricuspid regurgitation.     Aortic Valve  The aortic valve is not well visualized. No aortic regurgitation is present.  No aortic stenosis is present.     Pulmonic Valve  There is trace pulmonic valvular regurgitation.     Vessels  The aortic root is normal size. Normal size ascending aorta. Dilation of the  inferior vena cava is present with abnormal respiratory variation in diameter.     Pericardium  There is no pericardial effusion.     Rhythm  Sinus rhythm was noted.  ______________________________________________________________________________  MMode/2D Measurements & Calculations  IVSd: 1.1 cm     LVIDd: 5.1 cm  LVIDs: 4.1 cm  LVPWd: 1.0 cm  FS: 19.6 %  LV mass(C)d: 200.8 grams  LV mass(C)dI: 94.4 grams/m2  Ao root diam: 2.9 cm  asc Aorta Diam: 3.4 cm  LVOT diam: 1.8 cm  LVOT area: 2.5 cm2  Ao root diam index Ht(cm/m): 1.7  Ao root diam index BSA (cm/m2): 1.4  Asc Ao diam index BSA (cm/m2): 1.6  Asc Ao diam index Ht(cm/m): 2.0  LA Volume (BP): 45.3 ml     LA Volume Index (BP): 21.3 ml/m2  RV Base: 2.8 cm  RWT: 0.39  TAPSE: 2.4 cm     Doppler Measurements & Calculations  MV E max alvin: 139.0 cm/sec  MV A max alvin: 101.0 cm/sec  MV E/A: 1.4  MV dec slope: 573.0 cm/sec2  MV dec time: 0.24 sec  PA acc time: 0.12 sec  E/E' av.6  Lateral E/e': 16.0  Medial E/e': 21.3  RV S Alvin: 12.5 cm/sec      ______________________________________________________________________________  Report approved by: Dr Kike Oliveira 09/16/2024 10:23 AM

## 2024-09-17 NOTE — DISCHARGE SUMMARY
"[facility]  Hospitalist Discharge Summary      Date of Admission:  9/15/2024  Date of Discharge:  9/17/2024  Discharging Provider: Oliva Ramírez DO  Discharge Service: Hospitalist Service    Discharge Diagnoses   See below    Clinically Significant Risk Factors     # Obesity: Estimated body mass index is 35.72 kg/m  as calculated from the following:    Height as of this encounter: 1.676 m (5' 6\").    Weight as of this encounter: 100.4 kg (221 lb 4.8 oz).       Follow-ups Needed After Discharge   Follow-up Appointments     Follow-up and recommended labs and tests       Follow up with primary care provider, Felipe Ram, within 7 days   for hospital follow- up.  No follow up labs or test are needed.          Discharge Disposition   Discharged to home  Condition at discharge: Stable    Hospital Course   Steph Drew is a 59 year old female admitted on 9/15/2024.  Patient is admitted for management of acute hypoxic respiratory failure, NSTEMI. She underwent angiogram without CAD. TTE returned normal with normal EF. Etiology of her presentation suspected due to uncontrolled HTN with possible flash pulmonary edema component. All resolved with minimal interventions. Recommended she stop taking clonidine for anxiety, will start losartan here. Will discharge with close PCP follow up    Acute hypoxic respiratory failure  Possible pulmonary edema on imaging  Tobacco use disorder  NSTEMI  *Presents with acute worsening of shortness of breath.  Noted to be hypoxic to the 80s by EMS.  She was given neb treatment, Solu-Medrol and route.  Hypoxic to 84% on room air in ED arrival.  Received neb treatments.  Oxygenation improved with 6 L supplemental oxygen which has since been weaned down to 4 L/min.  *D-dimer 0.57.  Lactic acid 2.7 down to 1. ProBNP 69, Trop 129  *CT chest--- negative for pulmonary embolism.  Moderate interstitial lung edema, consistent with congestive heart " failure.  *Received terbutaline, DuoNeb, Solu-Medrol 125 mg by EMS  *Received ceftriaxone, doxycycline, DuoNeb in the ED   initiated on heparin for NSTEMI and Lasix ordered.      - symptoms developed during exertion with rapid resolution in symptoms with IV lasix. No steroids given. Low suspicion for primary pulmonary component. Hold treatment for COPD. Encourage tobacco cessation     NSTEMI  *EKG shows normal sinus rhythm.  Denies any chest pain.  Presented with acute worsening of shortness of breath as above  *Initial troponin 129. Trop 69. CT chest as above concerning for pulm edema. ? If dypnea anginal equivalent   *Coronary angiogram in 9/2021 through [facility]-- no significant coronary artery disease  *cMRI (9/2021)--showed myocarditis  *Has received aspirin and initiated on heparin drip in the emergency room.  *Coronary angiogram 9/16/2024- no significant CAD  *Echo 9/16/2024- LVEF 55-60% with no WMA  - recommend better BP control. Stop clonidine and start losartan    Subclinical hypothyroidism  *TSH 5.92, free T41.09  *This is likely secondary to acute illness.  Will need TSH check in 6 to 8 weeks as an outpatient     Alcohol use disorder in remission  *Reports she has been sober for 1 year.  Congratulated patient.  -Continue with PTA Antabuse     Generalized anxiety disorder  Major depressive disorder  -Continue with PTA Paroxetine, Remeron and gabapentin  - hold Clonidine tonight since bp in the low normal range and will be receiving Lasix          Consultations This Hospital Stay   PHARMACY IP CONSULT  CARDIOLOGY IP CONSULT  PHARMACY IP CONSULT  PHARMACY IP CONSULT  SMOKING CESSATION PROGRAM IP CONSULT    Code Status   Full Code    Time Spent on this Encounter   I, Oliva Ramírez DO, personally saw the patient today and spent greater than 30 minutes discharging this patient.       Oliva Ramírez DO  [facility]  [address], [address]  [facility] [address]  Phone:  [phone]  ______________________________________________________________________    Physical Exam   Vital Signs: Temp: 97.9  F (36.6  C) Temp src: Oral BP: (!) 147/91 Pulse: 91   Resp: 18 SpO2: 99 % O2 Device: None (Room air) Oxygen Delivery: 2 LPM  Weight: 221 lbs 4.8 oz         Primary Care Physician   Felipe Ram    Discharge Orders      Brief Discharge Instructions    Do NOT stop your aspirin or platelet inhibitor unless directed by your Cardiologist.  These medications help to prevent platelets in your blood from sticking together and forming a clot.  Examples of these medications are:  Ticagrelor (Brilinta), Clopidigrel (Plavix), Prasugrel (Effient)     When to call - Contact the Heart Clinic    You may experience symptoms that require follow-up before your scheduled appointment. Contact the Heart Clinic if you develop: Fever over 100.4o Fahrenheit, that lasts more than one day; Redness, heat, or pus at the puncture site; Change in color or temperature in your groin or leg.     When to call - Reasons to Call 911    If your groin starts to bleed or begins to swell suddenly after leaving the hospital, lie flat and apply firm pressure just above the puncture site for 15 minutes.  If bleeding continues, call 9-1-1.     Precautions - Lifting    NO lifting of more than 10 pounds for at least 3 days.  If you usually lift 50 pounds or more daily, talk with your Cardiologist.     Precautions - Household Activities    Avoid any hard work or tiring activities.  NO physical activity such as mowing the lawn, raking, vacuuming, changing sheets on your bed, snow shoveling, or using a .     Precautions - Active Sports Activities    Avoid any tiring sports activities.  This includes, yard work, jogging, biking, bowling, swimming, tennis or golf, and sexual activity.     Precautions - Elective Dental Work    NO elective dental work for 6 weeks after receiving a stent.     Comfort and Pain Management -  Pain after Surgery    Pain after surgery is normal and expected.  Your leg may be sore or stiff for a few days, and your pain will improve with time. You may take Tylenol or a pain medicine recommended by your Cardiologist.     Comfort and Pain Management - Bruising after Surgery    Bruising around the groin area is normal.  It may take 2-3 weeks for this to go away.  It is normal for the bruised area to turn green and/or yellow as it is healing.  A small lump may also be present and may last 2-3 months.     Activity - Daily Walking    During the day get up and walk around every 2 hours.     Activity - Light Household Activities    Light household activities are ok.     Activity - Elevate Legs    Elevate legs in between all activities.     Activity - Cardiac Rehab    You are encouraged to enroll in an Outpatient Cardiac Rehab program after discharge from the hospital.  Our Cardiac Rehab staff may visit briefly with you while you're in the hospital.  If they miss you, someone will contact you after you are home.     Return to Driving    Driving is NOT permitted for 24 hours after surgery     Return to work    You may return to work after 72 hours if you are feeling well and your job does not involve heavy lifting.     Dressing Removal    You may take off the dressing on your groin the day after your procedure.     Incision Care    Keep the incision area dry and clean.  You do not need to use a bandage on your incision.     Shower / Bathing    It is ok to shower with regular soap. Pat dry, do not rub. No tub bath for 3 days. No swimming in a pool or hot tub immersion for 1 week     Reason for your hospital stay    You presented for SOB and likely flash pulmonary edema due to elevated BP. You have a procedure on your heart that was normal. You can follow up with your PCP next you don't need a cardiologist. You need to stop smoking     Follow-up and recommended labs and tests     Follow up with primary care provider,  Felipe Ram, within 7 days for hospital follow- up.  No follow up labs or test are needed.     Activity    Your activity upon discharge: activity as tolerated     Diet    Follow this diet upon discharge: Current Diet:Orders Placed This Encounter      Low Saturated Fat Na <2400 mg       Significant Results and Procedures   Results for orders placed or performed during the hospital encounter of 09/15/24   CT Chest Pulmonary Embolism w Contrast    Narrative    EXAM: CT CHEST PULMONARY EMBOLISM W CONTRAST  LOCATION: [facility]  DATE: 9/15/2024    INDICATION: SOB, elevated D dimer, cocnern for PE vs PNA vs COPD exacerbation vs NSTEMI  COMPARISON: None.  TECHNIQUE: CT chest pulmonary angiogram during arterial phase injection of IV contrast. Multiplanar reformats and MIP reconstructions were performed. Dose reduction techniques were used.   CONTRAST: 75mL Isovue 370    FINDINGS:    ANGIOGRAM CHEST: Pulmonary arteries are normal caliber and negative for pulmonary emboli. Normal caliber aortic root with preserved sinotubular junction. Normal caliber thoracic aorta with 2 vessel arch anatomy. No acute aortic syndrome.    LUNGS AND PLEURA: There is smooth interlobular septal thickening symmetrically in the lung bases and apices along with peribronchial thickening consistent with moderate interstitial lung edema. No pleural effusions. No focal airspace opacities. Trachea   and central airways are patent.    MEDIASTINUM: Normal.    CORONARY ARTERY CALCIFICATION: Mild.    UPPER ABDOMEN: No actionable findings in the imaged upper abdomen.    MUSCULOSKELETAL: There are small mid and lower thoracic spine degenerative osteophytes minimal anterior wedging of T7. No aggressive or destructive bone lesions.      Impression    IMPRESSION:    1.  No acute pulmonary embolism or aortopathy.  2.  Moderate interstitial lung edema, consistent with acute congestive heart failure.    Echocardiogram Complete      Value    LVEF  55-60%    [facility]    [id number]  [id number]  [id number]  [id number]^AIXA^[email or web address]^KRISTINE     [facility]  Echocardiography Laboratory  [address] [place] [address]     Name: DEVAUGHN ALLAN  MRN: [id number]  : 1965  Study Date: 2024 09:27 AM  Age: 59 yrs  Gender: Female  Patient Location: [facility]  Reason For Study: MI, CHF  Ordering Physician: GENIA KRUSE  Referring Physician: Ariella Rajput  Performed By: Ezequiel Arambula     BSA: 2.1 m2  Height: 66 in  Weight: 231 lb  HR: 69  BP: 140/99 mmHg  ______________________________________________________________________________  Procedure  Complete Portable Echo Adult. Technically difficult study.  ______________________________________________________________________________  Interpretation Summary     Left ventricular systolic function is normal.  The visual ejection fraction is 55-60%.  No regional wall motion abnormalities noted.  The right ventricle is normal in size and function.  No significant valve disease.  Dilated inferior vena cava.     There is no comparison study available.  ______________________________________________________________________________  Left Ventricle  The left ventricle is normal in size. There is normal left ventricular wall  thickness. Left ventricular systolic function is normal. The visual ejection  fraction is 55-60%. Diastolic Doppler findings (E/E' ratio and/or other  parameters) suggest left ventricular filling pressures are increased. No  regional wall motion abnormalities noted.     Right Ventricle  The right ventricle is normal in size and function.     Atria  Normal left atrial size. Right atrial size is normal. There is no atrial shunt  seen.     Mitral Valve  The mitral valve leaflets appear normal. There is no evidence of stenosis,  fluttering, or prolapse. There is trace mitral regurgitation. There is no  mitral valve stenosis.     Tricuspid  Valve  Normal tricuspid valve. There is trace tricuspid regurgitation. Right  ventricular systolic pressure could not be approximated due to inadequate  tricuspid regurgitation.     Aortic Valve  The aortic valve is not well visualized. No aortic regurgitation is present.  No aortic stenosis is present.     Pulmonic Valve  There is trace pulmonic valvular regurgitation.     Vessels  The aortic root is normal size. Normal size ascending aorta. Dilation of the  inferior vena cava is present with abnormal respiratory variation in diameter.     Pericardium  There is no pericardial effusion.     Rhythm  Sinus rhythm was noted.  ______________________________________________________________________________  MMode/2D Measurements & Calculations  IVSd: 1.1 cm     LVIDd: 5.1 cm  LVIDs: 4.1 cm  LVPWd: 1.0 cm  FS: 19.6 %  LV mass(C)d: 200.8 grams  LV mass(C)dI: 94.4 grams/m2  Ao root diam: 2.9 cm  asc Aorta Diam: 3.4 cm  LVOT diam: 1.8 cm  LVOT area: 2.5 cm2  Ao root diam index Ht(cm/m): 1.7  Ao root diam index BSA (cm/m2): 1.4  Asc Ao diam index BSA (cm/m2): 1.6  Asc Ao diam index Ht(cm/m): 2.0  LA Volume (BP): 45.3 ml     LA Volume Index (BP): 21.3 ml/m2  RV Base: 2.8 cm  RWT: 0.39  TAPSE: 2.4 cm     Doppler Measurements & Calculations  MV E max alvin: 139.0 cm/sec  MV A max alvin: 101.0 cm/sec  MV E/A: 1.4  MV dec slope: 573.0 cm/sec2  MV dec time: 0.24 sec  PA acc time: 0.12 sec  E/E' av.6  Lateral E/e': 16.0  Medial E/e': 21.3  RV S Alvin: 12.5 cm/sec     ______________________________________________________________________________  Report approved by: Dr Kike Oliveira 2024 10:23 AM         Cardiac Catheterization    Narrative    - There is no significant CAD         Discharge Medications   Current Discharge Medication List        START taking these medications    Details   aspirin 81 MG EC tablet Take 1 tablet (81 mg) by mouth daily.  Qty: 30 tablet, Refills: 0    Associated Diagnoses: NSTEMI (non-ST elevated  myocardial infarction) (H)      losartan (COZAAR) 25 MG tablet Take 1 tablet (25 mg) by mouth daily.  Qty: 30 tablet, Refills: 0    Associated Diagnoses: NSTEMI (non-ST elevated myocardial infarction) (H)           CONTINUE these medications which have NOT CHANGED    Details   disulfiram (ANTABUSE) 250 MG tablet Take 1 tablet (250 mg) by mouth daily  Qty: 30 tablet, Refills: 0    Associated Diagnoses: Alcohol dependence with uncomplicated withdrawal (H)      !! gabapentin (NEURONTIN) 300 MG capsule Take 300 mg by mouth daily as needed for other (anxiety). In addition to 300mg twice daily      !! gabapentin (NEURONTIN) 300 MG capsule Take 1 capsule (300 mg) by mouth 3 times daily  Qty: 90 capsule, Refills: 0    Associated Diagnoses: Alcohol dependence with uncomplicated withdrawal (H)      mirtazapine (REMERON) 45 MG tablet Take 1 tablet (45 mg) by mouth At Bedtime  Qty: 90 tablet, Refills: 0    Associated Diagnoses: Alcohol dependence with uncomplicated withdrawal (H)      multivitamin w/minerals (THERA-VIT-M) tablet Take 1 tablet by mouth daily  Qty: 30 tablet, Refills: 0    Associated Diagnoses: Alcohol dependence with uncomplicated withdrawal (H)      PARoxetine (PAXIL) 20 MG tablet Take 40 mg by mouth every morning.      rosuvastatin (CRESTOR) 10 MG tablet Take 10 mg by mouth daily.       !! - Potential duplicate medications found. Please discuss with provider.        STOP taking these medications       cloNIDine (CATAPRES) 0.1 MG tablet Comments:   Reason for Stopping:         cloNIDine (CATAPRES) 0.1 MG tablet Comments:   Reason for Stopping:             Allergies   No Known Allergies

## 2024-09-17 NOTE — PROGRESS NOTES
A&O x4. VSS on room air. Tele SR. Denies CP/SOB/pain. Up independently. R groin site WDL. Started on losartan. Pt discharged home with her brother.

## 2024-09-19 ENCOUNTER — PATIENT OUTREACH (OUTPATIENT)
Dept: CARE COORDINATION | Facility: CLINIC | Age: 59
End: 2024-09-19
Payer: MEDICARE

## 2024-09-19 NOTE — PROGRESS NOTES
Connected Care Resource Center Contact  [facility]/Voicemail     Clinical Data: Post-Discharge Outreach     Outreach attempted x 2.  Left message on patient's voicemail, providing [facility]'s central phone number of [phone] ([phone]) for questions/concerns and/or to schedule an appt with an [facility] provider, if they do not have a PCP.      Plan:  [facility] will do no further outreaches at this time.       ROXANA Duffy  Connected Care Resource Center, [facility]    *Connected Care Resource Team does NOT follow patient ongoing. Referrals are identified based on internal discharge reports and the outreach is to ensure patient has an understanding of their discharge instructions.

## 2024-09-20 ENCOUNTER — TELEPHONE (OUTPATIENT)
Dept: CARDIOLOGY | Facility: CLINIC | Age: 59
End: 2024-09-20
Payer: MEDICARE

## 2024-09-20 NOTE — TELEPHONE ENCOUNTER
Patient was admitted to [facility] on 9/15/24 with SOB and elevated troponin's.    PMH: HTN, current tobacco and marijuana dependence syndrome, anxiety, peripheral neuropathy, prior history of alcohol dependence but sober for a year.     9/16/24: Echo showed EF of 55-60% with no WMA.    9/16/24: Coronary angiogram via RFA showed no significant CAD.    Pt was started on ASA and Losartan. PTA Clonidine was discontinued.    No cardiology follow up OV needed.    Writer attempted to call pt for a cardiology post discharge phone call, but no answer and VM box is full. Will try back at a later date. ROBERT Driver RN.

## 2024-09-25 NOTE — TELEPHONE ENCOUNTER
Second attempt at calling pt.    Called patient to discuss any post hospital d/c questions she may have, review medication changes, and confirm f/u appts. Patient denied any questions regarding new medications or changes with their PTA medications.    Patient denied any SOB, chest pain or lightheadedness.     RFA cardiac cath site is without bleeding, swelling, redness or signs of infection.     States she followed up with PMD already.    No further questions. ROBERT Driver RN.
